# Patient Record
Sex: FEMALE | Race: WHITE | HISPANIC OR LATINO | Employment: OTHER | URBAN - METROPOLITAN AREA
[De-identification: names, ages, dates, MRNs, and addresses within clinical notes are randomized per-mention and may not be internally consistent; named-entity substitution may affect disease eponyms.]

---

## 2024-06-14 ENCOUNTER — PRE-ADMISSION TESTING (OUTPATIENT)
Dept: PREADMISSION TESTING | Facility: HOSPITAL | Age: 69
End: 2024-06-14
Payer: MEDICARE

## 2024-06-14 VITALS — BODY MASS INDEX: 26.06 KG/M2 | HEIGHT: 61 IN | WEIGHT: 138 LBS

## 2024-06-14 LAB
DEPRECATED RDW RBC AUTO: 45.1 FL (ref 37–54)
ERYTHROCYTE [DISTWIDTH] IN BLOOD BY AUTOMATED COUNT: 13.4 % (ref 12.3–15.4)
HCT VFR BLD AUTO: 45.1 % (ref 34–46.6)
HGB BLD-MCNC: 15 G/DL (ref 12–15.9)
MCH RBC QN AUTO: 30.2 PG (ref 26.6–33)
MCHC RBC AUTO-ENTMCNC: 33.3 G/DL (ref 31.5–35.7)
MCV RBC AUTO: 90.9 FL (ref 79–97)
PLATELET # BLD AUTO: 266 10*3/MM3 (ref 140–450)
PMV BLD AUTO: 10.9 FL (ref 6–12)
POTASSIUM SERPL-SCNC: 3.7 MMOL/L (ref 3.5–5.2)
RBC # BLD AUTO: 4.96 10*6/MM3 (ref 3.77–5.28)
WBC NRBC COR # BLD AUTO: 9.04 10*3/MM3 (ref 3.4–10.8)

## 2024-06-14 PROCEDURE — 93005 ELECTROCARDIOGRAM TRACING: CPT

## 2024-06-14 PROCEDURE — 85027 COMPLETE CBC AUTOMATED: CPT

## 2024-06-14 PROCEDURE — 36415 COLL VENOUS BLD VENIPUNCTURE: CPT

## 2024-06-14 PROCEDURE — 84132 ASSAY OF SERUM POTASSIUM: CPT

## 2024-06-14 RX ORDER — MIRABEGRON 50 MG/1
50 TABLET, EXTENDED RELEASE ORAL DAILY
COMMUNITY

## 2024-06-14 RX ORDER — ALPRAZOLAM 0.5 MG/1
TABLET ORAL
COMMUNITY
Start: 2024-06-06

## 2024-06-14 RX ORDER — VIBEGRON 75 MG/1
75 TABLET, FILM COATED ORAL DAILY
COMMUNITY

## 2024-06-14 RX ORDER — CEPHALEXIN 500 MG/1
CAPSULE ORAL
COMMUNITY
Start: 2024-05-29

## 2024-06-14 RX ORDER — GABAPENTIN 600 MG/1
600 TABLET ORAL 3 TIMES DAILY
COMMUNITY

## 2024-06-14 RX ORDER — ROPINIROLE 1 MG/1
1 TABLET, FILM COATED ORAL 3 TIMES DAILY
COMMUNITY

## 2024-06-14 RX ORDER — OMEPRAZOLE 20 MG/1
20 CAPSULE, DELAYED RELEASE ORAL DAILY
COMMUNITY

## 2024-06-14 RX ORDER — ALBUTEROL SULFATE 90 UG/1
AEROSOL, METERED RESPIRATORY (INHALATION)
COMMUNITY
Start: 2024-05-29

## 2024-06-14 RX ORDER — GLYBURIDE 5 MG/1
5 TABLET ORAL
COMMUNITY

## 2024-06-14 RX ORDER — PROMETHAZINE HYDROCHLORIDE 25 MG/1
25 TABLET ORAL EVERY 6 HOURS PRN
COMMUNITY

## 2024-06-14 RX ORDER — ERYTHROMYCIN 5 MG/G
OINTMENT OPHTHALMIC 4 TIMES DAILY
COMMUNITY

## 2024-06-14 RX ORDER — CLOTRIMAZOLE AND BETAMETHASONE DIPROPIONATE 10; .64 MG/G; MG/G
CREAM TOPICAL
COMMUNITY

## 2024-06-14 RX ORDER — BACLOFEN 10 MG/1
10 TABLET ORAL 3 TIMES DAILY
COMMUNITY

## 2024-06-14 RX ORDER — METHADONE HYDROCHLORIDE 10 MG/1
10 TABLET ORAL EVERY 8 HOURS PRN
COMMUNITY
End: 2024-06-21 | Stop reason: HOSPADM

## 2024-06-14 RX ORDER — AMLODIPINE BESYLATE 5 MG/1
5 TABLET ORAL DAILY
COMMUNITY

## 2024-06-14 RX ORDER — BUSPIRONE HYDROCHLORIDE 10 MG/1
3 TABLET ORAL 2 TIMES DAILY
COMMUNITY
Start: 2024-05-23

## 2024-06-14 RX ORDER — DIPHENOXYLATE HYDROCHLORIDE AND ATROPINE SULFATE 2.5; .025 MG/1; MG/1
2 TABLET ORAL 4 TIMES DAILY
COMMUNITY

## 2024-06-14 RX ORDER — OXYCODONE HCL 10 MG/1
10 TABLET, FILM COATED, EXTENDED RELEASE ORAL AS NEEDED
COMMUNITY
End: 2024-06-21 | Stop reason: HOSPADM

## 2024-06-14 RX ORDER — LOSARTAN POTASSIUM 100 MG/1
100 TABLET ORAL DAILY
COMMUNITY

## 2024-06-14 RX ORDER — ATENOLOL 25 MG/1
25 TABLET ORAL DAILY
COMMUNITY

## 2024-06-14 RX ORDER — ALLOPURINOL 100 MG/1
100 TABLET ORAL 2 TIMES DAILY
COMMUNITY

## 2024-06-14 NOTE — PAT
Pt >30 minutes late to PAT appointment. Abbreviated visit completed. Please review medical history and medication list in pre-op.     An arrival time for procedure was not provided during PAT visit. If patient had any questions or concerns about their arrival time, they were instructed to contact their surgeon/physician.  Additionally, if the patient referred to an arrival time that was acquired from their my chart account, patient was encouraged to verify that time with their surgeon/physician. Arrival times are NOT provided in Pre Admission Testing Department.    Patient to apply Chlorhexadine wipes  to surgical area (as instructed) the night before procedure and the AM of procedure. Wipes provided.    Per Anesthesia Request, patient instructed not to take their ACE/ARB medications on the AM of surgery.    Post-Surgery Information Instruction Sheet given to patient during Pre-Admission Testing Visit with verbal instructions to patient to return with PAT PASS on the day of surgery. Additionally, encouraged patient to review the information provided.    EKG from PAT today faxed to anesthesiology department for review and cardiac clearance. RN spoke with Dr. Lauren and reviewed pertinent medical history and EKG results.  Per Dr. Lauren, patient is cleared to proceed with procedure as planned without additional cardiac testing. Patient denies chest pain or increased shortness of breath.

## 2024-06-17 LAB
QT INTERVAL: 374 MS
QTC INTERVAL: 436 MS

## 2024-06-20 ENCOUNTER — ANESTHESIA EVENT (OUTPATIENT)
Dept: PERIOP | Facility: HOSPITAL | Age: 69
End: 2024-06-20
Payer: MEDICARE

## 2024-06-20 RX ORDER — FAMOTIDINE 10 MG/ML
20 INJECTION, SOLUTION INTRAVENOUS ONCE
Status: CANCELLED | OUTPATIENT
Start: 2024-06-20 | End: 2024-06-20

## 2024-06-21 ENCOUNTER — APPOINTMENT (OUTPATIENT)
Dept: GENERAL RADIOLOGY | Facility: HOSPITAL | Age: 69
End: 2024-06-21
Payer: MEDICARE

## 2024-06-21 ENCOUNTER — ANESTHESIA (OUTPATIENT)
Dept: PERIOP | Facility: HOSPITAL | Age: 69
End: 2024-06-21
Payer: MEDICARE

## 2024-06-21 ENCOUNTER — HOSPITAL ENCOUNTER (OUTPATIENT)
Facility: HOSPITAL | Age: 69
Setting detail: HOSPITAL OUTPATIENT SURGERY
Discharge: HOME | End: 2024-06-21
Attending: PAIN MEDICINE | Admitting: PAIN MEDICINE
Payer: MEDICARE

## 2024-06-21 VITALS
TEMPERATURE: 97.6 F | BODY MASS INDEX: 26.06 KG/M2 | OXYGEN SATURATION: 92 % | SYSTOLIC BLOOD PRESSURE: 113 MMHG | HEART RATE: 84 BPM | HEIGHT: 61 IN | RESPIRATION RATE: 12 BRPM | DIASTOLIC BLOOD PRESSURE: 60 MMHG | WEIGHT: 138 LBS

## 2024-06-21 LAB — GLUCOSE BLDC GLUCOMTR-MCNC: 92 MG/DL (ref 70–130)

## 2024-06-21 PROCEDURE — 25010000002 PROPOFOL 10 MG/ML EMULSION: Performed by: NURSE ANESTHETIST, CERTIFIED REGISTERED

## 2024-06-21 PROCEDURE — 25010000002 MIDAZOLAM PER 1 MG: Performed by: STUDENT IN AN ORGANIZED HEALTH CARE EDUCATION/TRAINING PROGRAM

## 2024-06-21 PROCEDURE — 25010000002 CEFAZOLIN PER 500 MG: Performed by: PAIN MEDICINE

## 2024-06-21 PROCEDURE — 25010000002 DEXAMETHASONE PER 1 MG: Performed by: NURSE ANESTHETIST, CERTIFIED REGISTERED

## 2024-06-21 PROCEDURE — 82948 REAGENT STRIP/BLOOD GLUCOSE: CPT

## 2024-06-21 PROCEDURE — 25810000003 SODIUM CHLORIDE PER 500 ML: Performed by: PAIN MEDICINE

## 2024-06-21 PROCEDURE — 25010000002 FENTANYL CITRATE (PF) 100 MCG/2ML SOLUTION: Performed by: NURSE ANESTHETIST, CERTIFIED REGISTERED

## 2024-06-21 PROCEDURE — 25010000002 LIDOCAINE 1 % SOLUTION: Performed by: PAIN MEDICINE

## 2024-06-21 PROCEDURE — C1755 CATHETER, INTRASPINAL: HCPCS | Performed by: PAIN MEDICINE

## 2024-06-21 PROCEDURE — 25010000002 SUGAMMADEX 200 MG/2ML SOLUTION

## 2024-06-21 PROCEDURE — C1772 INFUSION PUMP, PROGRAMMABLE: HCPCS | Performed by: PAIN MEDICINE

## 2024-06-21 PROCEDURE — 76000 FLUOROSCOPY <1 HR PHYS/QHP: CPT

## 2024-06-21 PROCEDURE — 25810000003 LACTATED RINGERS PER 1000 ML: Performed by: NURSE ANESTHETIST, CERTIFIED REGISTERED

## 2024-06-21 PROCEDURE — C1787 PATIENT PROGR, NEUROSTIM: HCPCS | Performed by: PAIN MEDICINE

## 2024-06-21 PROCEDURE — 25010000002 ONDANSETRON PER 1 MG

## 2024-06-21 DEVICE — CATH INTRATHCL ASCENDA SHRT 1PC16G114.3: Type: IMPLANTABLE DEVICE | Site: BACK | Status: FUNCTIONAL

## 2024-06-21 DEVICE — PUMP INFUS INTRATHCL SYNCHROMED3 PROG 20ML: Type: IMPLANTABLE DEVICE | Site: BACK | Status: FUNCTIONAL

## 2024-06-21 RX ORDER — DEXAMETHASONE SODIUM PHOSPHATE 4 MG/ML
INJECTION, SOLUTION INTRA-ARTICULAR; INTRALESIONAL; INTRAMUSCULAR; INTRAVENOUS; SOFT TISSUE AS NEEDED
Status: DISCONTINUED | OUTPATIENT
Start: 2024-06-21 | End: 2024-06-21 | Stop reason: SURG

## 2024-06-21 RX ORDER — MIDAZOLAM HYDROCHLORIDE 1 MG/ML
2 INJECTION INTRAMUSCULAR; INTRAVENOUS ONCE
Status: COMPLETED | OUTPATIENT
Start: 2024-06-21 | End: 2024-06-21

## 2024-06-21 RX ORDER — DROPERIDOL 2.5 MG/ML
0.62 INJECTION, SOLUTION INTRAMUSCULAR; INTRAVENOUS ONCE AS NEEDED
Status: DISCONTINUED | OUTPATIENT
Start: 2024-06-21 | End: 2024-06-21 | Stop reason: HOSPADM

## 2024-06-21 RX ORDER — SODIUM CHLORIDE 9 MG/ML
INJECTION, SOLUTION INTRAVENOUS AS NEEDED
Status: DISCONTINUED | OUTPATIENT
Start: 2024-06-21 | End: 2024-06-21 | Stop reason: HOSPADM

## 2024-06-21 RX ORDER — LIDOCAINE HYDROCHLORIDE 10 MG/ML
INJECTION, SOLUTION INFILTRATION; PERINEURAL AS NEEDED
Status: DISCONTINUED | OUTPATIENT
Start: 2024-06-21 | End: 2024-06-21 | Stop reason: HOSPADM

## 2024-06-21 RX ORDER — SODIUM CHLORIDE, SODIUM LACTATE, POTASSIUM CHLORIDE, CALCIUM CHLORIDE 600; 310; 30; 20 MG/100ML; MG/100ML; MG/100ML; MG/100ML
9 INJECTION, SOLUTION INTRAVENOUS CONTINUOUS
Status: DISCONTINUED | OUTPATIENT
Start: 2024-06-21 | End: 2024-06-21 | Stop reason: HOSPADM

## 2024-06-21 RX ORDER — ROCURONIUM BROMIDE 10 MG/ML
INJECTION, SOLUTION INTRAVENOUS AS NEEDED
Status: DISCONTINUED | OUTPATIENT
Start: 2024-06-21 | End: 2024-06-21 | Stop reason: SURG

## 2024-06-21 RX ORDER — SODIUM CHLORIDE 0.9 % (FLUSH) 0.9 %
10 SYRINGE (ML) INJECTION AS NEEDED
Status: DISCONTINUED | OUTPATIENT
Start: 2024-06-21 | End: 2024-06-21 | Stop reason: HOSPADM

## 2024-06-21 RX ORDER — MIDAZOLAM HYDROCHLORIDE 1 MG/ML
0.5 INJECTION INTRAMUSCULAR; INTRAVENOUS
Status: DISCONTINUED | OUTPATIENT
Start: 2024-06-21 | End: 2024-06-21 | Stop reason: HOSPADM

## 2024-06-21 RX ORDER — FENTANYL CITRATE 50 UG/ML
50 INJECTION, SOLUTION INTRAMUSCULAR; INTRAVENOUS
Status: DISCONTINUED | OUTPATIENT
Start: 2024-06-21 | End: 2024-06-21 | Stop reason: HOSPADM

## 2024-06-21 RX ORDER — PROPOFOL 10 MG/ML
VIAL (ML) INTRAVENOUS AS NEEDED
Status: DISCONTINUED | OUTPATIENT
Start: 2024-06-21 | End: 2024-06-21 | Stop reason: SURG

## 2024-06-21 RX ORDER — BUPIVACAINE HYDROCHLORIDE AND EPINEPHRINE 5; 5 MG/ML; UG/ML
INJECTION, SOLUTION EPIDURAL; INTRACAUDAL; PERINEURAL AS NEEDED
Status: DISCONTINUED | OUTPATIENT
Start: 2024-06-21 | End: 2024-06-21 | Stop reason: HOSPADM

## 2024-06-21 RX ORDER — LIDOCAINE HYDROCHLORIDE 10 MG/ML
0.5 INJECTION, SOLUTION EPIDURAL; INFILTRATION; INTRACAUDAL; PERINEURAL ONCE AS NEEDED
Status: DISCONTINUED | OUTPATIENT
Start: 2024-06-21 | End: 2024-06-21 | Stop reason: HOSPADM

## 2024-06-21 RX ORDER — SODIUM CHLORIDE 0.9 % (FLUSH) 0.9 %
10 SYRINGE (ML) INJECTION EVERY 12 HOURS SCHEDULED
Status: DISCONTINUED | OUTPATIENT
Start: 2024-06-21 | End: 2024-06-21 | Stop reason: HOSPADM

## 2024-06-21 RX ORDER — LIDOCAINE HYDROCHLORIDE 10 MG/ML
INJECTION, SOLUTION EPIDURAL; INFILTRATION; INTRACAUDAL; PERINEURAL AS NEEDED
Status: DISCONTINUED | OUTPATIENT
Start: 2024-06-21 | End: 2024-06-21 | Stop reason: SURG

## 2024-06-21 RX ORDER — SODIUM CHLORIDE, SODIUM LACTATE, POTASSIUM CHLORIDE, CALCIUM CHLORIDE 600; 310; 30; 20 MG/100ML; MG/100ML; MG/100ML; MG/100ML
INJECTION, SOLUTION INTRAVENOUS CONTINUOUS PRN
Status: DISCONTINUED | OUTPATIENT
Start: 2024-06-21 | End: 2024-06-21 | Stop reason: SURG

## 2024-06-21 RX ORDER — SODIUM CHLORIDE 9 MG/ML
40 INJECTION, SOLUTION INTRAVENOUS AS NEEDED
Status: DISCONTINUED | OUTPATIENT
Start: 2024-06-21 | End: 2024-06-21 | Stop reason: HOSPADM

## 2024-06-21 RX ORDER — ONDANSETRON 2 MG/ML
INJECTION INTRAMUSCULAR; INTRAVENOUS AS NEEDED
Status: DISCONTINUED | OUTPATIENT
Start: 2024-06-21 | End: 2024-06-21 | Stop reason: SURG

## 2024-06-21 RX ORDER — FAMOTIDINE 20 MG/1
20 TABLET, FILM COATED ORAL ONCE
Status: DISCONTINUED | OUTPATIENT
Start: 2024-06-21 | End: 2024-06-21 | Stop reason: HOSPADM

## 2024-06-21 RX ORDER — EPHEDRINE SULFATE 50 MG/ML
INJECTION INTRAVENOUS AS NEEDED
Status: DISCONTINUED | OUTPATIENT
Start: 2024-06-21 | End: 2024-06-21 | Stop reason: SURG

## 2024-06-21 RX ORDER — FENTANYL CITRATE 50 UG/ML
INJECTION, SOLUTION INTRAMUSCULAR; INTRAVENOUS AS NEEDED
Status: DISCONTINUED | OUTPATIENT
Start: 2024-06-21 | End: 2024-06-21 | Stop reason: SURG

## 2024-06-21 RX ADMIN — SODIUM CHLORIDE 2000 MG: 900 INJECTION INTRAVENOUS at 13:44

## 2024-06-21 RX ADMIN — SODIUM CHLORIDE, POTASSIUM CHLORIDE, SODIUM LACTATE AND CALCIUM CHLORIDE: 600; 310; 30; 20 INJECTION, SOLUTION INTRAVENOUS at 13:36

## 2024-06-21 RX ADMIN — PROPOFOL 20 MG: 10 INJECTION, EMULSION INTRAVENOUS at 14:55

## 2024-06-21 RX ADMIN — ONDANSETRON 4 MG: 2 INJECTION INTRAMUSCULAR; INTRAVENOUS at 14:51

## 2024-06-21 RX ADMIN — DEXAMETHASONE SODIUM PHOSPHATE 4 MG: 4 INJECTION INTRA-ARTICULAR; INTRALESIONAL; INTRAMUSCULAR; INTRAVENOUS; SOFT TISSUE at 13:40

## 2024-06-21 RX ADMIN — EPHEDRINE SULFATE 10 MG: 50 INJECTION INTRAVENOUS at 14:21

## 2024-06-21 RX ADMIN — FENTANYL CITRATE 100 MCG: 50 INJECTION, SOLUTION INTRAMUSCULAR; INTRAVENOUS at 13:36

## 2024-06-21 RX ADMIN — EPHEDRINE SULFATE 10 MG: 50 INJECTION INTRAVENOUS at 14:40

## 2024-06-21 RX ADMIN — SUGAMMADEX 200 MG: 100 INJECTION, SOLUTION INTRAVENOUS at 15:15

## 2024-06-21 RX ADMIN — PROPOFOL 20 MG: 10 INJECTION, EMULSION INTRAVENOUS at 14:59

## 2024-06-21 RX ADMIN — ROCURONIUM BROMIDE 30 MG: 10 INJECTION INTRAVENOUS at 13:36

## 2024-06-21 RX ADMIN — PROPOFOL 30 MG: 10 INJECTION, EMULSION INTRAVENOUS at 14:51

## 2024-06-21 RX ADMIN — EPHEDRINE SULFATE 10 MG: 50 INJECTION INTRAVENOUS at 13:59

## 2024-06-21 RX ADMIN — PROPOFOL 200 MG: 10 INJECTION, EMULSION INTRAVENOUS at 13:36

## 2024-06-21 RX ADMIN — LIDOCAINE HYDROCHLORIDE 50 MG: 10 INJECTION, SOLUTION EPIDURAL; INFILTRATION; INTRACAUDAL; PERINEURAL at 13:36

## 2024-06-21 RX ADMIN — SODIUM CHLORIDE, POTASSIUM CHLORIDE, SODIUM LACTATE AND CALCIUM CHLORIDE: 600; 310; 30; 20 INJECTION, SOLUTION INTRAVENOUS at 15:14

## 2024-06-21 RX ADMIN — ROCURONIUM BROMIDE 20 MG: 10 INJECTION INTRAVENOUS at 14:00

## 2024-06-21 RX ADMIN — MIDAZOLAM HYDROCHLORIDE 2 MG: 1 INJECTION, SOLUTION INTRAMUSCULAR; INTRAVENOUS at 12:39

## 2024-06-21 NOTE — ANESTHESIA PROCEDURE NOTES
Airway  Urgency: elective    Date/Time: 6/21/2024 1:38 PM  Airway not difficult    General Information and Staff    Patient location during procedure: OR  CRNA/CAA: Konrad Zimmeramn CRNA    Indications and Patient Condition  Indications for airway management: airway protection    Preoxygenated: yes  MILS not maintained throughout  Mask difficulty assessment: 1 - vent by mask    Final Airway Details  Final airway type: endotracheal airway      Successful airway: ETT  Cuffed: yes   Successful intubation technique: direct laryngoscopy  Endotracheal tube insertion site: oral  Blade: Sunitha  Blade size: 3  ETT size (mm): 7.5  Cormack-Lehane Classification: grade I - full view of glottis  Placement verified by: chest auscultation and capnometry   Measured from: lips  ETT/EBT  to lips (cm): 20  Number of attempts at approach: 1  Assessment: lips, teeth, and gum same as pre-op and atraumatic intubation    Additional Comments  Negative epigastric sounds, Breath sound equal bilaterally with symmetric chest rise and fall

## 2024-06-21 NOTE — INTERVAL H&P NOTE
Pre-Op H&P (See Recent Office Note Attached for Full H&P)    History and physical note from office reviewed and updated with the following, otherwise there are no changes in H&P:      Review of Systems:  General ROS:  no fever, chills, rashes.  No change since last office visit.  No recent sick exposure  Cardiovascular ROS: no chest pain or dyspnea on exertion  Respiratory ROS: no cough, shortness of breath, or wheezing      Meds:    No current facility-administered medications on file prior to encounter.     No current outpatient medications on file prior to encounter.       Vital Signs:  There were no vitals taken for this visit.    Physical Exam:    CV:  S1S2 regular rate and rhythm, no murmur               Resp:  Clear to auscultation; respirations regular, even and unlabored    Results Review:     Lab Results   Component Value Date    WBC 9.04 06/14/2024    HGB 15.0 06/14/2024    HCT 45.1 06/14/2024    MCV 90.9 06/14/2024     06/14/2024    K 3.7 06/14/2024    MG 1.9 02/26/2024   I reviewed the patient's new clinical results.    Cancer Staging (if applicable)  Cancer Patient: __ yes __no __unknown; If yes, clinical stage T:__ N:__M:__, stage group or __N/A    Assessment/Plan:  CHRONIC PAIN SYNDROME; PRIMARY OSTEOARTHRITIS; RHEUMATOID ARTHRITIS /   INTRATHECAL CATHETER AND PAIN PUMP IMPLANT       KATIE Monroy   6/21/2024   11:52 EDT

## 2024-06-21 NOTE — ANESTHESIA POSTPROCEDURE EVALUATION
Patient: Phoebe Call    Procedure Summary       Date: 06/21/24 Room / [facility]:  [facility]    Anesthesia Start: 1334 Anesthesia Stop: 1527    Procedure: INTRATHECAL CATHETER AND PAIN PUMP IMPLANT Diagnosis:     Surgeons: Cristi Egan MD Provider: Santhosh Freitas MD    Anesthesia Type: general ASA Status: 3            Anesthesia Type: general    Vitals  Vitals Value Taken Time   BP 95/49 06/21/24 1527   Temp 96.8 °F (36 °C) 06/21/24 1527   Pulse 77 06/21/24 1527   Resp 16 06/21/24 1527   SpO2 92 % 06/21/24 1527           Post Anesthesia Care and Evaluation    Patient location during evaluation: PACU  Patient participation: waiting for patient participation  Level of consciousness: responsive to verbal stimuli    Airway patency: patent  Anesthetic complications: No anesthetic complications  PONV Status: none  Cardiovascular status: blood pressure returned to baseline and acceptable  Respiratory status: nasal cannula, oral airway and spontaneous ventilation  Hydration status: acceptable  No anesthesia care post op

## 2024-06-21 NOTE — ANESTHESIA PREPROCEDURE EVALUATION
Anesthesia Evaluation     Patient summary reviewed and Nursing notes reviewed   no history of anesthetic complications:   NPO Solid Status: > 8 hours  NPO Liquid Status: > 8 hours           Airway   Mallampati: II  TM distance: >3 FB  Neck ROM: full  Dental    (+) edentulous    Pulmonary - normal exam    breath sounds clear to auscultation  Cardiovascular - normal exam    ECG reviewed  Patient on routine beta blocker and Beta blocker given within 24 hours of surgery  Rhythm: regular  Rate: normal    (+) hypertension    ROS comment: EKG 6/14/24  HR 82  Normal sinus rhythm  Left anterior fascicular block  Minimal voltage criteria for LVH, may be normal variant ( Montrell product )  Inferior infarct , age undetermined  Abnormal ECG  No previous ECGs available      Neuro/Psych  (+) psychiatric history Anxiety and Depression    ROS Comment: RLS  GI/Hepatic/Renal/Endo    (+) GERD, diabetes mellitus    Musculoskeletal     (+) back pain (s/p lumbar fusion), chronic pain (on methadone, gabapentin, baclofen)  Abdominal    Substance History      OB/GYN          Other   arthritis,                   Anesthesia Plan    ASA 3     general     intravenous induction     Anesthetic plan, risks, benefits, and alternatives have been provided, discussed and informed consent has been obtained with: patient.    Plan discussed with CRNA.    CODE STATUS:

## 2024-06-21 NOTE — OP NOTE
After obtaining informed consent the patient was taken to the operating room where general anesthesia was induced.  She was transferred to the operating room table in the right lateral decubitus position.  All pressure points were padded and distal pulses were intact.  The area of the left abdominal wall flank and thoracolumbar region was prepped with a DuraPrep solution and draped in sterile fashion.  An Ioban was placed over the expected surgical sites.  The ill 3 vertebral body was identified fluoroscopically.  A 3 cm incision was then made at the surface projection on the skin.  I infiltrated approximately 10 mL of 0.5% bupivacaine with epinephrine and 1% lidocaine along the incision line.  Tissues were then dissected down to the level of the lumbar fascia.  Hemostasis was obtained with Bovie cautery.  A pocket was created by blunt dissection.  A 14-gauge Touhy needle was then advanced with the aid of fluoroscopy into the intrathecal space.  There was return of clear CSF.  A Doostangtronic intrathecal catheter was then advanced through this needle to a depth of approximately 10 cm within the intrathecal space.  The distal tip of the catheter was noted to be at about the T8 vertebral body level.  The needle was then withdrawn taking care not to dislodge the catheter.  The stylette was removed from the catheter.  The catheter was then anchored to the lumbar fascia using a butterfly wing type anchor.  This was done with interrupted stitches and 2-0 silk.  Next attention was turned to the abdominal wall.  An additional 10 mL of local anesthetic solution were infiltrated along a 7 cm incision line.  Tissues were again dissected down to the fascial plane.  Hemostasis was obtained with Bovie cautery.  A pocket was then created by blunt dissection.  A tunneling trocar was then advanced from the abdominal pocket through the subcutaneous tissues exiting through the midline thoracolumbar incision.  The intrathecal catheter was  then passed through the tunneling trocar and exited through the abdominal site.  This was then attached by sutureless connector to the pain pump.  Free flow of CSF was noted before connecting the catheter.  The abdominal pocket was then irrigated with copious amounts of Irrisept.  The pump was placed in the pocket and closed first using a 2-0 Vicryl interrupted stitch followed by 3-0 Vicryl subcutaneous stitch.  Exofin was applied to the surgical wound along with Steri-Strips and sterile Tegaderm dressing.  The midline lumbar incision was then closed in similar fashion again irrigating with Irrisept prior to closure.  The 2-0 Vicryl interrupted stitch along with 3-0 subcuticular stitches were used as well.  Steri-Strip and Tegaderm dressings were applied.  Patient was then transferred to the recovery room where she was noted to be neurologically intact.  In the recovery room the pain pump was programmed to deliver 0.25 mg bolus of Dilaudid to be followed by a 0.5 mg/day dosing.  Estimated blood loss was 15 mL  No complications occurred during the case  Patient will be discharged to home.  She will follow-up in 10 days for removal of suture and possible pain pump reprogramming.

## 2025-02-21 ENCOUNTER — TRANSCRIBE ORDERS (OUTPATIENT)
Dept: LAB | Facility: HOSPITAL | Age: 70
End: 2025-02-21
Payer: MEDICARE

## 2025-02-21 ENCOUNTER — LAB (OUTPATIENT)
Dept: LAB | Facility: HOSPITAL | Age: 70
End: 2025-02-21
Payer: MEDICARE

## 2025-02-21 DIAGNOSIS — L03.114 CELLULITIS OF LEFT HAND EXCLUDING FINGERS AND THUMB: Primary | ICD-10-CM

## 2025-02-21 DIAGNOSIS — L03.115 CELLULITIS OF RIGHT FOOT: ICD-10-CM

## 2025-02-21 DIAGNOSIS — L03.116 CELLULITIS OF LEFT FOOT: ICD-10-CM

## 2025-02-21 PROCEDURE — 87070 CULTURE OTHR SPECIMN AEROBIC: CPT

## 2025-02-21 PROCEDURE — 87205 SMEAR GRAM STAIN: CPT

## 2025-02-24 LAB
BACTERIA SPEC AEROBE CULT: NORMAL
GRAM STN SPEC: NORMAL
GRAM STN SPEC: NORMAL

## 2025-04-09 ENCOUNTER — LAB (OUTPATIENT)
Dept: LAB | Facility: HOSPITAL | Age: 70
End: 2025-04-09
Payer: MEDICARE

## 2025-04-09 ENCOUNTER — TRANSCRIBE ORDERS (OUTPATIENT)
Dept: LAB | Facility: HOSPITAL | Age: 70
End: 2025-04-09
Payer: MEDICARE

## 2025-04-09 DIAGNOSIS — N10 ACUTE PYELONEPHRITIS WITHOUT LESION OF RENAL MEDULLARY NECROSIS: ICD-10-CM

## 2025-04-09 DIAGNOSIS — N13.6 ACUTE PYONEPHROSIS WITHOUT RENAL MEDULLARY NECROSIS: ICD-10-CM

## 2025-04-09 DIAGNOSIS — N39.0 URINARY TRACT INFECTION WITHOUT HEMATURIA, SITE UNSPECIFIED: ICD-10-CM

## 2025-04-09 DIAGNOSIS — N39.0 URINARY TRACT INFECTION WITHOUT HEMATURIA, SITE UNSPECIFIED: Primary | ICD-10-CM

## 2025-04-09 LAB
BACTERIA UR QL AUTO: NORMAL /HPF
BILIRUB UR QL STRIP: NEGATIVE
CLARITY UR: CLEAR
COLOR UR: YELLOW
GLUCOSE UR STRIP-MCNC: NEGATIVE MG/DL
HGB UR QL STRIP.AUTO: NEGATIVE
HYALINE CASTS UR QL AUTO: NORMAL /LPF
KETONES UR QL STRIP: NEGATIVE
LEUKOCYTE ESTERASE UR QL STRIP.AUTO: ABNORMAL
NITRITE UR QL STRIP: NEGATIVE
PH UR STRIP.AUTO: 6.5 [PH] (ref 5–8)
PROT UR QL STRIP: NEGATIVE
RBC # UR STRIP: NORMAL /HPF
REF LAB TEST METHOD: NORMAL
SP GR UR STRIP: 1.01 (ref 1–1.03)
SQUAMOUS #/AREA URNS HPF: NORMAL /HPF
UROBILINOGEN UR QL STRIP: ABNORMAL
WBC # UR STRIP: NORMAL /HPF

## 2025-04-09 PROCEDURE — 81001 URINALYSIS AUTO W/SCOPE: CPT

## 2025-04-09 PROCEDURE — 87086 URINE CULTURE/COLONY COUNT: CPT

## 2025-04-10 LAB — BACTERIA SPEC AEROBE CULT: NORMAL

## 2025-04-30 ENCOUNTER — APPOINTMENT (OUTPATIENT)
Dept: GENERAL RADIOLOGY | Facility: HOSPITAL | Age: 70
DRG: 065 | End: 2025-04-30
Payer: MEDICARE

## 2025-04-30 ENCOUNTER — APPOINTMENT (OUTPATIENT)
Dept: CT IMAGING | Facility: HOSPITAL | Age: 70
DRG: 065 | End: 2025-04-30
Payer: MEDICARE

## 2025-04-30 ENCOUNTER — HOSPITAL ENCOUNTER (INPATIENT)
Facility: HOSPITAL | Age: 70
LOS: 9 days | Discharge: NURSING, REHAB OR LONG-TERM CARE | DRG: 065 | End: 2025-05-09
Attending: EMERGENCY MEDICINE | Admitting: INTERNAL MEDICINE
Payer: MEDICARE

## 2025-04-30 DIAGNOSIS — R29.90 STROKE-LIKE SYMPTOMS: ICD-10-CM

## 2025-04-30 DIAGNOSIS — Z87.39 HISTORY OF RHEUMATOID ARTHRITIS: ICD-10-CM

## 2025-04-30 DIAGNOSIS — Z86.39 HISTORY OF DIABETES MELLITUS: ICD-10-CM

## 2025-04-30 DIAGNOSIS — I72.9 ANEURYSM: ICD-10-CM

## 2025-04-30 DIAGNOSIS — R41.841 COGNITIVE COMMUNICATION DEFICIT: ICD-10-CM

## 2025-04-30 DIAGNOSIS — Z86.79 HISTORY OF HYPERTENSION: ICD-10-CM

## 2025-04-30 DIAGNOSIS — R20.0 RIGHT SIDED NUMBNESS: ICD-10-CM

## 2025-04-30 DIAGNOSIS — R53.1 ACUTE RIGHT-SIDED WEAKNESS: Primary | ICD-10-CM

## 2025-04-30 DIAGNOSIS — R47.1 DYSARTHRIA: ICD-10-CM

## 2025-04-30 DIAGNOSIS — Z86.73 HISTORY OF CVA (CEREBROVASCULAR ACCIDENT): ICD-10-CM

## 2025-04-30 DIAGNOSIS — G89.4 CHRONIC PAIN DISORDER: ICD-10-CM

## 2025-04-30 PROBLEM — K21.9 GASTROESOPHAGEAL REFLUX DISEASE WITHOUT ESOPHAGITIS: Status: ACTIVE | Noted: 2023-04-12

## 2025-04-30 PROBLEM — E78.5 HYPERLIPIDEMIA: Status: ACTIVE | Noted: 2023-04-12

## 2025-04-30 PROBLEM — E11.42 DIABETIC PERIPHERAL NEUROPATHY ASSOCIATED WITH TYPE 2 DIABETES MELLITUS: Status: ACTIVE | Noted: 2023-05-17

## 2025-04-30 PROBLEM — I10 BENIGN ESSENTIAL HYPERTENSION: Status: ACTIVE | Noted: 2019-04-10

## 2025-04-30 PROBLEM — I63.9 STROKE: Status: ACTIVE | Noted: 2025-04-30

## 2025-04-30 PROBLEM — I63.9 CVA (CEREBRAL VASCULAR ACCIDENT): Status: ACTIVE | Noted: 2025-04-30

## 2025-04-30 PROBLEM — E03.9 HYPOTHYROIDISM: Status: ACTIVE | Noted: 2021-07-20

## 2025-04-30 LAB
ALBUMIN SERPL-MCNC: 4.1 G/DL (ref 3.5–5.2)
ALBUMIN/GLOB SERPL: 1.3 G/DL
ALP SERPL-CCNC: 167 U/L (ref 39–117)
ALT SERPL W P-5'-P-CCNC: 8 U/L (ref 1–33)
ANION GAP SERPL CALCULATED.3IONS-SCNC: 11 MMOL/L (ref 5–15)
APTT PPP: 37.9 SECONDS (ref 22–39)
AST SERPL-CCNC: 28 U/L (ref 1–32)
BASOPHILS # BLD AUTO: 0.03 10*3/MM3 (ref 0–0.2)
BASOPHILS NFR BLD AUTO: 0.4 % (ref 0–1.5)
BILIRUB SERPL-MCNC: 0.4 MG/DL (ref 0–1.2)
BUN BLDA-MCNC: 12 MG/DL (ref 8–26)
BUN SERPL-MCNC: 9 MG/DL (ref 8–23)
BUN/CREAT SERPL: 25.7 (ref 7–25)
CA-I BLDA-SCNC: 1.13 MMOL/L (ref 1.2–1.32)
CALCIUM SPEC-SCNC: 9.9 MG/DL (ref 8.6–10.5)
CHLORIDE BLDA-SCNC: 99 MMOL/L (ref 98–109)
CHLORIDE SERPL-SCNC: 99 MMOL/L (ref 98–107)
CO2 BLDA-SCNC: 30 MMOL/L (ref 24–29)
CO2 SERPL-SCNC: 28 MMOL/L (ref 22–29)
CREAT BLDA-MCNC: 0.4 MG/DL (ref 0.6–1.3)
CREAT SERPL-MCNC: 0.35 MG/DL (ref 0.57–1)
DEPRECATED RDW RBC AUTO: 48.7 FL (ref 37–54)
EGFRCR SERPLBLD CKD-EPI 2021: 108 ML/MIN/1.73
EGFRCR SERPLBLD CKD-EPI 2021: 111.5 ML/MIN/1.73
EOSINOPHIL # BLD AUTO: 0.3 10*3/MM3 (ref 0–0.4)
EOSINOPHIL NFR BLD AUTO: 3.8 % (ref 0.3–6.2)
ERYTHROCYTE [DISTWIDTH] IN BLOOD BY AUTOMATED COUNT: 15.7 % (ref 12.3–15.4)
GLOBULIN UR ELPH-MCNC: 3.1 GM/DL
GLUCOSE BLDC GLUCOMTR-MCNC: 118 MG/DL (ref 70–130)
GLUCOSE BLDC GLUCOMTR-MCNC: 151 MG/DL (ref 70–130)
GLUCOSE SERPL-MCNC: 127 MG/DL (ref 65–99)
HCT VFR BLD AUTO: 37.7 % (ref 34–46.6)
HCT VFR BLDA CALC: 38 % (ref 38–51)
HGB BLD-MCNC: 12.3 G/DL (ref 12–15.9)
HGB BLDA-MCNC: 12.9 G/DL (ref 12–17)
HOLD SPECIMEN: NORMAL
IMM GRANULOCYTES # BLD AUTO: 0.03 10*3/MM3 (ref 0–0.05)
IMM GRANULOCYTES NFR BLD AUTO: 0.4 % (ref 0–0.5)
INR PPP: 1.05 (ref 0.89–1.12)
LYMPHOCYTES # BLD AUTO: 2.61 10*3/MM3 (ref 0.7–3.1)
LYMPHOCYTES NFR BLD AUTO: 32.8 % (ref 19.6–45.3)
MCH RBC QN AUTO: 27.5 PG (ref 26.6–33)
MCHC RBC AUTO-ENTMCNC: 32.6 G/DL (ref 31.5–35.7)
MCV RBC AUTO: 84.2 FL (ref 79–97)
MONOCYTES # BLD AUTO: 0.58 10*3/MM3 (ref 0.1–0.9)
MONOCYTES NFR BLD AUTO: 7.3 % (ref 5–12)
NEUTROPHILS NFR BLD AUTO: 4.4 10*3/MM3 (ref 1.7–7)
NEUTROPHILS NFR BLD AUTO: 55.3 % (ref 42.7–76)
NRBC BLD AUTO-RTO: 0 /100 WBC (ref 0–0.2)
PLATELET # BLD AUTO: 257 10*3/MM3 (ref 140–450)
PMV BLD AUTO: 10.9 FL (ref 6–12)
POTASSIUM BLDA-SCNC: 5.8 MMOL/L (ref 3.5–4.9)
POTASSIUM SERPL-SCNC: 4 MMOL/L (ref 3.5–5.2)
PROT SERPL-MCNC: 7.2 G/DL (ref 6–8.5)
PROTHROMBIN TIME: 14.3 SECONDS (ref 12.2–15.3)
RBC # BLD AUTO: 4.48 10*6/MM3 (ref 3.77–5.28)
SODIUM BLD-SCNC: 134 MMOL/L (ref 138–146)
SODIUM SERPL-SCNC: 138 MMOL/L (ref 136–145)
WBC NRBC COR # BLD AUTO: 7.95 10*3/MM3 (ref 3.4–10.8)
WHOLE BLOOD HOLD COAG: NORMAL
WHOLE BLOOD HOLD SPECIMEN: NORMAL

## 2025-04-30 PROCEDURE — 70496 CT ANGIOGRAPHY HEAD: CPT

## 2025-04-30 PROCEDURE — 85025 COMPLETE CBC W/AUTO DIFF WBC: CPT | Performed by: EMERGENCY MEDICINE

## 2025-04-30 PROCEDURE — 99223 1ST HOSP IP/OBS HIGH 75: CPT | Performed by: STUDENT IN AN ORGANIZED HEALTH CARE EDUCATION/TRAINING PROGRAM

## 2025-04-30 PROCEDURE — 99223 1ST HOSP IP/OBS HIGH 75: CPT

## 2025-04-30 PROCEDURE — 63710000001 INSULIN LISPRO (HUMAN) PER 5 UNITS: Performed by: STUDENT IN AN ORGANIZED HEALTH CARE EDUCATION/TRAINING PROGRAM

## 2025-04-30 PROCEDURE — 25010000002 HYDROMORPHONE 1 MG/ML SOLUTION: Performed by: EMERGENCY MEDICINE

## 2025-04-30 PROCEDURE — 36415 COLL VENOUS BLD VENIPUNCTURE: CPT

## 2025-04-30 PROCEDURE — 71045 X-RAY EXAM CHEST 1 VIEW: CPT

## 2025-04-30 PROCEDURE — 93005 ELECTROCARDIOGRAM TRACING: CPT | Performed by: EMERGENCY MEDICINE

## 2025-04-30 PROCEDURE — 25010000002 MIDAZOLAM PER 1 MG: Performed by: EMERGENCY MEDICINE

## 2025-04-30 PROCEDURE — 25010000002 HYDROMORPHONE 1 MG/ML SOLUTION: Performed by: STUDENT IN AN ORGANIZED HEALTH CARE EDUCATION/TRAINING PROGRAM

## 2025-04-30 PROCEDURE — 70450 CT HEAD/BRAIN W/O DYE: CPT

## 2025-04-30 PROCEDURE — 85014 HEMATOCRIT: CPT | Performed by: EMERGENCY MEDICINE

## 2025-04-30 PROCEDURE — 85730 THROMBOPLASTIN TIME PARTIAL: CPT | Performed by: EMERGENCY MEDICINE

## 2025-04-30 PROCEDURE — 99285 EMERGENCY DEPT VISIT HI MDM: CPT

## 2025-04-30 PROCEDURE — 82948 REAGENT STRIP/BLOOD GLUCOSE: CPT

## 2025-04-30 PROCEDURE — 80047 BASIC METABLC PNL IONIZED CA: CPT | Performed by: EMERGENCY MEDICINE

## 2025-04-30 PROCEDURE — 80053 COMPREHEN METABOLIC PANEL: CPT

## 2025-04-30 PROCEDURE — 25510000001 IOPAMIDOL PER 1 ML: Performed by: EMERGENCY MEDICINE

## 2025-04-30 PROCEDURE — 85610 PROTHROMBIN TIME: CPT | Performed by: EMERGENCY MEDICINE

## 2025-04-30 PROCEDURE — 70498 CT ANGIOGRAPHY NECK: CPT

## 2025-04-30 RX ORDER — NITROGLYCERIN 0.4 MG/1
0.4 TABLET SUBLINGUAL
Status: DISCONTINUED | OUTPATIENT
Start: 2025-04-30 | End: 2025-05-09 | Stop reason: HOSPADM

## 2025-04-30 RX ORDER — SODIUM CHLORIDE 9 MG/ML
40 INJECTION, SOLUTION INTRAVENOUS AS NEEDED
Status: DISCONTINUED | OUTPATIENT
Start: 2025-04-30 | End: 2025-05-09 | Stop reason: HOSPADM

## 2025-04-30 RX ORDER — HYDROMORPHONE HYDROCHLORIDE 1 MG/ML
0.5 INJECTION, SOLUTION INTRAMUSCULAR; INTRAVENOUS; SUBCUTANEOUS
Status: DISCONTINUED | OUTPATIENT
Start: 2025-04-30 | End: 2025-05-01

## 2025-04-30 RX ORDER — ALPRAZOLAM 0.5 MG
0.5 TABLET ORAL 3 TIMES DAILY
Status: DISCONTINUED | OUTPATIENT
Start: 2025-05-01 | End: 2025-04-30

## 2025-04-30 RX ORDER — ONDANSETRON 2 MG/ML
4 INJECTION INTRAMUSCULAR; INTRAVENOUS EVERY 6 HOURS PRN
Status: DISCONTINUED | OUTPATIENT
Start: 2025-04-30 | End: 2025-05-09 | Stop reason: HOSPADM

## 2025-04-30 RX ORDER — BACLOFEN 10 MG/1
10 TABLET ORAL 3 TIMES DAILY
Status: DISCONTINUED | OUTPATIENT
Start: 2025-05-01 | End: 2025-05-09 | Stop reason: HOSPADM

## 2025-04-30 RX ORDER — SODIUM CHLORIDE 0.9 % (FLUSH) 0.9 %
10 SYRINGE (ML) INJECTION EVERY 12 HOURS SCHEDULED
Status: DISCONTINUED | OUTPATIENT
Start: 2025-04-30 | End: 2025-05-09 | Stop reason: HOSPADM

## 2025-04-30 RX ORDER — BISACODYL 5 MG/1
5 TABLET, DELAYED RELEASE ORAL DAILY PRN
Status: DISCONTINUED | OUTPATIENT
Start: 2025-04-30 | End: 2025-05-09 | Stop reason: HOSPADM

## 2025-04-30 RX ORDER — AMOXICILLIN 250 MG
2 CAPSULE ORAL 2 TIMES DAILY PRN
Status: DISCONTINUED | OUTPATIENT
Start: 2025-04-30 | End: 2025-05-09 | Stop reason: HOSPADM

## 2025-04-30 RX ORDER — CLOTRIMAZOLE AND BETAMETHASONE DIPROPIONATE 10; .64 MG/G; MG/G
CREAM TOPICAL EVERY 12 HOURS SCHEDULED
Status: DISCONTINUED | OUTPATIENT
Start: 2025-05-01 | End: 2025-05-09 | Stop reason: HOSPADM

## 2025-04-30 RX ORDER — KETAMINE HCL IN NACL, ISO-OSM 100MG/10ML
SYRINGE (ML) INJECTION
Status: DISCONTINUED
Start: 2025-04-30 | End: 2025-04-30 | Stop reason: WASHOUT

## 2025-04-30 RX ORDER — SODIUM CHLORIDE 0.9 % (FLUSH) 0.9 %
10 SYRINGE (ML) INJECTION AS NEEDED
Status: DISCONTINUED | OUTPATIENT
Start: 2025-04-30 | End: 2025-05-09 | Stop reason: HOSPADM

## 2025-04-30 RX ORDER — ALLOPURINOL 100 MG/1
100 TABLET ORAL 2 TIMES DAILY
Status: DISCONTINUED | OUTPATIENT
Start: 2025-05-01 | End: 2025-05-09 | Stop reason: HOSPADM

## 2025-04-30 RX ORDER — MIDAZOLAM HYDROCHLORIDE 1 MG/ML
1 INJECTION, SOLUTION INTRAMUSCULAR; INTRAVENOUS ONCE
Status: COMPLETED | OUTPATIENT
Start: 2025-04-30 | End: 2025-04-30

## 2025-04-30 RX ORDER — ALUMINA, MAGNESIA, AND SIMETHICONE 2400; 2400; 240 MG/30ML; MG/30ML; MG/30ML
15 SUSPENSION ORAL EVERY 6 HOURS PRN
Status: DISCONTINUED | OUTPATIENT
Start: 2025-04-30 | End: 2025-05-09 | Stop reason: HOSPADM

## 2025-04-30 RX ORDER — GABAPENTIN 300 MG/1
600 CAPSULE ORAL EVERY 8 HOURS SCHEDULED
Status: DISCONTINUED | OUTPATIENT
Start: 2025-05-01 | End: 2025-05-09 | Stop reason: HOSPADM

## 2025-04-30 RX ORDER — ALPRAZOLAM 0.5 MG
0.5 TABLET ORAL 3 TIMES DAILY
Status: DISCONTINUED | OUTPATIENT
Start: 2025-05-01 | End: 2025-05-01

## 2025-04-30 RX ORDER — PANTOPRAZOLE SODIUM 40 MG/1
40 TABLET, DELAYED RELEASE ORAL
Status: DISCONTINUED | OUTPATIENT
Start: 2025-05-01 | End: 2025-05-02

## 2025-04-30 RX ORDER — ACETAMINOPHEN 325 MG/1
650 TABLET ORAL EVERY 4 HOURS PRN
Status: DISCONTINUED | OUTPATIENT
Start: 2025-04-30 | End: 2025-05-09 | Stop reason: HOSPADM

## 2025-04-30 RX ORDER — KETAMINE HCL IN NACL, ISO-OSM 100MG/10ML
SYRINGE (ML) INJECTION
Status: COMPLETED
Start: 2025-04-30 | End: 2025-04-30

## 2025-04-30 RX ORDER — NICOTINE POLACRILEX 4 MG
15 LOZENGE BUCCAL
Status: DISCONTINUED | OUTPATIENT
Start: 2025-04-30 | End: 2025-05-09 | Stop reason: HOSPADM

## 2025-04-30 RX ORDER — INSULIN LISPRO 100 [IU]/ML
2-9 INJECTION, SOLUTION INTRAVENOUS; SUBCUTANEOUS
Status: DISCONTINUED | OUTPATIENT
Start: 2025-04-30 | End: 2025-05-09 | Stop reason: HOSPADM

## 2025-04-30 RX ORDER — BISACODYL 10 MG
10 SUPPOSITORY, RECTAL RECTAL DAILY PRN
Status: DISCONTINUED | OUTPATIENT
Start: 2025-04-30 | End: 2025-05-09 | Stop reason: HOSPADM

## 2025-04-30 RX ORDER — POLYETHYLENE GLYCOL 3350 17 G/17G
17 POWDER, FOR SOLUTION ORAL DAILY PRN
Status: DISCONTINUED | OUTPATIENT
Start: 2025-04-30 | End: 2025-05-09 | Stop reason: HOSPADM

## 2025-04-30 RX ORDER — IOPAMIDOL 755 MG/ML
75 INJECTION, SOLUTION INTRAVASCULAR
Status: COMPLETED | OUTPATIENT
Start: 2025-04-30 | End: 2025-04-30

## 2025-04-30 RX ORDER — ROPINIROLE 1 MG/1
1 TABLET, FILM COATED ORAL 3 TIMES DAILY
Status: DISCONTINUED | OUTPATIENT
Start: 2025-05-01 | End: 2025-05-09 | Stop reason: HOSPADM

## 2025-04-30 RX ORDER — IBUPROFEN 600 MG/1
1 TABLET ORAL
Status: DISCONTINUED | OUTPATIENT
Start: 2025-04-30 | End: 2025-05-09 | Stop reason: HOSPADM

## 2025-04-30 RX ORDER — CLOPIDOGREL BISULFATE 75 MG/1
75 TABLET ORAL DAILY
Status: DISCONTINUED | OUTPATIENT
Start: 2025-04-30 | End: 2025-05-09 | Stop reason: HOSPADM

## 2025-04-30 RX ORDER — BUSPIRONE HYDROCHLORIDE 10 MG/1
30 TABLET ORAL 2 TIMES DAILY
Status: DISCONTINUED | OUTPATIENT
Start: 2025-05-01 | End: 2025-05-09 | Stop reason: HOSPADM

## 2025-04-30 RX ORDER — ATORVASTATIN CALCIUM 40 MG/1
80 TABLET, FILM COATED ORAL NIGHTLY
Status: DISCONTINUED | OUTPATIENT
Start: 2025-04-30 | End: 2025-05-02

## 2025-04-30 RX ORDER — DEXTROSE MONOHYDRATE 25 G/50ML
25 INJECTION, SOLUTION INTRAVENOUS
Status: DISCONTINUED | OUTPATIENT
Start: 2025-04-30 | End: 2025-05-09 | Stop reason: HOSPADM

## 2025-04-30 RX ADMIN — IOPAMIDOL 75 ML: 755 INJECTION, SOLUTION INTRAVENOUS at 16:39

## 2025-04-30 RX ADMIN — MIDAZOLAM HYDROCHLORIDE 1 MG: 1 INJECTION, SOLUTION INTRAMUSCULAR; INTRAVENOUS at 16:13

## 2025-04-30 RX ADMIN — HYDROMORPHONE HYDROCHLORIDE 1 MG: 1 INJECTION, SOLUTION INTRAMUSCULAR; INTRAVENOUS; SUBCUTANEOUS at 16:11

## 2025-04-30 RX ADMIN — BUSPIRONE HYDROCHLORIDE 30 MG: 10 TABLET ORAL at 23:54

## 2025-04-30 RX ADMIN — HYDROMORPHONE HYDROCHLORIDE 1 MG: 1 INJECTION, SOLUTION INTRAMUSCULAR; INTRAVENOUS; SUBCUTANEOUS at 21:40

## 2025-04-30 RX ADMIN — GABAPENTIN 600 MG: 300 CAPSULE ORAL at 23:54

## 2025-04-30 RX ADMIN — Medication 50 MG: at 16:16

## 2025-04-30 RX ADMIN — ALPRAZOLAM 0.5 MG: 0.5 TABLET ORAL at 23:54

## 2025-04-30 RX ADMIN — INSULIN LISPRO 2 UNITS: 100 INJECTION, SOLUTION INTRAVENOUS; SUBCUTANEOUS at 21:49

## 2025-04-30 RX ADMIN — ALLOPURINOL 100 MG: 100 TABLET ORAL at 23:54

## 2025-04-30 RX ADMIN — CLOPIDOGREL BISULFATE 75 MG: 75 TABLET, FILM COATED ORAL at 17:44

## 2025-04-30 RX ADMIN — Medication 10 ML: at 21:44

## 2025-04-30 RX ADMIN — Medication 10 ML: at 23:54

## 2025-04-30 RX ADMIN — ATORVASTATIN CALCIUM 80 MG: 40 TABLET, FILM COATED ORAL at 23:54

## 2025-04-30 NOTE — CONSULTS
Stroke Consult Note    Patient Name: Phoebe Call   MRN: [id number]  Age: 70 y.o.  Sex: female  : 1955    Primary Care Physician: Provider, No Known  Referring Physician:  Dr. Gray Hedrick    TIME STROKE TEAM CALLED: 1528 EST     TIME PATIENT SEEN: 1543 EST    Handedness: Right  Race:      Chief Complaint/Reason for Consultation: Right sided weakness, numbness, and dysarthria    HPI: Ms. Call is a 70-year-old female with known medical diagnoses of essential hypertension, diabetes mellitus type 2, rheumatoid arthritis, history of pancreatic cancer (), COPD, chronic dysphagia, remote stroke (, residual left-sided weakness), and chronic pain 2/2 injury with broken back s/p reconstruction and insertion of pain pump presents to the emergency department via EMS for further evaluation of right-sided weakness, numbness, and dysarthria.  Patient states she was in her normal state of health last evening when she went to bed and remembers being able to move her right side normally at 0300.  She states that her caregiver woke her up at approximately [id number] this morning and asked her if she wanted to get up.  At that time she tried to take a drink of water and noticed that she was unable to hold the glass with her right hand and was having difficulty with coordination.  EMS reports that on their arrival that there was no one else at the house.  They report that she is normally able to ambulate independently however is unable to stand up due to her chronic back problems.    She tells me that she does not take any antiplatelet or anticoagulation medications.    Last Known Normal Date/Time: 2025 0300 EST     Review of Systems   Constitutional:  Positive for activity change. Negative for chills, fatigue and fever.   HENT:  Positive for trouble swallowing (Chronic).    Eyes:  Negative for photophobia and visual disturbance.   Respiratory: Negative.     Cardiovascular: Negative.     Gastrointestinal: Negative.    Genitourinary: Negative.    Musculoskeletal:  Positive for back pain and gait problem.   Skin: Negative.    Neurological:  Positive for speech difficulty, weakness, numbness and headaches. Negative for dizziness and facial asymmetry.   Psychiatric/Behavioral: Negative.        Past Medical History:   Diagnosis Date    Diabetes     History of transfusion     [place], no reaction    Hypertension     RA (rheumatoid arthritis)      Past Surgical History:   Procedure Laterality Date    BLADDER SUSPENSION      HERNIA REPAIR      HYSTERECTOMY      MASTOID SURGERY      PAIN PUMP INSERTION/REVISION N/A 6/21/2024    Procedure: INTRATHECAL CATHETER AND PAIN PUMP IMPLANT;  Surgeon: Cristi Egan MD;  Location: [facility];  Service: Pain Management;  Laterality: N/A;     No family history on file.  Social History     Socioeconomic History    Marital status: Single     Allergies   Allergen Reactions    Cymbalta [Duloxetine Hcl] Swelling     Blisters    Norco [Hydrocodone-Acetaminophen] Anaphylaxis    Amoxicillin-Pot Clavulanate Diarrhea     Pt gets a yeast infection.    Aspirin Hives    Ibuprofen Rash     Prior to Admission medications    Medication Sig Start Date End Date Taking? Authorizing Provider   albuterol sulfate  (90 Base) MCG/ACT inhaler INHALE 2 PUFFS BY MOUTH EVERY 4 TO 6 HOURS AS NEEDED 5/29/24   Ariel Cuenca MD   allopurinol (ZYLOPRIM) 100 MG tablet Take 1 tablet by mouth 2 (Two) Times a Day.    Ariel Cuenca MD   ALPRAZolam (XANAX) 0.5 MG tablet TAKE 1 TABLET BY MOUTH 3 TIMES A DAY 6/6/24   Ariel Cuenca MD   amLODIPine (NORVASC) 5 MG tablet Take 1 tablet by mouth Daily.    Ariel Cuenca MD   atenolol (TENORMIN) 25 MG tablet Take 1 tablet by mouth Daily.    Ariel Cuenca MD   baclofen (LIORESAL) 10 MG tablet Take 1 tablet by mouth 3 (Three) Times a Day.    Ariel Cuenca MD   busPIRone (BUSPAR) 10 MG tablet Take 3  tablets by mouth 2 (Two) Times a Day. 5/23/24   Ariel Cuenca MD   cephalexin (KEFLEX) 500 MG capsule Take 1 capsule 3 times a day by oral route for 10 days, for sinus infection. 5/29/24   Ariel Cuenca MD   clotrimazole-betamethasone (LOTRISONE) 1-0.05 % cream APPLY TO THE AFFECTED AND SURROUNDING AREAS OF SKIN BY TOPICAL ROUTE 2 TIMES PER DAY IN THE MORNING AND EVENING FOR 2 WEEKS    Ariel Cuenca MD   diphenoxylate-atropine (LOMOTIL) 2.5-0.025 MG per tablet Take 2 tablets by mouth 4 (Four) Times a Day.    Ariel Cuenca MD   erythromycin (ROMYCIN) 5 MG/GM ophthalmic ointment Apply  to eye(s) as directed by provider 4 (Four) Times a Day.    Ariel Cuenca MD   gabapentin (NEURONTIN) 600 MG tablet Take 1 tablet by mouth 3 (Three) Times a Day.    Ariel Cuenca MD   glyburide (DIAbeta) 5 MG tablet Take 1 tablet by mouth Daily With Breakfast.    Ariel Cuenca MD   losartan (COZAAR) 100 MG tablet Take 1 tablet by mouth Daily.    Ariel Cuenca MD   metFORMIN (GLUCOPHAGE) 500 MG tablet Take 1 tablet by mouth 2 (Two) Times a Day With Meals.    Ariel Cuenca MD   Mirabegron ER (Myrbetriq) 50 MG tablet sustained-release 24 hour 24 hr tablet Take 50 mg by mouth Daily.    Ariel Cuenca MD   omeprazole (priLOSEC) 20 MG capsule Take 1 capsule by mouth Daily.    Ariel Cuenca MD   promethazine (PHENERGAN) 25 MG tablet Take 1 tablet by mouth Every 6 (Six) Hours As Needed for Nausea or Vomiting.    Ariel Cuenca MD   rOPINIRole (REQUIP) 1 MG tablet Take 1 tablet by mouth 3 (Three) Times a Day.    Ariel Cuenca MD   Vibegron (Gemtesa) 75 MG tablet Take 1 tablet by mouth Daily.    Ariel Cuenca MD         Heart Rate:  [108] 108  Resp:  [18] 18  BP: (142)/(88) 142/88  Neurological Exam  Mental Status  Alert. Oriented to person, place, time and situation. Oriented to person, place, and time. Mild dysarthria present. Language  is fluent with no aphasia. Attention and concentration are normal.    Cranial Nerves  CN II: Visual fields full to confrontation.  CN III, IV, VI: Extraocular movements intact bilaterally. Pupils equal round and reactive to light bilaterally.  CN V: Facial sensation is normal.  CN VII:  Right: There is central facial weakness.  CN VIII: Hearing is intact bilaterally.  CN XII: Tongue midline without atrophy or fasciculations.    Motor  Decreased muscle bulk throughout. Decreased muscle tone.  Bilateral lower extremities with 2/5 strength  Right upper extremity with drift, 4-/5 strength  Left upper extremity with no drift, 4+/5 strength.    Sensory  Light touch abnormality:     Coordination    No obvious dysmetria to proportion weakness noted.    Gait    Not observed.      Physical Exam  Vitals and nursing note reviewed.   Constitutional:       General: She is not in acute distress.     Appearance: She is ill-appearing.      Comments: Cachectic   HENT:      Head: Normocephalic.      Mouth/Throat:      Mouth: Mucous membranes are dry.   Eyes:      Extraocular Movements: Extraocular movements intact.      Pupils: Pupils are equal, round, and reactive to light.   Cardiovascular:      Rate and Rhythm: Normal rate.   Pulmonary:      Effort: Pulmonary effort is normal. No respiratory distress.      Comments: On 3 L nasal cannula  Musculoskeletal:         General: Swelling and tenderness present.      Right lower leg: Edema present.      Left lower leg: Edema present.   Skin:     General: Skin is warm and dry.      Findings: Erythema present.   Neurological:      Mental Status: She is alert and oriented to person, place, and time.      Cranial Nerves: Cranial nerve deficit and dysarthria present.      Sensory: Sensory deficit present.      Motor: Weakness present.   Psychiatric:         Mood and Affect: Mood normal.         Behavior: Behavior normal.         Acute Stroke Data    Thrombolytic Inclusion / Exclusion  Criteria    Time: 15:54 EDT  Person Administering Scale: ViktoriaSANDIP Swan      YES NO INCLUSION CRITERIA CLASS I   [] [x]   Suspected diagnosis of acute ischemic stroke with measureable neurological deficit.  Low NIHSS with disabling stroke symptoms.   [] [x]   Onset of stroke symptoms < 3 hours before beginning treatment >/ 18 years old  Stroke symptom onset = time patient was last seen well or without symptoms (LKW)   [] [x]   Onset of symptoms between 3-4.5 hours: >/= 80 years old (safe Class IIa) with history of   both diabetes and prior CVA  (reasonable Class IIb) AND NIHSS </= 25  *If not eligible for IV Thrombolytic consider neuro intervention for LKW within 24 hours     YES NO EXCLUSION CRITERIA (CONTRAINDICATIONS) CLASS III EVIDENCE HARM   [] []   Blood pressure >185/110 medically refractory to IV medications   [] []   Active bleeding at a non-compressible site   [] []   Active intracranial hemorrhage (ICH)   [] []   Symptoms suggestive of subarachnoid hemorrhage (SAH)   [] []   GI bleed within 21 days   [] []   Ischemic stroke within 3 months   [] []   Severe head trauma within 3 months    [] []   Intracranial or intraspinal surgery within 3 months   [] []   Current GI malignancy   [] []   Intracranial neoplasm   [] []   Infective endocarditis   [] []   Aortic arch dissection   [] []   Active coagulopathy with  INR >1.7, platelets <100,000, PTT > 40 sec, PT > 15 sec   *For warfarin, administration can begin before blood tests resulted. Discontinue for above values.    [] []   Treatment dose* of LMWH (Lovenox) in last 24 hours  *prophylactic dosages are not a contraindication   [] []   Concurrent use of antiplatelet agents' glycoprotein inhibitors IIb/IIIa (Integrilin, etc.)   [] []   Thrombin or factor Xa inhibitors (Eliquis, Xarelto, Arixtra) taken in last 48 hours     YES NO CLASS II: AIS WITH THE FOLLOWING CONDITIONS -   TREATMENT RISKS SHOULD BE WEIGHED AGAINST POSSIBLE BENEFITS.    [] []    Major trauma in last 14 days, recent major surgery in last 14 days, intracranial arterial dissection, giant unruptured and unsecured intracranial aneurysm, pericarditis     [] []   The risks and benefits have been discussed with the patient or family related to the administration of IV thrombolytic therapy for stroke symptoms.   [] []   I have discussed and reviewed the patient's case and imaging with the attending prior to IV thrombolytic therapy.   TIME N/A Time IV thrombolytic administered       Hospital Meds:  Scheduled- HYDROmorphone, 1 mg, Intravenous, Once  midazolam, 1 mg, Intravenous, Once      Infusions-     PRNs-   sodium chloride    Functional Status Prior to Current Stroke/Keely Score: 3    NIH Stroke Scale  Time: 15:54 EDT  Person Administering Scale: SANDIP Young    Interval: baseline  1a. Level of Consciousness: 0-->Alert, keenly responsive  1b. LOC Questions: 0-->Answers both questions correctly  1c. LOC Commands: 0-->Performs both tasks correctly  2. Best Gaze: 0-->Normal  3. Visual: 0-->No visual loss  4. Facial Palsy: 1-->Minor paralysis (flattened nasolabial fold, asymmetry on smiling)  5a. Motor Arm, Left: 0-->No drift, limb holds 90 (or 45) degrees for full 10 secs  5b. Motor Arm, Right: 1-->Drift, limb holds 90 (or 45) degrees, but drifts down before full 10 secs, does not hit bed or other support  6a. Motor Leg, Left: 3-->No effort against gravity, leg falls to bed immediately  6b. Motor Leg, Right: 3-->No effort against gravity, leg falls to bed immediately  7. Limb Ataxia: 0-->Absent  8. Sensory: 1-->Mild-to-moderate sensory loss, patient feels pinprick is less sharp or is dull on the affected side, or there is a loss of superficial pain with pinprick, but patient is aware of being touched  9. Best Language: 0-->No aphasia, normal  10. Dysarthria: 1-->Mild-to-moderate dysarthria, patient slurs at least some words and, at worst, can be understood with some difficulty  11.  Extinction and Inattention (formerly Neglect): 0-->No abnormality    Total (NIH Stroke Scale): 10      Results Reviewed:  I have personally reviewed current lab, radiology, and data and agree with results.    CT Angiogram Head w AI Analysis of LVO  Result Date: 4/30/2025  Impression: Mild cervical and intracranial atherosclerotic changes are present as above, without evidence of associated high-grade stenosis or large vessel occlusion. 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 Electronically Signed: Barney Valle MD  4/30/2025 4:51 PM EDT  Workstation ID: [id number]    CT Angiogram Neck  Result Date: 4/30/2025  Impression: Mild cervical and intracranial atherosclerotic changes are present as above, without evidence of associated high-grade stenosis or large vessel occlusion. 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 Electronically Signed: Barney Valle MD  4/30/2025 4:51 PM EDT  Workstation ID: [id number]    CT Head Without Contrast Stroke Protocol  Result Date: 4/30/2025  Impression: CT scan of the head without IV contrast demonstrating mild prominence of the ventricles which may be due to atrophy or mild ventricular hypertension. Mild white matter changes consistent with gliosis and/or scattered old lacunar infarcts. No acute intracranial abnormality is seen. Electronically Signed: Bj Champagne MD  4/30/2025 4:48 PM EDT  Workstation ID: [id number]    WBC   Date Value Ref Range Status   04/30/2025 7.95 3.40 - 10.80 10*3/mm3 Final     Hemoglobin   Date Value Ref Range Status   04/30/2025 12.3 12.0 - 15.9 g/dL Final   04/30/2025 12.9 12.0 - 17.0 g/dL Final     Comment:     Serial Number: [id number]:  [id number]     Hematocrit   Date Value Ref Range Status    04/30/2025 37.7 34.0 - 46.6 % Final   04/30/2025 38 38 - 51 % Final     Platelets   Date Value Ref Range Status   04/30/2025 257 140 - 450 10*3/mm3 Final     Assessment/Plan:    This is a 70-year-old female with known medical diagnoses of essential hypertension, diabetes mellitus type 2, rheumatoid arthritis, history of pancreatic cancer (2011), COPD, chronic dysphagia, remote stroke (1990s, residual left-sided weakness), and chronic pain 2/2 injury with broken back s/p reconstruction and chronic kyphosis with insertion of pain pump presents to the emergency department via EMS for further evaluation of right-sided weakness, numbness, and dysarthria.  Patient's LKW was 0300 this morning therefore she is not a candidate for IV thrombolytic therapy.  There was a delay in obtaining CT imaging secondary to patient's pain and chronic kyphosis she required sedation to assist with lying flat on the scanner.  CTA head/neck is negative for flow-limiting stenosis or LVO; incidental LMCA aneurysm noted.  Patient will require admission to the hospitalist service for further evaluation.    Antiplatelet PTA: None  Anticoagulant PTA: None        Right sided weakness/numbness and dysarthria, etiology suspected left hemispheric stroke        History of right hemispheric stroke with residual left sided weakness         Left MCA aneurysm 3mm x 5mm x 7mm; incidental  -TIA/CVA order set without thrombolytic therapy has been initiated  -NPO until bedside nursing dysphagia screen completed  -MRI brain cannot be obtained 2/2 kyposis and pain pump, consider repeat CT head in AM  -TTE in AM  -A1c and lipid panel in AM  -Meds: Plavix 75mg for now, patient cannot have ASA 2/2 hives  -Activity as tolerated, fall risk precautions  -PT/OT/SLP evaluation  -Will need to follow-up with neurosurgery outpatient for incidental aneurysm     2.  Essential hypertension  -Allow autoregulation of blood pressure for adequate cerebral blood  flow  -Nicardipine as needed for SBP >220    3.  Hyperlipidemia  -Lipid panel in AM  -Atorvastatin 80mg nightly    4.  Diabetes Mellitus type 2  -A1c in AM  -Maintain euglycemia  -Management per primary team    Plan of care was discussed with patient, Dr. Hedrick (ED physician), and Dr. Angel (vascular neurology).  Stroke neurology will continue to follow.  Please call with any questions or concerns.  Thank you for this consult.     Viktoria Kessler, SANDIP  April 30, 2025  15:54 EDT

## 2025-04-30 NOTE — ED PROVIDER NOTES
[place]    EMERGENCY DEPARTMENT ENCOUNTER      Pt Name: Phoebe Call  MRN: [id number]  YOB: 1955  Date of evaluation: 4/30/2025  Provider: Gray Hedrick MD    CHIEF COMPLAINT       Chief Complaint   Patient presents with    Extremity Weakness         HISTORY OF PRESENT ILLNESS   Phoebe Call is a 70 y.o. female who presents to the emergency department with R sided weakness, present on awakening, currently slightly improved. Patient has hx of L sided weakness due to previous stroke. Pt c/o no HA, neck or chest pain. No visual changes.       Nursing notes were reviewed.    REVIEW OF SYSTEMS     ROS:  A chief complaint appropriate review of systems was completed and is negative except as noted in the HPI.      PAST MEDICAL HISTORY     Past Medical History:   Diagnosis Date    Diabetes     History of transfusion     [facility], no reaction    Hypertension     RA (rheumatoid arthritis)          SURGICAL HISTORY       Past Surgical History:   Procedure Laterality Date    BLADDER SUSPENSION      HERNIA REPAIR      HYSTERECTOMY      MASTOID SURGERY      PAIN PUMP INSERTION/REVISION N/A 6/21/2024    Procedure: INTRATHECAL CATHETER AND PAIN PUMP IMPLANT;  Surgeon: Cristi Egan MD;  Location: [facility];  Service: Pain Management;  Laterality: N/A;         CURRENT MEDICATIONS       Current Facility-Administered Medications:     acetaminophen (TYLENOL) tablet 650 mg, 650 mg, Oral, Q4H PRN, James Hatch DO, 650 mg at 05/03/25 0538    allopurinol (ZYLOPRIM) tablet 100 mg, 100 mg, Oral, BID, James Hatch DO, 100 mg at 05/03/25 0920    ALPRAZolam (XANAX) tablet 0.5 mg, 0.5 mg, Oral, TID PRN, Maricel Posadas DO    aluminum-magnesium hydroxide-simethicone (MAALOX MAX) 400-400-40 MG/5ML suspension 15 mL, 15 mL, Oral, Q6H PRN, James Hatch DO    atorvastatin (LIPITOR) tablet 40 mg, 40 mg, Oral, Nightly, Maricel Posadas DO, 40 mg at 05/02/25 2053    baclofen (LIORESAL)  tablet 10 mg, 10 mg, Oral, TID, James Hatch DO, 10 mg at 05/03/25 0928    sennosides-docusate (PERICOLACE) 8.6-50 MG per tablet 2 tablet, 2 tablet, Oral, BID PRN **AND** polyethylene glycol (MIRALAX) packet 17 g, 17 g, Oral, Daily PRN **AND** bisacodyl (DULCOLAX) EC tablet 5 mg, 5 mg, Oral, Daily PRN **AND** bisacodyl (DULCOLAX) suppository 10 mg, 10 mg, Rectal, Daily PRN, James Hatch DO    busPIRone (BUSPAR) tablet 30 mg, 30 mg, Oral, BID, James Hatch DO, 30 mg at 05/03/25 0920    Calcium Replacement - Follow Nurse / BPA Driven Protocol, , Not Applicable, PRN, James Hatch,     clopidogrel (PLAVIX) tablet 75 mg, 75 mg, Oral, Daily, Viktoria Kessler, APRN, 75 mg at 05/03/25 0920    clotrimazole-betamethasone (LOTRISONE) 1-0.05 % cream, , Topical, Q12H, James Hatch DO, Given at 05/03/25 0930    dextrose (D50W) (25 g/50 mL) IV injection 25 g, 25 g, Intravenous, Q15 Min PRN, James Hatch DO    dextrose (GLUTOSE) oral gel 15 g, 15 g, Oral, Q15 Min PRN, James Hatch DO    diphenhydrAMINE (BENADRYL) capsule 25 mg, 25 mg, Oral, Q6H PRN, Maricel Posadas DO, 25 mg at 05/02/25 1015    fluconazole (DIFLUCAN) tablet 100 mg, 100 mg, Oral, Q24H, Maricel Posadas DO, 100 mg at 05/03/25 0920    gabapentin (NEURONTIN) capsule 600 mg, 600 mg, Oral, Q8H, James Hatch DO, 600 mg at 05/03/25 0535    glucagon (GLUCAGEN) injection 1 mg, 1 mg, Intramuscular, Q15 Min PRN, James Hatch DO    Insulin Lispro (humaLOG) injection 2-9 Units, 2-9 Units, Subcutaneous, 4x Daily AC & at Bedtime, James Hatch, DO, 2 Units at 05/03/25 0919    Magnesium Standard Dose Replacement - Follow Nurse / BPA Driven Protocol, , Not Applicable, PRN, James Hatch, DO    melatonin tablet 5 mg, 5 mg, Oral, Nightly PRN, James Hatch,     nitroglycerin (NITROSTAT) SL tablet 0.4 mg, 0.4 mg, Sublingual, Q5 Min PRN, James Hatch,     ondansetron (ZOFRAN) injection 4  mg, 4 mg, Intravenous, Q6H PRNMamie Richard M, DO    Phosphorus Replacement - Follow Nurse / BPA Driven Protocol, , Not Applicable, Mamie CHINO Richard M, DO    Potassium Replacement - Follow Nurse / BPA Driven Protocol, , Not Applicable, Mamie CHINO Richard M, DO    rOPINIRole (REQUIP) tablet 1 mg, 1 mg, Oral, TID, James Hatch DO, 1 mg at 05/03/25 0920    sodium chloride 0.9 % flush 10 mL, 10 mL, Intravenous, PRN, Preeti Tinoco MD    sodium chloride 0.9 % flush 10 mL, 10 mL, Intravenous, Q12H, Viktoria Kessler APRN, 10 mL at 05/01/25 0943    sodium chloride 0.9 % flush 10 mL, 10 mL, Intravenous, PRN, Viktoria Kessler APRN    sodium chloride 0.9 % flush 10 mL, 10 mL, Intravenous, Q12H, James Hatch DO, 10 mL at 05/03/25 0924    sodium chloride 0.9 % flush 10 mL, 10 mL, Intravenous, PRN, James Hatch,     sodium chloride 0.9 % infusion 40 mL, 40 mL, Intravenous, PRN, Viktoria Kessler APRN    sodium chloride 0.9 % infusion 40 mL, 40 mL, Intravenous, PRN, James Hatch,     ALLERGIES     Cymbalta [duloxetine hcl], Norco [hydrocodone-acetaminophen], Amoxicillin-pot clavulanate, Aspirin, and Ibuprofen    FAMILY HISTORY     History reviewed. No pertinent family history.       SOCIAL HISTORY       Social History     Socioeconomic History    Marital status: Single   Tobacco Use    Smoking status: Some Days     Current packs/day: 0.25     Types: Cigarettes    Smokeless tobacco: Never   Vaping Use    Vaping status: Never Used   Substance and Sexual Activity    Alcohol use: Never    Drug use: Never    Sexual activity: Defer         PHYSICAL EXAM    (up to 7 for level 4, 8 or more for level 5)     Vitals:    05/02/25 1903 05/03/25 0024 05/03/25 0524 05/03/25 0705   BP: 133/67 129/57 174/74 149/71   BP Location: Left arm Right arm Right arm Left arm   Patient Position: Lying Lying Lying Lying   Pulse: 71 69 81 88   Resp: 16 16 16 18   Temp: 98.4 °F (36.9 °C) 97.6  °F (36.4 °C) 98.1 °F (36.7 °C) 98.4 °F (36.9 °C)   TempSrc: Oral Axillary Axillary Oral   SpO2:  99% 93%    Weight:       Height:           General: Awake, alert, no acute distress.  HEENT: Conjunctivae normal.  Neck: Trachea midline.  Cardiac: Heart regular rate, rhythm, no murmurs, rubs, or gallops  Lungs: Lungs are clear to auscultation, there is no wheezing, rhonchi, or rales. There is no use of accessory muscles.  Chest wall: There is no tenderness to palpation over the chest wall or over ribs  Abdomen: Abdomen is soft, nontender, nondistended. There are no firm or pulsatile masses, no rebound rigidity or guarding.   Musculoskeletal: No deformity.  Neuro: Alert and oriented x 4. Mild R sided weakness and sensory deficit.  Dermatology: Skin is warm and dry  Psych: Mentation is grossly normal, cognition is grossly normal. Affect is appropriate.        DIAGNOSTIC RESULTS     EKG: All EKGs are interpreted by the Emergency Department Physician who either signs or Co-signs this chart in the absence of a cardiologist.    ECG 12 Lead ED Triage Standing Order; Acute Stroke (Onset <24 hrs)   Preliminary Result   Test Reason : ED Triage Standing Order~   Blood Pressure :   */*   mmHG   Vent. Rate : 100 BPM     Atrial Rate : 100 BPM      P-R Int : 174 ms          QRS Dur :  88 ms       QT Int : 342 ms       P-R-T Axes :  69 -48  45 degrees     QTcB Int : 441 ms      Normal sinus rhythm   Left axis deviation   Inferior infarct (cited on or before 14-Jun-2024)   Anteroseptal infarct (cited on or before 14-Jun-2024)   Abnormal ECG   When compared with ECG of 14-Jun-2024 15:31,   Questionable change in initial forces of Septal leads      Referred By: EDMD           Confirmed By:             RADIOLOGY:   [x] Radiologist's Report Reviewed:  CT Head Without Contrast   Final Result   Impression:   1.No acute intracranial process identified.   2.Mild paranasal sinus mucosal disease.            Electronically Signed: Cristi  MD Haile     5/2/2025 12:16 PM EDT     Workstation ID: [id number]      XR Chest 1 View   Final Result   Impression:   Subsegmental atelectasis and/are linear fibrosis is seen at the right lung base.         Electronically Signed: Bj Champagne MD     4/30/2025 5:21 PM EDT     Workstation ID: [id number]      CT Angiogram Head w AI Analysis of LVO   Final Result   Impression:   Mild cervical and intracranial atherosclerotic changes are present as above, without evidence of associated high-grade stenosis or large vessel occlusion.      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            Electronically Signed: Barney Valle MD     4/30/2025 4:51 PM EDT     Workstation ID: [id number]      CT Angiogram Neck   Final Result   Impression:   Mild cervical and intracranial atherosclerotic changes are present as above, without evidence of associated high-grade stenosis or large vessel occlusion.      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            Electronically Signed: Barney Valle MD     4/30/2025 4:51 PM EDT     Workstation ID: [id number]      CT Head Without Contrast Stroke Protocol   Final Result   Impression:   CT scan of the head without IV contrast demonstrating mild prominence of the ventricles which may be due to atrophy or mild ventricular hypertension.      Mild white matter changes consistent with gliosis and/or scattered old lacunar infarcts.      No acute intracranial abnormality is seen.            Electronically Signed: Bj Champagne MD     4/30/2025 4:48 PM EDT     Workstation ID: [id number]          I ordered and independently reviewed the above noted radiographic studies.        LABS:    I have reviewed and interpreted all of the currently  available lab results from this visit (if applicable):  Results for orders placed or performed during the hospital encounter of 04/30/25   POC CHEM 8    Collection Time: 04/30/25  3:55 PM    Specimen: Blood   Result Value Ref Range    Glucose 118 70 - 130 mg/dL    BUN 12 8 - 26 mg/dL    Creatinine 0.40 (L) 0.60 - 1.30 mg/dL    Sodium 134 (L) 138 - 146 mmol/L    POC Potassium 5.8 (H) 3.5 - 4.9 mmol/L    Chloride 99 98 - 109 mmol/L    Total CO2 30 (H) 24 - 29 mmol/L    Hemoglobin 12.9 12.0 - 17.0 g/dL    Hematocrit 38 38 - 51 %    Ionized Calcium 1.13 (L) 1.20 - 1.32 mmol/L    eGFR 108.0 >60.0 mL/min/1.73   Protime-INR    Collection Time: 04/30/25  3:58 PM    Specimen: Blood   Result Value Ref Range    Protime 14.3 12.2 - 15.3 Seconds    INR 1.05 0.89 - 1.12   aPTT    Collection Time: 04/30/25  3:58 PM    Specimen: Blood   Result Value Ref Range    PTT 37.9 22.0 - 39.0 seconds   CBC Auto Differential    Collection Time: 04/30/25  3:58 PM    Specimen: Blood   Result Value Ref Range    WBC 7.95 3.40 - 10.80 10*3/mm3    RBC 4.48 3.77 - 5.28 10*6/mm3    Hemoglobin 12.3 12.0 - 15.9 g/dL    Hematocrit 37.7 34.0 - 46.6 %    MCV 84.2 79.0 - 97.0 fL    MCH 27.5 26.6 - 33.0 pg    MCHC 32.6 31.5 - 35.7 g/dL    RDW 15.7 (H) 12.3 - 15.4 %    RDW-SD 48.7 37.0 - 54.0 fl    MPV 10.9 6.0 - 12.0 fL    Platelets 257 140 - 450 10*3/mm3    Neutrophil % 55.3 42.7 - 76.0 %    Lymphocyte % 32.8 19.6 - 45.3 %    Monocyte % 7.3 5.0 - 12.0 %    Eosinophil % 3.8 0.3 - 6.2 %    Basophil % 0.4 0.0 - 1.5 %    Immature Grans % 0.4 0.0 - 0.5 %    Neutrophils, Absolute 4.40 1.70 - 7.00 10*3/mm3    Lymphocytes, Absolute 2.61 0.70 - 3.10 10*3/mm3    Monocytes, Absolute 0.58 0.10 - 0.90 10*3/mm3    Eosinophils, Absolute 0.30 0.00 - 0.40 10*3/mm3    Basophils, Absolute 0.03 0.00 - 0.20 10*3/mm3    Immature Grans, Absolute 0.03 0.00 - 0.05 10*3/mm3    nRBC 0.0 0.0 - 0.2 /100 WBC   Green Top (Gel)    Collection Time: 04/30/25  3:58 PM   Result Value Ref  Range    Extra Tube Hold for add-ons.    Lavender Top    Collection Time: 04/30/25  3:58 PM   Result Value Ref Range    Extra Tube hold for add-on    Gold Top - SST    Collection Time: 04/30/25  3:58 PM   Result Value Ref Range    Extra Tube Hold for add-ons.    Gray Top    Collection Time: 04/30/25  3:58 PM   Result Value Ref Range    Extra Tube Hold for add-ons.    Light Blue Top    Collection Time: 04/30/25  3:58 PM   Result Value Ref Range    Extra Tube Hold for add-ons.    ECG 12 Lead ED Triage Standing Order; Acute Stroke (Onset <24 hrs)    Collection Time: 04/30/25  4:38 PM   Result Value Ref Range    QT Interval 342 ms    QTC Interval 441 ms   Comprehensive Metabolic Panel    Collection Time: 04/30/25  4:53 PM    Specimen: Blood   Result Value Ref Range    Glucose 127 (H) 65 - 99 mg/dL    BUN 9 8 - 23 mg/dL    Creatinine 0.35 (L) 0.57 - 1.00 mg/dL    Sodium 138 136 - 145 mmol/L    Potassium 4.0 3.5 - 5.2 mmol/L    Chloride 99 98 - 107 mmol/L    CO2 28.0 22.0 - 29.0 mmol/L    Calcium 9.9 8.6 - 10.5 mg/dL    Total Protein 7.2 6.0 - 8.5 g/dL    Albumin 4.1 3.5 - 5.2 g/dL    ALT (SGPT) 8 1 - 33 U/L    AST (SGOT) 28 1 - 32 U/L    Alkaline Phosphatase 167 (H) 39 - 117 U/L    Total Bilirubin 0.4 0.0 - 1.2 mg/dL    Globulin 3.1 gm/dL    A/G Ratio 1.3 g/dL    BUN/Creatinine Ratio 25.7 (H) 7.0 - 25.0    Anion Gap 11.0 5.0 - 15.0 mmol/L    eGFR 111.5 >60.0 mL/min/1.73   POC Glucose Once    Collection Time: 04/30/25  9:04 PM    Specimen: Blood   Result Value Ref Range    Glucose 151 (H) 70 - 130 mg/dL   POC Glucose Once    Collection Time: 05/01/25  3:29 AM    Specimen: Blood   Result Value Ref Range    Glucose 129 70 - 130 mg/dL   CBC Auto Differential    Collection Time: 05/01/25  7:35 AM    Specimen: Blood   Result Value Ref Range    WBC 7.49 3.40 - 10.80 10*3/mm3    RBC 4.27 3.77 - 5.28 10*6/mm3    Hemoglobin 11.6 (L) 12.0 - 15.9 g/dL    Hematocrit 38.3 34.0 - 46.6 %    MCV 89.7 79.0 - 97.0 fL    MCH 27.2 26.6 -  33.0 pg    MCHC 30.3 (L) 31.5 - 35.7 g/dL    RDW 16.1 (H) 12.3 - 15.4 %    RDW-SD 53.1 37.0 - 54.0 fl    MPV 10.5 6.0 - 12.0 fL    Platelets 222 140 - 450 10*3/mm3    Neutrophil % 50.3 42.7 - 76.0 %    Lymphocyte % 35.6 19.6 - 45.3 %    Monocyte % 9.2 5.0 - 12.0 %    Eosinophil % 4.1 0.3 - 6.2 %    Basophil % 0.5 0.0 - 1.5 %    Immature Grans % 0.3 0.0 - 0.5 %    Neutrophils, Absolute 3.76 1.70 - 7.00 10*3/mm3    Lymphocytes, Absolute 2.67 0.70 - 3.10 10*3/mm3    Monocytes, Absolute 0.69 0.10 - 0.90 10*3/mm3    Eosinophils, Absolute 0.31 0.00 - 0.40 10*3/mm3    Basophils, Absolute 0.04 0.00 - 0.20 10*3/mm3    Immature Grans, Absolute 0.02 0.00 - 0.05 10*3/mm3    nRBC 0.0 0.0 - 0.2 /100 WBC   Hemoglobin A1c    Collection Time: 05/01/25  7:35 AM    Specimen: Blood   Result Value Ref Range    Hemoglobin A1C 7.20 (H) 4.80 - 5.60 %   POC Glucose Once    Collection Time: 05/01/25  7:49 AM    Specimen: Blood   Result Value Ref Range    Glucose 104 70 - 130 mg/dL   Comprehensive Metabolic Panel    Collection Time: 05/01/25  9:32 AM    Specimen: Blood   Result Value Ref Range    Glucose 113 (H) 65 - 99 mg/dL    BUN 11 8 - 23 mg/dL    Creatinine 0.37 (L) 0.57 - 1.00 mg/dL    Sodium 138 136 - 145 mmol/L    Potassium 4.5 3.5 - 5.2 mmol/L    Chloride 100 98 - 107 mmol/L    CO2 27.0 22.0 - 29.0 mmol/L    Calcium 9.7 8.6 - 10.5 mg/dL    Total Protein 6.6 6.0 - 8.5 g/dL    Albumin 4.0 3.5 - 5.2 g/dL    ALT (SGPT) 14 1 - 33 U/L    AST (SGOT) 22 1 - 32 U/L    Alkaline Phosphatase 160 (H) 39 - 117 U/L    Total Bilirubin 0.5 0.0 - 1.2 mg/dL    Globulin 2.6 gm/dL    A/G Ratio 1.5 g/dL    BUN/Creatinine Ratio 29.7 (H) 7.0 - 25.0    Anion Gap 11.0 5.0 - 15.0 mmol/L    eGFR 110.0 >60.0 mL/min/1.73   Magnesium    Collection Time: 05/01/25  9:32 AM    Specimen: Blood   Result Value Ref Range    Magnesium 1.8 1.6 - 2.4 mg/dL   Phosphorus    Collection Time: 05/01/25  9:32 AM    Specimen: Blood   Result Value Ref Range    Phosphorus 3.6 2.5 -  4.5 mg/dL   Lipid Panel    Collection Time: 05/01/25  9:32 AM    Specimen: Blood   Result Value Ref Range    Total Cholesterol 96 0 - 200 mg/dL    Triglycerides 89 0 - 150 mg/dL    HDL Cholesterol 32 (L) 40 - 60 mg/dL    LDL Cholesterol  46 0 - 100 mg/dL    VLDL Cholesterol 18 5 - 40 mg/dL    LDL/HDL Ratio 1.44    POC Glucose Once    Collection Time: 05/01/25 12:17 PM    Specimen: Blood   Result Value Ref Range    Glucose 105 70 - 130 mg/dL   POC Glucose Once    Collection Time: 05/01/25  5:03 PM    Specimen: Blood   Result Value Ref Range    Glucose 140 (H) 70 - 130 mg/dL   POC Glucose Once    Collection Time: 05/01/25  7:38 PM    Specimen: Blood   Result Value Ref Range    Glucose 138 (H) 70 - 130 mg/dL   Urinalysis With Culture If Indicated - Urine, Clean Catch    Collection Time: 05/01/25  9:14 PM    Specimen: Urine, Clean Catch   Result Value Ref Range    Color, UA Yellow Yellow, Straw    Appearance, UA Clear Clear    pH, UA 5.5 5.0 - 8.0    Specific Gravity, UA 1.028 1.001 - 1.030    Glucose, UA Negative Negative    Ketones, UA Trace (A) Negative    Bilirubin, UA Negative Negative    Blood, UA Negative Negative    Protein, UA Trace (A) Negative    Leuk Esterase, UA Moderate (2+) (A) Negative    Nitrite, UA Negative Negative    Urobilinogen, UA 1.0 E.U./dL 0.2 - 1.0 E.U./dL   Urinalysis, Microscopic Only - Urine, Clean Catch    Collection Time: 05/01/25  9:14 PM    Specimen: Urine, Clean Catch   Result Value Ref Range    RBC, UA 0-2 None Seen, 0-2 /HPF    WBC, UA 21-50 (A) None Seen, 0-2 /HPF    Bacteria, UA None Seen None Seen, Trace /HPF    Squamous Epithelial Cells, UA 0-2 None Seen, 0-2 /HPF    Yeast, UA Moderate/2+ Budding Yeast (A) None Seen /HPF    Hyaline Casts, UA None Seen 0 - 6 /LPF    Calcium Oxalate Crystals, UA Small/1+ None Seen /HPF    Methodology Manual Light Microscopy    Renal Function Panel    Collection Time: 05/02/25  6:48 AM    Specimen: Blood   Result Value Ref Range    Glucose 112 (H)  65 - 99 mg/dL    BUN 11 8 - 23 mg/dL    Creatinine 0.33 (L) 0.57 - 1.00 mg/dL    Sodium 135 (L) 136 - 145 mmol/L    Potassium 4.4 3.5 - 5.2 mmol/L    Chloride 97 (L) 98 - 107 mmol/L    CO2 26.0 22.0 - 29.0 mmol/L    Calcium 9.5 8.6 - 10.5 mg/dL    Albumin 3.7 3.5 - 5.2 g/dL    Phosphorus 2.8 2.5 - 4.5 mg/dL    Anion Gap 12.0 5.0 - 15.0 mmol/L    BUN/Creatinine Ratio 33.3 (H) 7.0 - 25.0    eGFR 113.1 >60.0 mL/min/1.73   CBC Auto Differential    Collection Time: 05/02/25  6:48 AM    Specimen: Blood   Result Value Ref Range    WBC 7.65 3.40 - 10.80 10*3/mm3    RBC 4.46 3.77 - 5.28 10*6/mm3    Hemoglobin 12.2 12.0 - 15.9 g/dL    Hematocrit 39.6 34.0 - 46.6 %    MCV 88.8 79.0 - 97.0 fL    MCH 27.4 26.6 - 33.0 pg    MCHC 30.8 (L) 31.5 - 35.7 g/dL    RDW 15.7 (H) 12.3 - 15.4 %    RDW-SD 51.2 37.0 - 54.0 fl    MPV 10.7 6.0 - 12.0 fL    Platelets 176 140 - 450 10*3/mm3    Neutrophil % 53.6 42.7 - 76.0 %    Lymphocyte % 31.6 19.6 - 45.3 %    Monocyte % 11.1 5.0 - 12.0 %    Eosinophil % 3.0 0.3 - 6.2 %    Basophil % 0.4 0.0 - 1.5 %    Immature Grans % 0.3 0.0 - 0.5 %    Neutrophils, Absolute 4.10 1.70 - 7.00 10*3/mm3    Lymphocytes, Absolute 2.42 0.70 - 3.10 10*3/mm3    Monocytes, Absolute 0.85 0.10 - 0.90 10*3/mm3    Eosinophils, Absolute 0.23 0.00 - 0.40 10*3/mm3    Basophils, Absolute 0.03 0.00 - 0.20 10*3/mm3    Immature Grans, Absolute 0.02 0.00 - 0.05 10*3/mm3    nRBC 0.0 0.0 - 0.2 /100 WBC   POC Glucose Once    Collection Time: 05/02/25  7:22 AM    Specimen: Blood   Result Value Ref Range    Glucose 111 70 - 130 mg/dL   POC Glucose Once    Collection Time: 05/02/25 11:12 AM    Specimen: Blood   Result Value Ref Range    Glucose 247 (H) 70 - 130 mg/dL   POC Glucose Once    Collection Time: 05/02/25  4:20 PM    Specimen: Blood   Result Value Ref Range    Glucose 169 (H) 70 - 130 mg/dL   POC Glucose Once    Collection Time: 05/02/25  7:45 PM    Specimen: Blood   Result Value Ref Range    Glucose 159 (H) 70 - 130 mg/dL    POC Glucose Once    Collection Time: 05/03/25  7:39 AM    Specimen: Blood   Result Value Ref Range    Glucose 165 (H) 70 - 130 mg/dL        If labs were ordered, I independently reviewed the results and considered them in treating the patient.      EMERGENCY DEPARTMENT COURSE and DIFFERENTIAL DIAGNOSIS/MDM:   Vitals:  AS OF 10:18 EDT    BP - 149/71  HR - 88  TEMP - 98.4 °F (36.9 °C) (Oral)  O2 SATS - 93%        Discussion below represents my analysis of pertinent findings related to patient's condition, differential diagnosis, treatment plan and final disposition.      Differential diagnosis:  The differential diagnosis associated with the patient's presentation includes: CVA, ICH, intracranial mass, seizure, migraine, electrolyte derangement      Independent interpretations (ECG/rhythm strip/X-ray/US/CT scan): I independently interpreted the pt head CT and cardiac monitor - there is no ICH and the patient is in NSR      Additional sources:  Discussed/obtained information from independent historians:   [] Spouse:   [] Parent:   [] Friend:   [x] EMS: Report was taken from EMS - [facility] during transport   [] Other:  External (non-ED) record review:   [] Inpatient record:   [] Office record:   [] Outpatient record:   [] Prior Outpatient labs:   [] Prior Outpatient radiology:   [] Primary Care record:   [] Outside ED record:   [] Other:       Patient's care impacted by:   [] Diabetes   [x] Hypertension   [] Coronary Artery Disease   [] Cancer   [x] Other: RA    Care significantly affected by Social Determinants of Health (housing and economic circumstances, unemployment)    [] Yes     [x] No   If yes, Patient's care significantly limited by  Social Determinants of Health including:    [] Inadequate housing    [] Low income    [] Alcoholism and drug addiction in family    [] Problems related to primary support group    [] Unemployment    [] Problems related to employment    [] Other Social Determinants of Health:        Consideration of admission/observation vs discharge: Pt w findings concerning for CVA, warrants admission for further workup      I considered prescription management with:    [] Pain medication:   [] Antiviral:   [] Antibiotic:   [x] Other: Considered TNK however patient is outside of the time window    ED Course:    ED Course as of 05/03/25 1018   Wed Apr 30, 2025 1804 Patient presents with right-sided weakness upon awakening this morning. No symptoms when she went to bed around 3 AM this morning. She has a history of stroke with previous left-sided deficits. Right-sided deficits currently improving. Reportedly from EMS, had some dysarthria on their arrival that has now resolved. She has severe chronic back issues and pain and required ketamine to be able to obtain stroke imaging which did not reveal any evidence of LVO. Stroke team is following. [NS]   1804 I discussed case with hospitalist Dr. Schneider.  Discussed history, presentation, workup.  Accepts patient for admission. [NS]      ED Course User Index  [NS] Gray Hedrick MD           PROCEDURES:  Procedures    CRITICAL CARE TIME        FINAL IMPRESSION      1. Acute right-sided weakness    2. Right sided numbness    3. Dysarthria    4. History of CVA (cerebrovascular accident)    5. History of rheumatoid arthritis    6. History of diabetes mellitus    7. History of hypertension    8. Cognitive communication deficit          DISPOSITION/PLAN     ED Disposition       ED Disposition   Decision to Admit    Condition   --    Comment   Level of Care: Telemetry [5]   Diagnosis: CVA (cerebral vascular accident) [368879]   Admitting Physician: HEATHER PEÑALOZA [[id number]]   Attending Physician: HEATHER PEÑALOZA [[id number]]   Certification: I Certify That Inpatient Hospital Services Are Medically Necessary For Greater Than 2 Midnights                   Comment: Please note this report has been produced using speech recognition software.      Gray Hedrick  MD  Attending Emergency Physician             Gray Hedrick MD  05/03/25 6125

## 2025-04-30 NOTE — Clinical Note
Level of Care: Telemetry [5]   Diagnosis: Stroke-like symptoms [274110]   Admitting Physician: HEATHER PEÑALOZA [[id number]]   Attending Physician: HEATHER PEÑALOZA [[id number]]   Is patient appropriate for Inpatient Observation Unit?: Yes [1]

## 2025-05-01 LAB
ALBUMIN SERPL-MCNC: 4 G/DL (ref 3.5–5.2)
ALBUMIN/GLOB SERPL: 1.5 G/DL
ALP SERPL-CCNC: 160 U/L (ref 39–117)
ALT SERPL W P-5'-P-CCNC: 14 U/L (ref 1–33)
ANION GAP SERPL CALCULATED.3IONS-SCNC: 11 MMOL/L (ref 5–15)
AST SERPL-CCNC: 22 U/L (ref 1–32)
BACTERIA UR QL AUTO: ABNORMAL /HPF
BASOPHILS # BLD AUTO: 0.04 10*3/MM3 (ref 0–0.2)
BASOPHILS NFR BLD AUTO: 0.5 % (ref 0–1.5)
BILIRUB SERPL-MCNC: 0.5 MG/DL (ref 0–1.2)
BILIRUB UR QL STRIP: NEGATIVE
BUN SERPL-MCNC: 11 MG/DL (ref 8–23)
BUN/CREAT SERPL: 29.7 (ref 7–25)
CALCIUM SPEC-SCNC: 9.7 MG/DL (ref 8.6–10.5)
CHLORIDE SERPL-SCNC: 100 MMOL/L (ref 98–107)
CHOLEST SERPL-MCNC: 96 MG/DL (ref 0–200)
CLARITY UR: CLEAR
CO2 SERPL-SCNC: 27 MMOL/L (ref 22–29)
COD CRY URNS QL: ABNORMAL /HPF
COLOR UR: YELLOW
CREAT SERPL-MCNC: 0.37 MG/DL (ref 0.57–1)
DEPRECATED RDW RBC AUTO: 53.1 FL (ref 37–54)
EGFRCR SERPLBLD CKD-EPI 2021: 110 ML/MIN/1.73
EOSINOPHIL # BLD AUTO: 0.31 10*3/MM3 (ref 0–0.4)
EOSINOPHIL NFR BLD AUTO: 4.1 % (ref 0.3–6.2)
ERYTHROCYTE [DISTWIDTH] IN BLOOD BY AUTOMATED COUNT: 16.1 % (ref 12.3–15.4)
GLOBULIN UR ELPH-MCNC: 2.6 GM/DL
GLUCOSE BLDC GLUCOMTR-MCNC: 104 MG/DL (ref 70–130)
GLUCOSE BLDC GLUCOMTR-MCNC: 105 MG/DL (ref 70–130)
GLUCOSE BLDC GLUCOMTR-MCNC: 129 MG/DL (ref 70–130)
GLUCOSE BLDC GLUCOMTR-MCNC: 138 MG/DL (ref 70–130)
GLUCOSE BLDC GLUCOMTR-MCNC: 140 MG/DL (ref 70–130)
GLUCOSE SERPL-MCNC: 113 MG/DL (ref 65–99)
GLUCOSE UR STRIP-MCNC: NEGATIVE MG/DL
HBA1C MFR BLD: 7.2 % (ref 4.8–5.6)
HCT VFR BLD AUTO: 38.3 % (ref 34–46.6)
HDLC SERPL-MCNC: 32 MG/DL (ref 40–60)
HGB BLD-MCNC: 11.6 G/DL (ref 12–15.9)
HGB UR QL STRIP.AUTO: NEGATIVE
HYALINE CASTS UR QL AUTO: ABNORMAL /LPF
IMM GRANULOCYTES # BLD AUTO: 0.02 10*3/MM3 (ref 0–0.05)
IMM GRANULOCYTES NFR BLD AUTO: 0.3 % (ref 0–0.5)
KETONES UR QL STRIP: ABNORMAL
LDLC SERPL CALC-MCNC: 46 MG/DL (ref 0–100)
LDLC/HDLC SERPL: 1.44 {RATIO}
LEUKOCYTE ESTERASE UR QL STRIP.AUTO: ABNORMAL
LYMPHOCYTES # BLD AUTO: 2.67 10*3/MM3 (ref 0.7–3.1)
LYMPHOCYTES NFR BLD AUTO: 35.6 % (ref 19.6–45.3)
MAGNESIUM SERPL-MCNC: 1.8 MG/DL (ref 1.6–2.4)
MCH RBC QN AUTO: 27.2 PG (ref 26.6–33)
MCHC RBC AUTO-ENTMCNC: 30.3 G/DL (ref 31.5–35.7)
MCV RBC AUTO: 89.7 FL (ref 79–97)
MONOCYTES # BLD AUTO: 0.69 10*3/MM3 (ref 0.1–0.9)
MONOCYTES NFR BLD AUTO: 9.2 % (ref 5–12)
NEUTROPHILS NFR BLD AUTO: 3.76 10*3/MM3 (ref 1.7–7)
NEUTROPHILS NFR BLD AUTO: 50.3 % (ref 42.7–76)
NITRITE UR QL STRIP: NEGATIVE
NRBC BLD AUTO-RTO: 0 /100 WBC (ref 0–0.2)
PH UR STRIP.AUTO: 5.5 [PH] (ref 5–8)
PHOSPHATE SERPL-MCNC: 3.6 MG/DL (ref 2.5–4.5)
PLATELET # BLD AUTO: 222 10*3/MM3 (ref 140–450)
PMV BLD AUTO: 10.5 FL (ref 6–12)
POTASSIUM SERPL-SCNC: 4.5 MMOL/L (ref 3.5–5.2)
PROT SERPL-MCNC: 6.6 G/DL (ref 6–8.5)
PROT UR QL STRIP: ABNORMAL
RBC # BLD AUTO: 4.27 10*6/MM3 (ref 3.77–5.28)
RBC # UR STRIP: ABNORMAL /HPF
REF LAB TEST METHOD: ABNORMAL
SODIUM SERPL-SCNC: 138 MMOL/L (ref 136–145)
SP GR UR STRIP: 1.03 (ref 1–1.03)
SQUAMOUS #/AREA URNS HPF: ABNORMAL /HPF
TRIGL SERPL-MCNC: 89 MG/DL (ref 0–150)
UROBILINOGEN UR QL STRIP: ABNORMAL
VLDLC SERPL-MCNC: 18 MG/DL (ref 5–40)
WBC # UR STRIP: ABNORMAL /HPF
WBC NRBC COR # BLD AUTO: 7.49 10*3/MM3 (ref 3.4–10.8)
YEAST URNS QL MICRO: ABNORMAL /HPF

## 2025-05-01 PROCEDURE — 81001 URINALYSIS AUTO W/SCOPE: CPT | Performed by: INTERNAL MEDICINE

## 2025-05-01 PROCEDURE — 99233 SBSQ HOSP IP/OBS HIGH 50: CPT | Performed by: STUDENT IN AN ORGANIZED HEALTH CARE EDUCATION/TRAINING PROGRAM

## 2025-05-01 PROCEDURE — 85025 COMPLETE CBC W/AUTO DIFF WBC: CPT | Performed by: STUDENT IN AN ORGANIZED HEALTH CARE EDUCATION/TRAINING PROGRAM

## 2025-05-01 PROCEDURE — 99232 SBSQ HOSP IP/OBS MODERATE 35: CPT | Performed by: INTERNAL MEDICINE

## 2025-05-01 PROCEDURE — 83036 HEMOGLOBIN GLYCOSYLATED A1C: CPT

## 2025-05-01 PROCEDURE — 84100 ASSAY OF PHOSPHORUS: CPT | Performed by: STUDENT IN AN ORGANIZED HEALTH CARE EDUCATION/TRAINING PROGRAM

## 2025-05-01 PROCEDURE — 80053 COMPREHEN METABOLIC PANEL: CPT | Performed by: STUDENT IN AN ORGANIZED HEALTH CARE EDUCATION/TRAINING PROGRAM

## 2025-05-01 PROCEDURE — 82948 REAGENT STRIP/BLOOD GLUCOSE: CPT

## 2025-05-01 PROCEDURE — 92523 SPEECH SOUND LANG COMPREHEN: CPT

## 2025-05-01 PROCEDURE — 80061 LIPID PANEL: CPT

## 2025-05-01 PROCEDURE — 87086 URINE CULTURE/COLONY COUNT: CPT | Performed by: INTERNAL MEDICINE

## 2025-05-01 PROCEDURE — 25010000002 HYDROMORPHONE 1 MG/ML SOLUTION: Performed by: STUDENT IN AN ORGANIZED HEALTH CARE EDUCATION/TRAINING PROGRAM

## 2025-05-01 PROCEDURE — 83735 ASSAY OF MAGNESIUM: CPT | Performed by: STUDENT IN AN ORGANIZED HEALTH CARE EDUCATION/TRAINING PROGRAM

## 2025-05-01 RX ORDER — ALPRAZOLAM 0.5 MG
0.5 TABLET ORAL 3 TIMES DAILY PRN
Status: DISCONTINUED | OUTPATIENT
Start: 2025-05-01 | End: 2025-05-09 | Stop reason: HOSPADM

## 2025-05-01 RX ADMIN — CLOTRIMAZOLE AND BETAMETHASONE DIPROPIONATE: 10; .5 CREAM TOPICAL at 09:43

## 2025-05-01 RX ADMIN — Medication 10 ML: at 09:43

## 2025-05-01 RX ADMIN — ALLOPURINOL 100 MG: 100 TABLET ORAL at 21:11

## 2025-05-01 RX ADMIN — ATORVASTATIN CALCIUM 80 MG: 40 TABLET, FILM COATED ORAL at 21:11

## 2025-05-01 RX ADMIN — ROPINIROLE 1 MG: 1 TABLET, FILM COATED ORAL at 09:44

## 2025-05-01 RX ADMIN — BACLOFEN 10 MG: 10 TABLET ORAL at 09:44

## 2025-05-01 RX ADMIN — CLOTRIMAZOLE AND BETAMETHASONE DIPROPIONATE: 10; .5 CREAM TOPICAL at 21:11

## 2025-05-01 RX ADMIN — ROPINIROLE 1 MG: 1 TABLET, FILM COATED ORAL at 21:11

## 2025-05-01 RX ADMIN — HYDROMORPHONE HYDROCHLORIDE 1 MG: 1 INJECTION, SOLUTION INTRAMUSCULAR; INTRAVENOUS; SUBCUTANEOUS at 05:30

## 2025-05-01 RX ADMIN — CLOPIDOGREL BISULFATE 75 MG: 75 TABLET, FILM COATED ORAL at 09:44

## 2025-05-01 RX ADMIN — BUSPIRONE HYDROCHLORIDE 30 MG: 10 TABLET ORAL at 21:11

## 2025-05-01 RX ADMIN — BACLOFEN 10 MG: 10 TABLET ORAL at 16:14

## 2025-05-01 RX ADMIN — ROPINIROLE 1 MG: 1 TABLET, FILM COATED ORAL at 16:14

## 2025-05-01 RX ADMIN — GABAPENTIN 600 MG: 300 CAPSULE ORAL at 05:25

## 2025-05-01 RX ADMIN — BACLOFEN 10 MG: 10 TABLET ORAL at 21:11

## 2025-05-01 RX ADMIN — ALLOPURINOL 100 MG: 100 TABLET ORAL at 16:14

## 2025-05-01 RX ADMIN — Medication 10 ML: at 21:11

## 2025-05-01 RX ADMIN — GABAPENTIN 600 MG: 300 CAPSULE ORAL at 16:13

## 2025-05-01 RX ADMIN — PANTOPRAZOLE SODIUM 40 MG: 40 TABLET, DELAYED RELEASE ORAL at 05:25

## 2025-05-01 RX ADMIN — GABAPENTIN 600 MG: 300 CAPSULE ORAL at 21:11

## 2025-05-01 RX ADMIN — BUSPIRONE HYDROCHLORIDE 30 MG: 10 TABLET ORAL at 09:44

## 2025-05-01 NOTE — H&P
[facility] Medicine Services  HISTORY AND PHYSICAL    Patient Name: Phoebe Call  : 1955  MRN: [id number]  Primary Care Physician: Bang, No Known  Date of admission: 2025  Subjective   Subjective   Source of Information: Patient, ER signout, EMR    Chief Complaint:   Chief Complaint   Patient presents with    Extremity Weakness     HPI:  Phoebe Call is a 70 y.o. female with PMHx of HTN, DM 2, , RA, Hx of pancreatic cancer (), COPD, chronic dysphagia, remote CVA (, residual left-sided weakness), and chronic pain 2/2 injury with broken back s/p reconstruction and insertion of pain pump who presented for evaluation of right sided weakness/numbness and slurred speech. LTKW 3am on . Patient was woken up by caregiver this morning around 1030-11am and noted the patient was unable to hold a glass of water with her right hand and was significantly weak on the right-side, unable to ambulate. Furthermore, it was noted that the patient had some dysarthria.     Patient presented to the ER as a code stroke. NIHSS 10. There was a delay in obtaining CT imaging secondary to patient's pain and chronic kyphosis she required sedation to assist with lying flat on the scanner.  CTA head/neck is negative for flow-limiting stenosis or LVO; incidental LMCA aneurysm noted.     Review of systems:  Total 12 point review of systems is negative except as I have mentioned above    Personal History     Past Medical History:   Diagnosis Date    Diabetes     History of transfusion     [facility], no reaction    Hypertension     RA (rheumatoid arthritis)          Past Surgical History:   Procedure Laterality Date    BLADDER SUSPENSION      HERNIA REPAIR      HYSTERECTOMY      MASTOID SURGERY      PAIN PUMP INSERTION/REVISION N/A 2024    Procedure: INTRATHECAL CATHETER AND PAIN PUMP IMPLANT;  Surgeon: Cristi Egan MD;  Location: [facility];  Service: Pain Management;  Laterality:  N/A;     Family History: family history is not on file.     Social History:    Social History     Social History Narrative    Not on file       Medications:  Available home medication information reviewed.  ALPRAZolam, Mirabegron ER, Vibegron, albuterol sulfate HFA, allopurinol, amLODIPine, atenolol, baclofen, busPIRone, cephalexin, clotrimazole-betamethasone, diphenoxylate-atropine, erythromycin, gabapentin, glyburide, losartan, metFORMIN, omeprazole, promethazine, and rOPINIRole    Allergies   Allergen Reactions    Cymbalta [Duloxetine Hcl] Swelling     Blisters    Norco [Hydrocodone-Acetaminophen] Anaphylaxis    Amoxicillin-Pot Clavulanate Diarrhea     Pt gets a yeast infection.    Aspirin Hives    Ibuprofen Rash     Objective   Objective     Vital Signs:   Temp:  [98.4 °F (36.9 °C)] 98.4 °F (36.9 °C)  Heart Rate:  [] 87  Resp:  [18] 18  BP: (106-168)/(55-88) 145/55  Flow (L/min) (Oxygen Therapy):  [3] 3  Total (NIH Stroke Scale): 9  General:  Well nourished, ill appearing  Head:  Normocephalic, atraumatic  Eyes:  Sclerae appear normal. Pupils equally round  ENT:  Nares appear normal, no drainage. Moist oral mucosa  Neck:  No restricted ROM. Trachea midline  CV:  RRR. No M/R/G. No JVD  Lungs: CTAB. No wheezing, rhonchi, or rales. Symmetric expansion  Abdomen: Bowel sounds present. Nondistended, soft, nontender  Back:  Kyphotic  Extremities: No cyanosis or clubbing. No edema.  Skin:  No rashes and normal coloration. Warm and dry.  Neuro:  CN II-XII grossly intact. Sensation reduced on RUE/RLE. Mild dysarthria.  AAOx3. Reduced  strength in right hand  Psych:  Normal mood and affect    I have personally reviewed labs and tests showing:    LAB RESULTS:      Lab 04/30/25  1558 04/30/25  1555   WBC 7.95  --    HEMOGLOBIN 12.3  --    HEMOGLOBIN, POC  --  12.9   HEMATOCRIT 37.7  --    HEMATOCRIT POC  --  38   PLATELETS 257  --    NEUTROS ABS 4.40  --    IMMATURE GRANS (ABS) 0.03  --    LYMPHS ABS 2.61  --     MONOS ABS 0.58  --    EOS ABS 0.30  --    MCV 84.2  --    PROTIME 14.3  --    INR 1.05  --    APTT 37.9  --          Lab 04/30/25  1653 04/30/25  1555   SODIUM 138  --    POTASSIUM 4.0  --    CHLORIDE 99  --    CO2 28.0  --    ANION GAP 11.0  --    BUN 9  --    CREATININE 0.35* 0.40*   EGFR 111.5 108.0   GLUCOSE 127*  --    CALCIUM 9.9  --          Lab 04/30/25  1653   TOTAL PROTEIN 7.2   ALBUMIN 4.1   GLOBULIN 3.1   ALT (SGPT) 8   AST (SGOT) 28   BILIRUBIN 0.4   ALK PHOS 167*                     UA          4/9/2025    12:45   Urinalysis   Squamous Epithelial Cells, UA 0-2    Specific Gravity, UA 1.009    Ketones, UA Negative    Blood, UA Negative    Leukocytes, UA Trace    Nitrite, UA Negative    RBC, UA 0-2    WBC, UA 0-2    Bacteria, UA None Seen      Microbiology Results (last 10 days)       ** No results found for the last 240 hours. **          XR Chest 1 View  Result Date: 4/30/2025  XR CHEST 1 VW Date of Exam: 4/30/2025 4:52 PM EDT Indication: Acute Stroke Protocol (onset < 12 hrs) Comparison: CXR 10/15/2012 Findings: The heart is normal in size. The lungs are well-expanded. Subsegmental atelectasis and/or linear fibrosis is seen at the right lung base. No consolidation or mass is seen. The left lung apex is obscured by facial structures. Bony structures appear intact.     Impression: Impression: Subsegmental atelectasis and/are linear fibrosis is seen at the right lung base. Electronically Signed: Bj Champagne MD  4/30/2025 5:21 PM EDT  Workstation ID: [id number]    CT Angiogram Head w AI Analysis of LVO  Result Date: 4/30/2025  CT ANGIOGRAM HEAD W AI ANALYSIS OF LVO, CT ANGIOGRAM NECK Date of Exam: 4/30/2025 4:28 PM EDT Indication: Neuro Deficit, acute, Stroke suspected Neuro deficit, acute stroke suspected. Comparison: None available. Technique: CTA of the head and neck was performed after the uneventful intravenous administration of 90 mL Isovue-370. Reconstructed coronal and sagittal images were also  obtained. In addition, a 3-D volume rendered image was created for interpretation. Automated exposure control and iterative reconstruction methods were used. Findings: The lung apices are clear. Evaluation of the neck soft tissues demonstrates no evidence of aerodigestive tract mass or pathologic cervical lymphadenopathy. Multilevel cervical spondylosis is present, without evidence of acute fracture or aggressive osseous lesion. Patent aortic arch with typical three-vessel branching. The subclavian arteries are patent bilaterally. Some calcific atherosclerotic change is present at the right ICA origin with mild, less than 50% stenosis present by NASCET criteria. Similar mild narrowing is present on the left. The vertebral arteries are normal in course and caliber bilaterally. Intracranially, the carotid siphons demonstrate calcific atherosclerotic changes with some mild to moderate narrowing of the cavernous and  ophthalmic segments. The anterior cerebral arteries appear patent. The right and left middle cerebral arteries demonstrate no evidence of high-grade stenosis or occlusion. There is a saccular aneurysm present involving the left MCA bifurcation, inferiorly projecting and measuring 3 mm at the neck, 5 mm in greatest transverse dimension and 7 mm neck to dome. The vertebrobasilar system is patent. The posterior cerebral arteries are normal in course and caliber bilaterally.     Impression: Impression: Mild cervical and intracranial atherosclerotic changes are present as above, without evidence of associated high-grade stenosis or large vessel occlusion. 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 Electronically Signed: Barney Valle MD  4/30/2025 4:51 PM EDT  Workstation ID: [id number]    CT Angiogram Neck  Result Date: 4/30/2025  CT ANGIOGRAM HEAD W AI ANALYSIS OF LVO, CT ANGIOGRAM  NECK Date of Exam: 4/30/2025 4:28 PM EDT Indication: Neuro Deficit, acute, Stroke suspected Neuro deficit, acute stroke suspected. Comparison: None available. Technique: CTA of the head and neck was performed after the uneventful intravenous administration of 90 mL Isovue-370. Reconstructed coronal and sagittal images were also obtained. In addition, a 3-D volume rendered image was created for interpretation. Automated exposure control and iterative reconstruction methods were used. Findings: The lung apices are clear. Evaluation of the neck soft tissues demonstrates no evidence of aerodigestive tract mass or pathologic cervical lymphadenopathy. Multilevel cervical spondylosis is present, without evidence of acute fracture or aggressive osseous lesion. Patent aortic arch with typical three-vessel branching. The subclavian arteries are patent bilaterally. Some calcific atherosclerotic change is present at the right ICA origin with mild, less than 50% stenosis present by NASCET criteria. Similar mild narrowing is present on the left. The vertebral arteries are normal in course and caliber bilaterally. Intracranially, the carotid siphons demonstrate calcific atherosclerotic changes with some mild to moderate narrowing of the cavernous and  ophthalmic segments. The anterior cerebral arteries appear patent. The right and left middle cerebral arteries demonstrate no evidence of high-grade stenosis or occlusion. There is a saccular aneurysm present involving the left MCA bifurcation, inferiorly projecting and measuring 3 mm at the neck, 5 mm in greatest transverse dimension and 7 mm neck to dome. The vertebrobasilar system is patent. The posterior cerebral arteries are normal in course and caliber bilaterally.     Impression: Impression: Mild cervical and intracranial atherosclerotic changes are present as above, without evidence of associated high-grade stenosis or large vessel occlusion. Incidentally noted inferiorly  directed saccular aneurysm of the left MCA bifurcation measuring 3 mm at the neck, 5 mm in greatest transverse dimension and 7 mm neck to dome. Electronically Signed: Barney Valle MD  4/30/2025 4:51 PM EDT  Workstation ID: [id number]    CT Head Without Contrast Stroke Protocol  Result Date: 4/30/2025  CT HEAD WO CONTRAST STROKE PROTOCOL Date of Exam: 4/30/2025 3:48 PM EDT Indication: Neuro deficit, acute, stroke suspected Neuro Deficit, acute, Stroke suspected. Right arm weakness Comparison: None available. Technique: Axial CT images were obtained of the head without contrast administration.  Reconstructed coronal images were also obtained. Automated exposure control and iterative construction methods were used. Scan Time: 1619 hours Results discussed with Viktoria at 1645 hours. Findings: The ventricles are mildly and diffusely prominent. Sulci and cerebellar folia are not abnormally prominent. Ill-defined diminished density in cerebral white matter is consistent with mild gliosis and/or scattered old lacunar infarcts. No diminished density is seen in immediate periventricular white matter to suggest transependymal CSF. There is no CT evidence of acute intracranial hemorrhage, mass, or mass effect. No abnormality of the orbits is evident. The paranasal sinuses are well aerated. Mild mucosal thickening is seen in the left maxillary antrum. Nasal antral windows are widely patent. The middle ears are well aerated. Mastoidectomy defect on the left is well aerated.     Impression: Impression: CT scan of the head without IV contrast demonstrating mild prominence of the ventricles which may be due to atrophy or mild ventricular hypertension. Mild white matter changes consistent with gliosis and/or scattered old lacunar infarcts. No acute intracranial abnormality is seen. Electronically Signed: Bj Champagne MD  4/30/2025 4:48 PM EDT  Workstation ID: [id number]        My personal interpretation of the CXR is no acute  cardiopulmonary process    My personal interpretation of the EKG is     This case was discussed with the ER attending physician.    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   Left MCA aneurysm 3mm x 5mm x 7mm; incidental  - Right sided weakness/numbness and dysarthria, etiology suspected left hemispheric stroke  - MRI brain cannot be obtained 2/2 kyposis and pain pump, consider repeat CT head in AM  - Echo  - Frequent neurochecks, maintain euvolemia/euglycemia  - Check A1c and lipid panel  - Meds: Plavix 75mg for now, patient cannot have ASA 2/2 hives  - PT/OT/SLP evaluation  - Will need to follow-up with neurosurgery outpatient for incidental aneurysm   - Neurology following     Essential hypertension  - Permissive HTN  - Nicardipine as needed for SBP >220     HLD  - Chronic,   - Lipid panel pending  - C/w Atorvastatin 80mg QHS     Diabetes Mellitus type 2  - Chronic with hyperglycemia  - Pending a1c  - ISS and accuchecks for now    COPD  - Chronic, not in exaberbation  - Duonebs PRN    Severe LBP  Hx of surgical reconstruction, pain pump implantation  - Chronic  - Pain control PRN    VTE PPx: SCDs  Diet: Diet: Cardiac, Diabetic; Healthy Heart (2-3 Na+); Consistent Carbohydrate; Fluid Consistency: Thin (IDDSI 0)  CODE STATUS:    Code Status and Medical Interventions: CPR (Attempt to Resuscitate); Full Support   Ordered at: 04/30/25 2036     Code Status (Patient has no pulse and is not breathing):    CPR (Attempt to Resuscitate)     Medical Interventions (Patient has pulse or is breathing):    Full Support     Level Of Support Discussed With:     Patient       Expected Discharge TBD  Expected discharge date/ time has not been documented.     James Hatch,   04/30/25

## 2025-05-01 NOTE — PROGRESS NOTES
Stroke Progress Note       Chief Complaint: Right-sided weakness and confusion    Subjective    Subjective     Subjective:  The patient is lying down in the bed in NAD.  No family were at the bedside. The patient stated that she is doing better this morning she is complaining about lower back pain.  She stated that her right side is being weaker since yesterday.  Denies having any new stroke or strokelike symptoms.  All patient's questions and concerns were answered.    No other acute complains at this time    Review of Systems   Constitutional: No fatigue      Objective      Temp:  [98 °F (36.7 °C)-98.4 °F (36.9 °C)] 98.2 °F (36.8 °C)  Heart Rate:  [] 91  Resp:  [16-18] 17  BP: (106-168)/(52-88) 116/52    Objective    GEN: lying in bed; in NAD  HENT: normocephalic, non-erythematous oropharynx    NEURO: The patient is kyphotic  Mental Status: A&O x 2, interactive, able to follow commands  Speech: Intact Articulation  CN 2-12:  II - PERRLA  III, IV, VI - EOMI  VII -no gross facial asymmetry  XII - Tongue protrudes midline    Motor: The patient can move bilateral upper extremity slightly against gravity and she can move bilateral lower extremity with gravity slightly with the right upper and lower extremity being weaker than the left side  Sensory: intact light touch throughout  Reflexes: negative Overton's sign BL  Coordination: no ataxia with finger-to-nose testing  Gait/Station: deferred     Results Review:    I reviewed the patient's new clinical results.    WBC   Date Value Ref Range Status   05/01/2025 7.49 3.40 - 10.80 10*3/mm3 Final     RBC   Date Value Ref Range Status   05/01/2025 4.27 3.77 - 5.28 10*6/mm3 Final     Hemoglobin   Date Value Ref Range Status   05/01/2025 11.6 (L) 12.0 - 15.9 g/dL Final     Hematocrit   Date Value Ref Range Status   05/01/2025 38.3 34.0 - 46.6 % Final     MCV   Date Value Ref Range Status   05/01/2025 89.7 79.0 - 97.0 fL Final     MCH   Date Value Ref Range Status    05/01/2025 27.2 26.6 - 33.0 pg Final     MCHC   Date Value Ref Range Status   05/01/2025 30.3 (L) 31.5 - 35.7 g/dL Final     RDW   Date Value Ref Range Status   05/01/2025 16.1 (H) 12.3 - 15.4 % Final     RDW-SD   Date Value Ref Range Status   05/01/2025 53.1 37.0 - 54.0 fl Final     MPV   Date Value Ref Range Status   05/01/2025 10.5 6.0 - 12.0 fL Final     Platelets   Date Value Ref Range Status   05/01/2025 222 140 - 450 10*3/mm3 Final     Neutrophil %   Date Value Ref Range Status   05/01/2025 50.3 42.7 - 76.0 % Final     Lymphocyte %   Date Value Ref Range Status   05/01/2025 35.6 19.6 - 45.3 % Final     Monocyte %   Date Value Ref Range Status   05/01/2025 9.2 5.0 - 12.0 % Final     Eosinophil %   Date Value Ref Range Status   05/01/2025 4.1 0.3 - 6.2 % Final     Basophil %   Date Value Ref Range Status   05/01/2025 0.5 0.0 - 1.5 % Final     Immature Grans %   Date Value Ref Range Status   05/01/2025 0.3 0.0 - 0.5 % Final     Neutrophils, Absolute   Date Value Ref Range Status   05/01/2025 3.76 1.70 - 7.00 10*3/mm3 Final     Lymphocytes, Absolute   Date Value Ref Range Status   05/01/2025 2.67 0.70 - 3.10 10*3/mm3 Final     Monocytes, Absolute   Date Value Ref Range Status   05/01/2025 0.69 0.10 - 0.90 10*3/mm3 Final     Eosinophils, Absolute   Date Value Ref Range Status   05/01/2025 0.31 0.00 - 0.40 10*3/mm3 Final     Basophils, Absolute   Date Value Ref Range Status   05/01/2025 0.04 0.00 - 0.20 10*3/mm3 Final     Immature Grans, Absolute   Date Value Ref Range Status   05/01/2025 0.02 0.00 - 0.05 10*3/mm3 Final     nRBC   Date Value Ref Range Status   05/01/2025 0.0 0.0 - 0.2 /100 WBC Final       Lab Results   Component Value Date    GLUCOSE 113 (H) 05/01/2025    BUN 11 05/01/2025    CREATININE 0.37 (L) 05/01/2025     05/01/2025    K 4.5 05/01/2025     05/01/2025    CALCIUM 9.7 05/01/2025    PROTEINTOT 6.6 05/01/2025    ALBUMIN 4.0 05/01/2025    ALT 14 05/01/2025    AST 22 05/01/2025     ALKPHOS 160 (H) 05/01/2025    BILITOT 0.5 05/01/2025    GLOB 2.6 05/01/2025    AGRATIO 1.5 05/01/2025    BCR 29.7 (H) 05/01/2025    ANIONGAP 11.0 05/01/2025    EGFR 110.0 05/01/2025     XR Chest 1 View  Result Date: 4/30/2025  Impression: Subsegmental atelectasis and/are linear fibrosis is seen at the right lung base. Electronically Signed: Bj Champagne MD  4/30/2025 5:21 PM EDT  Workstation ID: [id number]    CT Angiogram Head w AI Analysis of LVO  Result Date: 4/30/2025  Impression: Mild cervical and intracranial atherosclerotic changes are present as above, without evidence of associated high-grade stenosis or large vessel occlusion. 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 Electronically Signed: Barney Valle MD  4/30/2025 4:51 PM EDT  Workstation ID: [id number]    CT Angiogram Neck  Result Date: 4/30/2025  Impression: Mild cervical and intracranial atherosclerotic changes are present as above, without evidence of associated high-grade stenosis or large vessel occlusion. 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 Electronically Signed: Barney Valle MD  4/30/2025 4:51 PM EDT  Workstation ID: [id number]    CT Head Without Contrast Stroke Protocol  Result Date: 4/30/2025  Impression: CT scan of the head without IV contrast demonstrating mild prominence of the ventricles which may be due to atrophy or mild ventricular hypertension. Mild white matter changes consistent with gliosis and/or scattered old lacunar infarcts. No acute intracranial abnormality is seen. Electronically Signed: Bj Champagne MD  4/30/2025 4:48 PM EDT  Workstation ID: [id number]    -CTH wo on 4/30/2025 images were personally reviewed and showed no acute ischemic or hemorrhagic  stroke  -CTA of the head and neck images from 4/30/2025 were personally reviewed and showed no flow limiting stenosis or LVO.  There is saccular aneurysm of the left MCA bifurcation  -MRI brain images is pending  -Transthoracic echocardiogram is pending  -A1c from 5/1/2025 was 7.2%  -LDL from 5/1/2025 was 46        Assessment/Plan     This is a 70-year-old female with known medical diagnoses of essential hypertension, diabetes mellitus type 2, rheumatoid arthritis, history of pancreatic cancer (2011), COPD, chronic dysphagia, remote stroke (1990s, residual left-sided weakness), and chronic pain 2/2 injury with broken back s/p reconstruction and chronic kyphosis with insertion of pain pump presents to the emergency department via EMS for further evaluation of right-sided weakness, numbness, and dysarthria.  Patient's LKW was 0300 this morning therefore she is not a candidate for IV thrombolytic therapy.  There was a delay in obtaining CT imaging secondary to patient's pain and chronic kyphosis she required sedation to assist with lying flat on the scanner.  CTA head/neck is negative for flow-limiting stenosis or LVO; incidental LMCA aneurysm noted.  Patient will require admission to the hospitalist service for further evaluation.     Antiplatelet PTA: None  Anticoagulant PTA: None               #Right sided weakness/numbness and dysarthria, etiology suspected left hemispheric stroke  #History of right hemispheric stroke with residual left sided weakness   #Left MCA aneurysm 3mm x 5mm x 7mm; incidental  -Etiology of patient's symptoms likely acute encephalopathy versus less likely vascular nature such as TIA or small stroke  -CT wo on 4/30/2025 images were personally reviewed and showed no acute ischemic or hemorrhagic stroke  -CTA of the head and neck images from 4/30/2025 were personally reviewed and showed no flow limiting stenosis or LVO.  There is saccular aneurysm of the left MCA bifurcation  -MRI brain images  is pending  -Transthoracic echocardiogram is pending  -A1c from 5/1/2025 was 7.2%  -LDL from 5/1/2025 was 46    Recommendations  -MRI brain cannot be obtained 2/2 kyposis and pain pump, consider repeat CT head   -Transthoracic echocardiogram is pending  -A1c and lipid panel in AM  -Meds: Continue Plavix 75 mg daily for secondary stroke prevention.  -Consider discontinuing the outpatient omeprazole as this with intergroup with Plavix to minimize its effect  -Consider reducing atorvastatin dose to 40 mg daily for secondary stroke prevention.  Target LDL level of less than 55  -Activity as tolerated, fall risk precautions  -PT/OT/SLP evaluation  -Will need to follow-up with neurosurgery outpatient for incidental aneurysm      #Essential hypertension  - Blood pressure goals of normotension     #Hyperlipidemia  -Atorvastatin 40 mg nightly     #Diabetes Mellitus type 2  -Maintain euglycemia  -Management per primary team      Stroke will continue to follow. Please call for any further questions or concerns  =================================  Sid Angel MD, Msc, PhD  Vascular Neurologist  [facility]

## 2025-05-01 NOTE — PLAN OF CARE
Goal Outcome Evaluation:  Plan of Care Reviewed With: patient                Anticipated Discharge Disposition (SLP): skilled nursing facility    SLP Diagnosis: moderate, cognitive-linguistic disorder, mild, dysarthria (05/01/25 1030)

## 2025-05-01 NOTE — THERAPY EVALUATION
Acute Care - Speech Language Pathology Initial Evaluation  [facility]  Cognitive-Communication Evaluation     Patient Name: Phoebe Call  : 1955  MRN: [id number]  Today's Date: 2025               Admit Date: 2025     Visit Dx:    ICD-10-CM ICD-9-CM   1. Acute right-sided weakness  R53.1 728.87   2. Right sided numbness  R20.0 782.0   3. Dysarthria  R47.1 784.51   4. History of CVA (cerebrovascular accident)  Z86.73 V12.54   5. History of rheumatoid arthritis  Z87.39 V13.4   6. History of diabetes mellitus  Z86.39 V12.29   7. History of hypertension  Z86.79 V12.59   8. Cognitive communication deficit  R41.841 799.52     Patient Active Problem List   Diagnosis    Stroke-like symptoms    Benign essential hypertension    Chronic obstructive pulmonary disease    Chronic pain disorder    Diabetic peripheral neuropathy associated with type 2 diabetes mellitus    Gastroesophageal reflux disease without esophagitis    Hyperlipidemia    Hypothyroidism    CVA (cerebral vascular accident)     Past Medical History:   Diagnosis Date    Diabetes     History of transfusion     [facility], no reaction    Hypertension     RA (rheumatoid arthritis)      Past Surgical History:   Procedure Laterality Date    BLADDER SUSPENSION      HERNIA REPAIR      HYSTERECTOMY      MASTOID SURGERY      PAIN PUMP INSERTION/REVISION N/A 2024    Procedure: INTRATHECAL CATHETER AND PAIN PUMP IMPLANT;  Surgeon: Cristi Egan MD;  Location: [facility];  Service: Pain Management;  Laterality: N/A;       SLP Recommendation and Plan  SLP Diagnosis: moderate, cognitive-linguistic disorder, mild, dysarthria (25)              SLC Criteria for Skilled Therapy Interventions Met: yes (25)  Anticipated Discharge Disposition (SLP): skilled nursing facility (25)        Therapy Frequency (SLP SLC): 5 days per week (25)  Predicted Duration Therapy Intervention (Days): 1 week (25)                                     SLP EVALUATION (Last 72 Hours)       SLP SLC Evaluation       Row Name 05/01/25 1039                   Communication Assessment/Intervention    Document Type evaluation  -SM        Subjective Information no complaints  -SM        Patient Observations alert;cooperative  -SM        Patient Effort good  -SM           General Information    Patient Profile Reviewed yes  -SM        Pertinent History Of Current Problem Adm R weakness and slurred speech. CT acute, further w/u pending. Passed nursing dysphagia screen. 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  -SM        Prior Level of Function-Communication unknown  -SM        Plans/Goals Discussed with patient;agreed upon  -        Barriers to Rehab cognitive status  -        Patient's Goals for Discharge patient did not state  -SM           Pain    Additional Documentation Pain Scale: FACES Pre/Post-Treatment (Group)  -SM           Pain Scale: FACES Pre/Post-Treatment    Pain: FACES Scale, Pretreatment 4-->hurts little more  -SM        Posttreatment Pain Rating 4-->hurts little more  -SM        Pre/Posttreatment Pain Comment c/o back pain but distracted from topic when attempted number rating. RN notified  -           Comprehension Assessment/Intervention    Comprehension Assessment/Intervention Auditory Comprehension  -           Auditory Comprehension Assessment/Intervention    Answers Questions (Communication) yes/no;wh questions;personal;simple;mild impairment;moderate impairment  -        Able to Follow Commands (Communication) 1-step;mild impairment;moderate impairment  -        Narrative Discourse conversational level;mild impairment;moderate impairment  -SM        Successful Auditory Strategies (Communication) repetition;decrease environmental distractions  -           Expression Assessment/Intervention    Expression Assessment/Intervention verbal expression  -            Verbal Expression Assessment/Intervention    Conversational Discourse/Fluency WFL  -SM           Motor Speech Assessment/Intervention    Motor Speech Function mild impairment  -SM        Characteristics Consistent with Dysarthria decreased intensity  -SM        Speech intelligibility 90%;in quiet environment;in connected speech;with unfamiliar listener  -SM           Cognitive Assessment Intervention- SLP    Orientation Status (Cognition) person;WFL;place;mild impairment  -SM        Attention (Cognitive) sustained;moderate impairment  -SM        Problem Solving (Cognitive) simple;mild impairment  -SM        Executive Function (Cognition) deficit awareness;judgement;moderate impairment  -SM        Cognition, Comment Attention/focus impacting complete assessment. Pt fixated on breakfast tray. SLP assisted in warming and setting tray. Would benefit from feeding assistance though pt denied needing such. Baseline cognitive-communication skills unknown. Hx chronic dysphagia documented. Observed with breakfast tray, no signs difficulty observed.  -           SLP Evaluation Clinical Impressions    SLP Diagnosis moderate;cognitive-linguistic disorder;mild;dysarthria  -        Rehab Potential/Prognosis good  -        SLC Criteria for Skilled Therapy Interventions Met yes  -SM        Functional Impact difficulty in expressing complex messages;difficulty completing home management task  -           Recommendations    Therapy Frequency (SLP SLC) 5 days per week  -SM        Predicted Duration Therapy Intervention (Days) 1 week  -        Anticipated Discharge Disposition (SLP) skilled nursing facility  -                  User Key  (r) = Recorded By, (t) = Taken By, (c) = Cosigned By      Initials Name Effective Dates    Chloe Gonzales MS CCC-SLP 01/20/25 -                        EDUCATION  The patient has been educated in the following areas:     Cognitive Impairment Communication Impairment.           SLP  "GOALS       Row Name 05/01/25 1030             Patient will demonstrate functional cognitive-linguistic skills for return to discharge environment    [place] with minimal cues  -      Time frame 1 week  -         SLP Diagnostic Treatment     Patient will participate in further assessment in the following areas clarification of baseline cognitive communication status;reading comprehension;graphic expression  -         Comprehend Questions Goal 1 (SLP)    Improve Ability to Comprehend Questions Goal 1 (SLP) simple wh questions;simple general questions;100%;independently (over 90% accuracy)  -      Time Frame (Comprehend Questions Goal 1, SLP) 1 week  -         Attention Goal 1 (SLP)    Improve Attention by Goal 1 (SLP) complete sustained attention task;100%;independently (over 90% accuracy)  -      Time Frame (Attention Goal 1, SLP) 1 week  -         Orientation Goal 1 (SLP)    Improve Orientation Through Goal 1 (SLP) demonstrating orientation to month;demonstrating orientation to year;demonstrating orientation to place;demonstrating orientation to disease/impairment;100%;independently (over 90% accuracy)  -      Time Frame (Orientation Goal 1, SLP) 1 week  -         Functional Problem Solving Skills Goal 1 (SLP)    Improve Problem Solving Through Goal 1 (SLP) determine solutions to simple ADL/safety problems;answer \"what if\" questions;100%;independently (over 90% accuracy)  -      Time Frame (Problem Solving Goal 1, SLP) 1 week  -                User Key  (r) = Recorded By, (t) = Taken By, (c) = Cosigned By      Initials Name Provider Type    Chloe Gonzales MS CCC-SLP Speech and Language Pathologist                              Time Calculation:      Time Calculation- SLP       Row Name 05/01/25 2560             Time Calculation- SLP    SLP Start Time 1000  -      SLP Received On 05/01/25  -         Untimed Charges    47972-TQ Eval Speech and Production w/ Language Minutes 40  " -SM         Total Minutes    Untimed Charges Total Minutes 40  -SM       Total Minutes 40  -SM                User Key  (r) = Recorded By, (t) = Taken By, (c) = Cosigned By      Initials Name Provider Type    Chloe Gonzales MS CCC-SLP Speech and Language Pathologist                    Therapy Charges for Today       Code Description Service Date Service Provider Modifiers Qty    [id number] HC ST EVAL SPEECH AND PROD W LANG  3 5/1/2025 Chloe Garcia MS CCC-SLP GN 1                       Chloe Garcia MS CCC-SLP  5/1/2025

## 2025-05-01 NOTE — ED NOTES
Phoebe Call    Nursing Report ED to Floor:  Mental status: A&O X 4   Ambulatory status: BEDREST   Oxygen Therapy:  3L NC   Cardiac Rhythm: NORMAL SINUS   Admitted from: ED  Safety Concerns:  NONE   Precautions: NONE   Social Issues: NONE   ED Room #:  11    ED Nurse Phone Extension - [phone] or may call [phone].      HPI:   Chief Complaint   Patient presents with    Extremity Weakness       Past Medical History:  Past Medical History:   Diagnosis Date    Diabetes     History of transfusion     [place], no reaction    Hypertension     RA (rheumatoid arthritis)         Past Surgical History:  Past Surgical History:   Procedure Laterality Date    BLADDER SUSPENSION      HERNIA REPAIR      HYSTERECTOMY      MASTOID SURGERY      PAIN PUMP INSERTION/REVISION N/A 6/21/2024    Procedure: INTRATHECAL CATHETER AND PAIN PUMP IMPLANT;  Surgeon: Cristi Egan MD;  Location: [facility];  Service: Pain Management;  Laterality: N/A;        Admitting Doctor:   Celena Pozo MD    Consulting Provider(s):  Consults       Date and Time Order Name Status Description    4/30/2025  3:43 PM Inpatient Neurology Consult Stroke Completed              Admitting Diagnosis:   The primary encounter diagnosis was Acute right-sided weakness. Diagnoses of Right sided numbness, Dysarthria, History of CVA (cerebrovascular accident), History of rheumatoid arthritis, History of diabetes mellitus, and History of hypertension were also pertinent to this visit.    Most Recent Vitals:   Vitals:    04/30/25 1900 04/30/25 1915 04/30/25 1930 04/30/25 2000   BP: 144/64 145/63 142/60 151/58   Pulse: 80 81 82 87   Resp:       Temp:       TempSrc:       SpO2: 100% 100% 100% 95%   Weight:       Height:           Active LDAs/IV Access:   Lines, Drains & Airways       Active LDAs       Name Placement date Placement time Site Days    Peripheral IV 04/30/25 1559 20 G Left Antecubital 04/30/25  1559  Antecubital  less than 1                     Labs (abnormal labs have a star):   Labs Reviewed   CBC WITH AUTO DIFFERENTIAL - Abnormal; Notable for the following components:       Result Value    RDW 15.7 (*)     All other components within normal limits   COMPREHENSIVE METABOLIC PANEL - Abnormal; Notable for the following components:    Glucose 127 (*)     Creatinine 0.35 (*)     Alkaline Phosphatase 167 (*)     BUN/Creatinine Ratio 25.7 (*)     All other components within normal limits    Narrative:     GFR Categories in Chronic Kidney Disease (CKD)              GFR Category          GFR (mL/min/1.73)    Interpretation  G1                    90 or greater        Normal or high (1)  G2                    60-89                Mild decrease (1)  G3a                   45-59                Mild to moderate decrease  G3b                   30-44                Moderate to severe decrease  G4                    15-29                Severe decrease  G5                    14 or less           Kidney failure    (1)In the absence of evidence of kidney disease, neither GFR category G1 or G2 fulfill the criteria for CKD.    eGFR calculation 2021 CKD-EPI creatinine equation, which does not include race as a factor   POCT CHEM 8 - Abnormal; Notable for the following components:    Creatinine 0.40 (*)     Sodium 134 (*)     POC Potassium 5.8 (*)     Total CO2 30 (*)     Ionized Calcium 1.13 (*)     All other components within normal limits   PROTIME-INR - Normal   APTT - Normal    Narrative:     PTT = The equivalent PTT values for the therapeutic range of heparin levels at 0.3 to 0.5 U/ml are 60 to 70 seconds.   RAINBOW DRAW    Narrative:     The following orders were created for panel order [place] Draw.  Procedure                               Abnormality         Status                     ---------                               -----------         ------                     Green Top (Gel)[[id number]]                                  Final result                Lavender Top[[id number]]                                     Final result               Gold Top - SST[[id number]]                                   Final result               Zimmerman Top[[id number]]                                         Final result               Light Blue Top[[id number]]                                   Final result                 Please view results for these tests on the individual orders.   CBC AND DIFFERENTIAL    Narrative:     The following orders were created for panel order CBC & Differential.  Procedure                               Abnormality         Status                     ---------                               -----------         ------                     CBC Auto Differential[[id number]]        Abnormal            Final result                 Please view results for these tests on the individual orders.   GREEN TOP   LAVENDER TOP   GOLD TOP - SST   GRAY TOP   LIGHT BLUE TOP       Meds Given in ED:   Medications   sodium chloride 0.9 % flush 10 mL (has no administration in time range)   clopidogrel (PLAVIX) tablet 75 mg (75 mg Oral Given 4/30/25 1744)   HYDROmorphone (DILAUDID) injection 1 mg (1 mg Intravenous Given 4/30/25 1611)   midazolam (VERSED) injection 1 mg (1 mg Intravenous Given 4/30/25 1613)   ketamine hcl syringe solution prefilled syringe (50 mg Intravenous Given 4/30/25 1616)   iopamidol (ISOVUE-370) 76 % injection 75 mL (75 mL Intravenous Given 4/30/25 1639)           Last NIH score:  Interval: baseline  1a. Level of Consciousness: 0-->Alert, keenly responsive  1b. LOC Questions: 0-->Answers both questions correctly  1c. LOC Commands: 0-->Performs both tasks correctly  2. Best Gaze: 0-->Normal  3. Visual: 0-->No visual loss  4. Facial Palsy: 0-->Normal symmetrical movements  5a. Motor Arm, Left: 0-->No drift, limb holds 90 (or 45) degrees for full 10 secs  5b. Motor Arm, Right: 1-->Drift, limb holds 90 (or 45) degrees, but drifts down before full 10 secs, does not  hit bed or other support  6a. Motor Leg, Left: 3-->No effort against gravity, leg falls to bed immediately  6b. Motor Leg, Right: 3-->No effort against gravity, leg falls to bed immediately  7. Limb Ataxia: 0-->Absent  8. Sensory: 1-->Mild-to-moderate sensory loss, patient feels pinprick is less sharp or is dull on the affected side, or there is a loss of superficial pain with pinprick, but patient is aware of being touched  9. Best Language: 0-->No aphasia, normal  10. Dysarthria: 1-->Mild-to-moderate dysarthria, patient slurs at least some words and, at worst, can be understood with some difficulty  11. Extinction and Inattention (formerly Neglect): 0-->No abnormality    Total (NIH Stroke Scale): 9     Dysphagia screening results:  Patient Factors Component (Dysphagia:Stroke or Rule-out)  Best Eye Response: 4-->(E4) spontaneous (04/30/25 1740)  Best Motor Response: 6-->(M6) obeys commands (04/30/25 1740)  Best Verbal Response: 5-->(V5) oriented (04/30/25 1740)  Maryellen Coma Scale Score: 15 (04/30/25 1740)  Is there Facial Asymmetry/Weakness?: No (04/30/25 1740)  Is there Tongue Asymmetry/Weakness?: No (04/30/25 1740)  Is there Palatal Asymmetry/Weakness?: No (04/30/25 1740)  Patient Assessment Result: Pass - Proceed to Water Test (04/30/25 1740)     Maryellen Coma Scale:  No data recorded     CIWA:        Restraint Type:            Isolation Status:  No active isolations

## 2025-05-01 NOTE — CASE MANAGEMENT/SOCIAL WORK
Discharge Planning Assessment  [facility]     Patient Name: Phoebe Call  MRN: [id number]  Today's Date: 5/1/2025    Admit Date: 4/30/2025    Plan: Home with family   Discharge Needs Assessment       Row Name 05/01/25 0815       Living Environment    People in Home alone    Current Living Arrangements apartment    Potentially Unsafe Housing Conditions none    In the past 12 months has the electric, gas, oil, or water company threatened to shut off services in your home? No    Primary Care Provided by self    Provides Primary Care For no one    Family Caregiver if Needed sibling(s)    Family Caregiver Names Marilou (sister in law) [phone]    Able to Return to Prior Arrangements yes       Resource/Environmental Concerns    Resource/Environmental Concerns none    Transportation Concerns none       Transportation Needs    In the past 12 months, has lack of transportation kept you from medical appointments or from getting medications? no    In the past 12 months, has lack of transportation kept you from meetings, work, or from getting things needed for daily living? No       Food Insecurity    Within the past 12 months, you worried that your food would run out before you got the money to buy more. Never true    Within the past 12 months, the food you bought just didn't last and you didn't have money to get more. Never true       Transition Planning    Patient/Family Anticipates Transition to home with family    Patient/Family Anticipated Services at Transition none    Transportation Anticipated family or friend will provide       Discharge Needs Assessment    Readmission Within the Last 30 Days no previous admission in last 30 days    Equipment Currently Used at Home none    Concerns to be Addressed denies needs/concerns at this time    Do you want help finding or keeping work or a job? I do not need or want help    Do you want help with school or training? For example, starting or completing job training or getting  a high school diploma, GED or equivalent No    Anticipated Changes Related to Illness none    Equipment Needed After Discharge none                   Discharge Plan       Row Name 05/01/25 0817       Plan    Plan Home with family    Patient/Family in Agreement with Plan yes    Plan Comments Spoke with patient at bedside. Lives alone in [place]. Contact is Marilou (sister in law) [phone]. Is independent with ADL's. No problems [employer] Medicare/[place] Medicare or medications. Uses [facility]. Uses a rolling walker. No PCP. Plan is home with sister in law. Sister in law will transport. CM will continue to follow.    Final Discharge Disposition Code 01 - home or self-care                       Demographic Summary       Row Name 05/01/25 0813       General Information    Arrived From emergency department    Referral Source admission list    Reason for Consult discharge planning    Preferred Language English       Contact Information    Permission Granted to Share Info With     Contact Information Obtained for                    Functional Status       Row Name 05/01/25 0814       Functional Status    Usual Activity Tolerance moderate    Current Activity Tolerance moderate       Physical Activity    On average, how many days per week do you engage in moderate to strenuous exercise (like a brisk walk)? 0 days    On average, how many minutes do you engage in exercise at this level? 0 min    Number of minutes of exercise per week 0       Functional Status, IADL    Medications independent    Meal Preparation independent    Housekeeping independent    Laundry independent    Shopping independent       Mental Status    General Appearance WDL WDL       Mental Status Summary    Recent Changes in Mental Status/Cognitive Functioning no changes       Employment/    Employment Status retired                   Psychosocial    No documentation.                  Abuse/Neglect    No documentation.                   Legal    No documentation.                  Substance Abuse    No documentation.                  Patient Forms    No documentation.                     James Vasquez RN

## 2025-05-01 NOTE — PROGRESS NOTES
[facility] Medicine Services  PROGRESS NOTE    Patient Name: Phoebe Call  : 1955  MRN: [id number]    Date of Admission: 2025  Primary Care Physician: Provider, No Known    Subjective   Subjective     CC:  Possible CVA    HPI:  Lethargic, laying on her side and difficult to get her to set up and try to eat breakfast.  Complaining of severe back pain      Objective   Objective     Vital Signs:   Temp:  [98 °F (36.7 °C)-98.4 °F (36.9 °C)] 98.2 °F (36.8 °C)  Heart Rate:  [] 80  Resp:  [16-18] 17  BP: (106-168)/(52-88) 116/52  Flow (L/min) (Oxygen Therapy):  [3] 3     Physical Exam:  Constitutional: Sleepy, difficult to awaken, slumped over in bed because she says this position is comfortable for her back  HENT: NCAT, mucous membranes moist  Respiratory: Respiratory effort normal   Cardiovascular: RRR, no murmurs, rubs, or gallops  Gastrointestinal: soft, nontender, nondistended  Musculoskeletal: Trace LE edema  Psychiatric: Lethargic but cooperative  Neurologic: Oriented x 2-3,  speech clear  Skin: No rashes      Results Reviewed:  LAB RESULTS:      Lab 25  0735 25  1558 25  1555   WBC 7.49 7.95  --    HEMOGLOBIN 11.6* 12.3  --    HEMOGLOBIN, POC  --   --  12.9   HEMATOCRIT 38.3 37.7  --    HEMATOCRIT POC  --   --  38   PLATELETS 222 257  --    NEUTROS ABS 3.76 4.40  --    IMMATURE GRANS (ABS) 0.02 0.03  --    LYMPHS ABS 2.67 2.61  --    MONOS ABS 0.69 0.58  --    EOS ABS 0.31 0.30  --    MCV 89.7 84.2  --    PROTIME  --  14.3  --    APTT  --  37.9  --          Lab 25  0932 25  0735 25  1653 25  1555   SODIUM 138  --  138  --    POTASSIUM 4.5  --  4.0  --    CHLORIDE 100  --  99  --    CO2 27.0  --  28.0  --    ANION GAP 11.0  --  11.0  --    BUN 11  --  9  --    CREATININE 0.37*  --  0.35* 0.40*   EGFR 110.0  --  111.5 108.0   GLUCOSE 113*  --  127*  --    CALCIUM 9.7  --  9.9  --    MAGNESIUM 1.8  --   --   --    PHOSPHORUS 3.6  --    --   --    HEMOGLOBIN A1C  --  7.20*  --   --          Lab 05/01/25  0932 04/30/25  1653   TOTAL PROTEIN 6.6 7.2   ALBUMIN 4.0 4.1   GLOBULIN 2.6 3.1   ALT (SGPT) 14 8   AST (SGOT) 22 28   BILIRUBIN 0.5 0.4   ALK PHOS 160* 167*         Lab 04/30/25  1558   PROTIME 14.3   INR 1.05         Lab 05/01/25  0932   CHOLESTEROL 96   LDL CHOL 46   HDL CHOL 32*   TRIGLYCERIDES 89             Brief Urine Lab Results  (Last result in the past 365 days)        Color   Clarity   Blood   Leuk Est   Nitrite   Protein   CREAT   Urine HCG        04/09/25 1245 Yellow   Clear   Negative   Trace   Negative   Negative                   Microbiology Results Abnormal       None            XR Chest 1 View  Result Date: 4/30/2025  XR CHEST 1 VW Date of Exam: 4/30/2025 4:52 PM EDT Indication: Acute Stroke Protocol (onset < 12 hrs) Comparison: CXR 10/15/2012 Findings: The heart is normal in size. The lungs are well-expanded. Subsegmental atelectasis and/or linear fibrosis is seen at the right lung base. No consolidation or mass is seen. The left lung apex is obscured by facial structures. Bony structures appear intact.     Impression: Impression: Subsegmental atelectasis and/are linear fibrosis is seen at the right lung base. Electronically Signed: Bj Champagne MD  4/30/2025 5:21 PM EDT  Workstation ID: [id number]    CT Angiogram Head w AI Analysis of LVO  Result Date: 4/30/2025  CT ANGIOGRAM HEAD W AI ANALYSIS OF LVO, CT ANGIOGRAM NECK Date of Exam: 4/30/2025 4:28 PM EDT Indication: Neuro Deficit, acute, Stroke suspected Neuro deficit, acute stroke suspected. Comparison: None available. Technique: CTA of the head and neck was performed after the uneventful intravenous administration of 90 mL Isovue-370. Reconstructed coronal and sagittal images were also obtained. In addition, a 3-D volume rendered image was created for interpretation. Automated exposure control and iterative reconstruction methods were used. Findings: The lung apices are  clear. Evaluation of the neck soft tissues demonstrates no evidence of aerodigestive tract mass or pathologic cervical lymphadenopathy. Multilevel cervical spondylosis is present, without evidence of acute fracture or aggressive osseous lesion. Patent aortic arch with typical three-vessel branching. The subclavian arteries are patent bilaterally. Some calcific atherosclerotic change is present at the right ICA origin with mild, less than 50% stenosis present by NASCET criteria. Similar mild narrowing is present on the left. The vertebral arteries are normal in course and caliber bilaterally. Intracranially, the carotid siphons demonstrate calcific atherosclerotic changes with some mild to moderate narrowing of the cavernous and  ophthalmic segments. The anterior cerebral arteries appear patent. The right and left middle cerebral arteries demonstrate no evidence of high-grade stenosis or occlusion. There is a saccular aneurysm present involving the left MCA bifurcation, inferiorly projecting and measuring 3 mm at the neck, 5 mm in greatest transverse dimension and 7 mm neck to dome. The vertebrobasilar system is patent. The posterior cerebral arteries are normal in course and caliber bilaterally.     Impression: Impression: Mild cervical and intracranial atherosclerotic changes are present as above, without evidence of associated high-grade stenosis or large vessel occlusion. 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 Electronically Signed: Barney Valle MD  4/30/2025 4:51 PM EDT  Workstation ID: [id number]    CT Angiogram Neck  Result Date: 4/30/2025  CT ANGIOGRAM HEAD W AI ANALYSIS OF LVO, CT ANGIOGRAM NECK Date of Exam: 4/30/2025 4:28 PM EDT Indication: Neuro Deficit, acute, Stroke suspected Neuro deficit, acute stroke suspected. Comparison: None available. Technique: CTA of the head  and neck was performed after the uneventful intravenous administration of 90 mL Isovue-370. Reconstructed coronal and sagittal images were also obtained. In addition, a 3-D volume rendered image was created for interpretation. Automated exposure control and iterative reconstruction methods were used. Findings: The lung apices are clear. Evaluation of the neck soft tissues demonstrates no evidence of aerodigestive tract mass or pathologic cervical lymphadenopathy. Multilevel cervical spondylosis is present, without evidence of acute fracture or aggressive osseous lesion. Patent aortic arch with typical three-vessel branching. The subclavian arteries are patent bilaterally. Some calcific atherosclerotic change is present at the right ICA origin with mild, less than 50% stenosis present by NASCET criteria. Similar mild narrowing is present on the left. The vertebral arteries are normal in course and caliber bilaterally. Intracranially, the carotid siphons demonstrate calcific atherosclerotic changes with some mild to moderate narrowing of the cavernous and  ophthalmic segments. The anterior cerebral arteries appear patent. The right and left middle cerebral arteries demonstrate no evidence of high-grade stenosis or occlusion. There is a saccular aneurysm present involving the left MCA bifurcation, inferiorly projecting and measuring 3 mm at the neck, 5 mm in greatest transverse dimension and 7 mm neck to dome. The vertebrobasilar system is patent. The posterior cerebral arteries are normal in course and caliber bilaterally.     Impression: Impression: Mild cervical and intracranial atherosclerotic changes are present as above, without evidence of associated high-grade stenosis or large vessel occlusion. 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 Electronically Signed: Barney Valle MD   4/30/2025 4:51 PM EDT  Workstation ID: [id number]    CT Head Without Contrast Stroke Protocol  Result Date: 4/30/2025  CT HEAD WO CONTRAST STROKE PROTOCOL Date of Exam: 4/30/2025 3:48 PM EDT Indication: Neuro deficit, acute, stroke suspected Neuro Deficit, acute, Stroke suspected. Right arm weakness Comparison: None available. Technique: Axial CT images were obtained of the head without contrast administration.  Reconstructed coronal images were also obtained. Automated exposure control and iterative construction methods were used. Scan Time: 1619 hours Results discussed with Viktoria at 1645 hours. Findings: The ventricles are mildly and diffusely prominent. Sulci and cerebellar folia are not abnormally prominent. Ill-defined diminished density in cerebral white matter is consistent with mild gliosis and/or scattered old lacunar infarcts. No diminished density is seen in immediate periventricular white matter to suggest transependymal CSF. There is no CT evidence of acute intracranial hemorrhage, mass, or mass effect. No abnormality of the orbits is evident. The paranasal sinuses are well aerated. Mild mucosal thickening is seen in the left maxillary antrum. Nasal antral windows are widely patent. The middle ears are well aerated. Mastoidectomy defect on the left is well aerated.     Impression: Impression: CT scan of the head without IV contrast demonstrating mild prominence of the ventricles which may be due to atrophy or mild ventricular hypertension. Mild white matter changes consistent with gliosis and/or scattered old lacunar infarcts. No acute intracranial abnormality is seen. Electronically Signed: Bj Champagne MD  4/30/2025 4:48 PM EDT  Workstation ID: [id number]          Current medications:  Scheduled Meds:allopurinol, 100 mg, Oral, BID  atorvastatin, 80 mg, Oral, Nightly  baclofen, 10 mg, Oral, TID  busPIRone, 30 mg, Oral, BID  clopidogrel, 75 mg, Oral, Daily  clotrimazole-betamethasone, , Topical,  Q12H  gabapentin, 600 mg, Oral, Q8H  insulin lispro, 2-9 Units, Subcutaneous, 4x Daily AC & at Bedtime  pantoprazole, 40 mg, Oral, Q AM  rOPINIRole, 1 mg, Oral, TID  sodium chloride, 10 mL, Intravenous, Q12H  sodium chloride, 10 mL, Intravenous, Q12H      Continuous Infusions:   PRN Meds:.  acetaminophen    ALPRAZolam    aluminum-magnesium hydroxide-simethicone    senna-docusate sodium **AND** polyethylene glycol **AND** bisacodyl **AND** bisacodyl    Calcium Replacement - Follow Nurse / BPA Driven Protocol    dextrose    dextrose    glucagon (human recombinant)    Magnesium Standard Dose Replacement - Follow Nurse / BPA Driven Protocol    melatonin    nitroglycerin    ondansetron    Phosphorus Replacement - Follow Nurse / BPA Driven Protocol    Potassium Replacement - Follow Nurse / BPA Driven Protocol    sodium chloride    sodium chloride    sodium chloride    sodium chloride    sodium chloride    Assessment & Plan   Assessment & Plan     Active Hospital Problems    Diagnosis  POA    **Stroke-like symptoms [R29.90]  Yes    CVA (cerebral vascular accident) [I63.9]  Yes    Chronic pain disorder [G89.4]  Yes    Diabetic peripheral neuropathy associated with type 2 diabetes mellitus [E11.42]  Yes    Gastroesophageal reflux disease without esophagitis [K21.9]  Yes    Hyperlipidemia [E78.5]  Yes    Hypothyroidism [E03.9]  Yes    Benign essential hypertension [I10]  Yes    Chronic obstructive pulmonary disease [J44.9]  Yes      Resolved Hospital Problems   No resolved problems to display.        Brief Hospital Course to date:  Pohebe Call is a 70 y.o. female with history of hypertension, type 2 diabetes, prior cancer in 2000, remote CVA admitted for right-sided weakness and slurred speech.  She was evaluated by stroke neurology, unable to have MRI due to pain pump.    TIA/CVA  History of right hemispheric stroke with residual left sided weakness   Left MCA aneurysm 3mm x 5mm x 7mm; incidental  - Right sided  weakness/numbness and dysarthria, etiology suspected left hemispheric stroke  - MRI brain cannot be obtained 2/2 kyposis and pain pump, consider repeat CT head if any other change in mental status  -DC Dilaudid  - Echo    - Meds: Plavix 75mg for now, patient cannot have ASA 2/2 hives  - PT/OT/SLP evaluation  - Will need to follow-up with neurosurgery outpatient for incidental aneurysm   - Neurology following     Essential hypertension  - Permissive HTN, BP controlled off meds       HLD  - Chronic, continue statin     Diabetes Mellitus type 2     COPD  - Chronic, not in exaberbation  - Duonebs PRN     Severe LBP  Hx of surgical reconstruction, pain pump implantation  - Chronic  - Pain control PRN    Expected Discharge Location and Transportation: Awaiting PT/OT eval  Expected Discharge   Expected discharge date/ time has not been documented.     VTE Prophylaxis:  Mechanical VTE prophylaxis orders are present.         AM-PAC 6 Clicks Score (PT): 10 (04/30/25 2118)    CODE STATUS:   Code Status and Medical Interventions: CPR (Attempt to Resuscitate); Full Support   Ordered at: 04/30/25 2036     Code Status (Patient has no pulse and is not breathing):    CPR (Attempt to Resuscitate)     Medical Interventions (Patient has pulse or is breathing):    Full Support     Level Of Support Discussed With:    Patient       Maricel Perezcarols Posadas, DO  05/01/25

## 2025-05-01 NOTE — CONSULTS
"          Clinical Nutrition Assessment     Patient Name: Phoebe Call  YOB: 1955  MRN: [id number]  Date of Encounter: 05/01/25 09:31 EDT  Admission date: 4/30/2025  Reason for Visit: Identified at risk by screening criteria, MST score 2+    Assessment   Nutrition Assessment   Admission Diagnosis:  Stroke [I63.9]  Stroke-like symptoms [R29.90]  CVA (cerebral vascular accident) [I63.9]    Problem List:    Stroke-like symptoms    Benign essential hypertension    Chronic obstructive pulmonary disease    Chronic pain disorder    Diabetic peripheral neuropathy associated with type 2 diabetes mellitus    Gastroesophageal reflux disease without esophagitis    Hyperlipidemia    Hypothyroidism    CVA (cerebral vascular accident)      PMH:   She  has a past medical history of Diabetes, History of transfusion, Hypertension, and RA (rheumatoid arthritis).    PSH:  She  has a past surgical history that includes Hernia repair; Hysterectomy; Bladder suspension; Mastoid surgery; and Pain Pump Insertion/Revision (N/A, 6/21/2024).    Applicable Nutrition History:       Anthropometrics     Height: Height: 154.9 cm (61\")  Last Filed Weight: Weight: 61.7 kg (136 lb) (04/30/25 2036)  Method:    BMI: BMI (Calculated): 25.7    UBW:     Weight      Weight (kg) Weight (lbs) Weight Method   6/14/2024 62.596 kg  138 lb  Stated (S)   6/21/2024 62.596 kg  138 lb  Stated    4/30/2025 58.968 kg  130 lb      61.689 kg  136 lb        Weight change: No significant changes  bed weight at time of visit 151lb . Most likely not accurate.  Weight trend seem to be stable.     Nutrition Focused Physical Exam    Date: 5/1    Pt does not meet criteria for malnutrition diagnosis, at this time.      Subjective   Reported/Observed/Food/Nutrition Related History:   5/1  Patient triggered for nutrition assessment for MST 1 for weight loss along with consult.  Patient laying in bed at time of visit, leaned to her side, reports her shoulder and hip " hurt really bad to sit up straight to eat. She hurt herself from a fall prior to admission.  Recent admission to outside hospital late March. Patient reports she went home after that, she feels she was not able to take care of herself as needed. Reports she was set up with a sitter to help her with her meals, however she feels the sitter did not help her that much. She is not sure about weight changes.  Has good appetite if she is provided with food. Eager to eat breakfast meal.      Current Nutrition Prescription   PO: Diet: Cardiac, Diabetic; Healthy Heart (2-3 Na+); Consistent Carbohydrate; Fluid Consistency: Thin (IDDSI 0)  Oral Nutrition Supplement:   Intake: Insufficient data    Assessment & Plan   Nutrition Diagnosis   Date:  5/1            Updated:    Problem No nutrition diagnosis at this time    Etiology    Signs/Symptoms    Status: New    Goal / Objectives:   Nutrition to support treatment and Establish PO      Nutrition Intervention      Follow treatment progress, Care plan reviewed, Interview for preferences, Encourage intake    Patient does not meet criteria for MSA at this time   Monitor intake during admission   ONS if warranted     Monitoring/Evaluation:   Per protocol, I&O, PO intake, Pertinent labs, Symptoms    Kelly Contreras RD  Time Spent: 25min

## 2025-05-01 NOTE — CONSULTS
Diabetes Education    Patient Name:  Phoebe Call  YOB: 1955  MRN: [id number]  Admit Date:  4/30/2025        Chart reviewed for diabetes ed. Most recent documentation suggests pt confused, may not be appropriate for ed today, we will follow. Thank you.       Electronically signed by:  Lisbeth Watson RN  05/01/25 13:10 EDT

## 2025-05-01 NOTE — NURSING NOTE
WOC consulted for specialty bed.    70 y.o. female with PMHx of HTN, DM 2, , RA, Hx of pancreatic cancer (2011), COPD, chronic dysphagia, remote CVA (1990s, residual left-sided weakness), and chronic pain 2/2 injury with broken back s/p reconstruction and insertion of pain pump who presented for evaluation of right sided weakness/numbness and slurred speech.     Per nursing: immobile with contractures    Sensory Perception: 2-->very limited  Moisture: 3-->occasionally moist  Activity: 2-->chairfast  Mobility: 2-->very limited  Nutrition: 3-->adequate  Friction and Shear: 2-->potential problem  Bucky Score: 14 (04/30/25 2128)    Body mass index is 25.7 kg/m².      Specialty bed ordered from [facility].    Please implement all pressure injury prevention measures. HIGH risk for PI development.    WOC will sign off.  Re consult if new issues arise.     Robert Parrish RN, BSN, CWOCN  Wound, Ostomy and Continence (WOC) Department  [facility]

## 2025-05-02 ENCOUNTER — APPOINTMENT (OUTPATIENT)
Dept: CT IMAGING | Facility: HOSPITAL | Age: 70
DRG: 065 | End: 2025-05-02
Payer: MEDICARE

## 2025-05-02 LAB
ALBUMIN SERPL-MCNC: 3.7 G/DL (ref 3.5–5.2)
ANION GAP SERPL CALCULATED.3IONS-SCNC: 12 MMOL/L (ref 5–15)
BASOPHILS # BLD AUTO: 0.03 10*3/MM3 (ref 0–0.2)
BASOPHILS NFR BLD AUTO: 0.4 % (ref 0–1.5)
BUN SERPL-MCNC: 11 MG/DL (ref 8–23)
BUN/CREAT SERPL: 33.3 (ref 7–25)
CALCIUM SPEC-SCNC: 9.5 MG/DL (ref 8.6–10.5)
CHLORIDE SERPL-SCNC: 97 MMOL/L (ref 98–107)
CO2 SERPL-SCNC: 26 MMOL/L (ref 22–29)
CREAT SERPL-MCNC: 0.33 MG/DL (ref 0.57–1)
DEPRECATED RDW RBC AUTO: 51.2 FL (ref 37–54)
EGFRCR SERPLBLD CKD-EPI 2021: 113.1 ML/MIN/1.73
EOSINOPHIL # BLD AUTO: 0.23 10*3/MM3 (ref 0–0.4)
EOSINOPHIL NFR BLD AUTO: 3 % (ref 0.3–6.2)
ERYTHROCYTE [DISTWIDTH] IN BLOOD BY AUTOMATED COUNT: 15.7 % (ref 12.3–15.4)
GLUCOSE BLDC GLUCOMTR-MCNC: 111 MG/DL (ref 70–130)
GLUCOSE BLDC GLUCOMTR-MCNC: 159 MG/DL (ref 70–130)
GLUCOSE BLDC GLUCOMTR-MCNC: 169 MG/DL (ref 70–130)
GLUCOSE BLDC GLUCOMTR-MCNC: 247 MG/DL (ref 70–130)
GLUCOSE SERPL-MCNC: 112 MG/DL (ref 65–99)
HCT VFR BLD AUTO: 39.6 % (ref 34–46.6)
HGB BLD-MCNC: 12.2 G/DL (ref 12–15.9)
IMM GRANULOCYTES # BLD AUTO: 0.02 10*3/MM3 (ref 0–0.05)
IMM GRANULOCYTES NFR BLD AUTO: 0.3 % (ref 0–0.5)
LYMPHOCYTES # BLD AUTO: 2.42 10*3/MM3 (ref 0.7–3.1)
LYMPHOCYTES NFR BLD AUTO: 31.6 % (ref 19.6–45.3)
MCH RBC QN AUTO: 27.4 PG (ref 26.6–33)
MCHC RBC AUTO-ENTMCNC: 30.8 G/DL (ref 31.5–35.7)
MCV RBC AUTO: 88.8 FL (ref 79–97)
MONOCYTES # BLD AUTO: 0.85 10*3/MM3 (ref 0.1–0.9)
MONOCYTES NFR BLD AUTO: 11.1 % (ref 5–12)
NEUTROPHILS NFR BLD AUTO: 4.1 10*3/MM3 (ref 1.7–7)
NEUTROPHILS NFR BLD AUTO: 53.6 % (ref 42.7–76)
NRBC BLD AUTO-RTO: 0 /100 WBC (ref 0–0.2)
PHOSPHATE SERPL-MCNC: 2.8 MG/DL (ref 2.5–4.5)
PLATELET # BLD AUTO: 176 10*3/MM3 (ref 140–450)
PMV BLD AUTO: 10.7 FL (ref 6–12)
POTASSIUM SERPL-SCNC: 4.4 MMOL/L (ref 3.5–5.2)
RBC # BLD AUTO: 4.46 10*6/MM3 (ref 3.77–5.28)
SODIUM SERPL-SCNC: 135 MMOL/L (ref 136–145)
WBC NRBC COR # BLD AUTO: 7.65 10*3/MM3 (ref 3.4–10.8)

## 2025-05-02 PROCEDURE — 85025 COMPLETE CBC W/AUTO DIFF WBC: CPT | Performed by: INTERNAL MEDICINE

## 2025-05-02 PROCEDURE — 70450 CT HEAD/BRAIN W/O DYE: CPT

## 2025-05-02 PROCEDURE — 80069 RENAL FUNCTION PANEL: CPT | Performed by: INTERNAL MEDICINE

## 2025-05-02 PROCEDURE — 99232 SBSQ HOSP IP/OBS MODERATE 35: CPT | Performed by: INTERNAL MEDICINE

## 2025-05-02 PROCEDURE — 63710000001 DIPHENHYDRAMINE PER 50 MG: Performed by: INTERNAL MEDICINE

## 2025-05-02 PROCEDURE — 99232 SBSQ HOSP IP/OBS MODERATE 35: CPT | Performed by: NURSE PRACTITIONER

## 2025-05-02 PROCEDURE — 82948 REAGENT STRIP/BLOOD GLUCOSE: CPT

## 2025-05-02 PROCEDURE — 97166 OT EVAL MOD COMPLEX 45 MIN: CPT

## 2025-05-02 PROCEDURE — 97162 PT EVAL MOD COMPLEX 30 MIN: CPT

## 2025-05-02 PROCEDURE — 63710000001 INSULIN LISPRO (HUMAN) PER 5 UNITS: Performed by: STUDENT IN AN ORGANIZED HEALTH CARE EDUCATION/TRAINING PROGRAM

## 2025-05-02 RX ORDER — FLUCONAZOLE 100 MG/1
100 TABLET ORAL EVERY 24 HOURS
Status: DISCONTINUED | OUTPATIENT
Start: 2025-05-02 | End: 2025-05-03

## 2025-05-02 RX ORDER — DIPHENHYDRAMINE HCL 25 MG
25 CAPSULE ORAL EVERY 6 HOURS PRN
Status: DISCONTINUED | OUTPATIENT
Start: 2025-05-02 | End: 2025-05-09 | Stop reason: HOSPADM

## 2025-05-02 RX ORDER — ATORVASTATIN CALCIUM 40 MG/1
40 TABLET, FILM COATED ORAL NIGHTLY
Status: DISCONTINUED | OUTPATIENT
Start: 2025-05-02 | End: 2025-05-09 | Stop reason: HOSPADM

## 2025-05-02 RX ADMIN — GABAPENTIN 600 MG: 300 CAPSULE ORAL at 21:45

## 2025-05-02 RX ADMIN — ROPINIROLE 1 MG: 1 TABLET, FILM COATED ORAL at 16:30

## 2025-05-02 RX ADMIN — INSULIN LISPRO 2 UNITS: 100 INJECTION, SOLUTION INTRAVENOUS; SUBCUTANEOUS at 17:14

## 2025-05-02 RX ADMIN — FLUCONAZOLE 100 MG: 100 TABLET ORAL at 09:11

## 2025-05-02 RX ADMIN — CLOTRIMAZOLE AND BETAMETHASONE DIPROPIONATE: 10; .5 CREAM TOPICAL at 21:45

## 2025-05-02 RX ADMIN — CLOTRIMAZOLE AND BETAMETHASONE DIPROPIONATE: 10; .5 CREAM TOPICAL at 09:11

## 2025-05-02 RX ADMIN — PANTOPRAZOLE SODIUM 40 MG: 40 TABLET, DELAYED RELEASE ORAL at 05:12

## 2025-05-02 RX ADMIN — DIPHENHYDRAMINE HYDROCHLORIDE 25 MG: 25 CAPSULE ORAL at 10:15

## 2025-05-02 RX ADMIN — ALLOPURINOL 100 MG: 100 TABLET ORAL at 09:11

## 2025-05-02 RX ADMIN — BACLOFEN 10 MG: 10 TABLET ORAL at 20:53

## 2025-05-02 RX ADMIN — BACLOFEN 10 MG: 10 TABLET ORAL at 09:11

## 2025-05-02 RX ADMIN — GABAPENTIN 600 MG: 300 CAPSULE ORAL at 05:12

## 2025-05-02 RX ADMIN — ROPINIROLE 1 MG: 1 TABLET, FILM COATED ORAL at 09:11

## 2025-05-02 RX ADMIN — INSULIN LISPRO 2 UNITS: 100 INJECTION, SOLUTION INTRAVENOUS; SUBCUTANEOUS at 21:01

## 2025-05-02 RX ADMIN — BACLOFEN 10 MG: 10 TABLET ORAL at 16:30

## 2025-05-02 RX ADMIN — ATORVASTATIN CALCIUM 40 MG: 40 TABLET, FILM COATED ORAL at 20:53

## 2025-05-02 RX ADMIN — ROPINIROLE 1 MG: 1 TABLET, FILM COATED ORAL at 20:53

## 2025-05-02 RX ADMIN — ALLOPURINOL 100 MG: 100 TABLET ORAL at 20:53

## 2025-05-02 RX ADMIN — CLOPIDOGREL BISULFATE 75 MG: 75 TABLET, FILM COATED ORAL at 09:11

## 2025-05-02 RX ADMIN — BUSPIRONE HYDROCHLORIDE 30 MG: 10 TABLET ORAL at 20:53

## 2025-05-02 RX ADMIN — Medication 10 ML: at 09:16

## 2025-05-02 RX ADMIN — GABAPENTIN 600 MG: 300 CAPSULE ORAL at 16:30

## 2025-05-02 RX ADMIN — INSULIN LISPRO 4 UNITS: 100 INJECTION, SOLUTION INTRAVENOUS; SUBCUTANEOUS at 13:13

## 2025-05-02 RX ADMIN — BUSPIRONE HYDROCHLORIDE 30 MG: 10 TABLET ORAL at 09:11

## 2025-05-02 NOTE — THERAPY EVALUATION
Patient Name: Phoebe Call  : 1955    MRN: [id number]                              Today's Date: 2025       Admit Date: 2025    Visit Dx:     ICD-10-CM ICD-9-CM   1. Acute right-sided weakness  R53.1 728.87   2. Right sided numbness  R20.0 782.0   3. Dysarthria  R47.1 784.51   4. History of CVA (cerebrovascular accident)  Z86.73 V12.54   5. History of rheumatoid arthritis  Z87.39 V13.4   6. History of diabetes mellitus  Z86.39 V12.29   7. History of hypertension  Z86.79 V12.59   8. Cognitive communication deficit  R41.841 799.52     Patient Active Problem List   Diagnosis    Stroke-like symptoms    Benign essential hypertension    Chronic obstructive pulmonary disease    Chronic pain disorder    Diabetic peripheral neuropathy associated with type 2 diabetes mellitus    Gastroesophageal reflux disease without esophagitis    Hyperlipidemia    Hypothyroidism    CVA (cerebral vascular accident)     Past Medical History:   Diagnosis Date    Diabetes     History of transfusion     [facility], no reaction    Hypertension     RA (rheumatoid arthritis)      Past Surgical History:   Procedure Laterality Date    BLADDER SUSPENSION      HERNIA REPAIR      HYSTERECTOMY      MASTOID SURGERY      PAIN PUMP INSERTION/REVISION N/A 2024    Procedure: INTRATHECAL CATHETER AND PAIN PUMP IMPLANT;  Surgeon: Cristi Egan MD;  Location: [facility];  Service: Pain Management;  Laterality: N/A;      General Information       Row Name 25 0927          Physical Therapy Time and Intention    Document Type evaluation  -KR     Mode of Treatment physical therapy  -KR       Row Name 25 0927          General Information    Patient Profile Reviewed yes  -KR     Prior Level of Function --  pt poor historian; per chart review, pt independent with FWW and ADLs  -KR     Existing Precautions/Restrictions fall;other (see comments)  remote CVA with residual L weakness  -KR     Barriers to Rehab medically  "complex;previous functional deficit;cognitive status  -KR       Row Name 05/02/25 0927          Living Environment    Current Living Arrangements apartment  -KR     People in Home alone  -KR       Row Name 05/02/25 0927          Home Main Entrance    Number of Stairs, Main Entrance other (see comments)  home environment will require further inquiry  -KR       Row Name 05/02/25 0927          Cognition    Orientation Status (Cognition) disoriented to;person;time;oriented to;place  -KR       Row Name 05/02/25 0927          Safety Issues/Impairments Affecting Functional Mobility    Safety Issues Affecting Function (Mobility) ability to follow commands;awareness of need for assistance;insight into deficits/self-awareness;judgment;problem-solving;safety precaution awareness;sequencing abilities;safety precautions follow-through/compliance  -KR     Impairments Affecting Function (Mobility) balance;cognition;strength;endurance/activity tolerance;postural/trunk control;pain  -KR     Cognitive Impairments, Mobility Safety/Performance attention;awareness, need for assistance;insight into deficits/self-awareness;judgment;problem-solving/reasoning;sequencing abilities;safety precaution follow-through;safety precaution awareness  -KR               User Key  (r) = Recorded By, (t) = Taken By, (c) = Cosigned By      Initials Name Provider Type    KR Evette Goncalves, PT Physical Therapist                   Mobility       Row Name 05/02/25 0929          Bed-Chair Transfer    Bed-Chair Nunez (Transfers) moderate assist (50% patient effort);2 person assist  -KR     Comment, (Bed-Chair Transfer) pt unable to following cues for safe hand placement despite max verbal and tactile cueing. Pt maintained significantly forward flexed posture throughout transfer and perseverating on \"I'm going to fall.\"  -KR       Row Name 05/02/25 0929          Sit-Stand Transfer    Sit-Stand Nunez (Transfers) moderate assist (50% patient " effort);2 person assist  -KR     Comment, (Sit-Stand Transfer) 1x from bed with tactile and verbal cues for safe hand placement.  -KR       Row Name 05/02/25 0925          Gait/Stairs (Locomotion)    [place] Level (Gait) unable to assess  -KR     Comment, (Gait/Stairs) pt unable to follow enough verbal cues to safely attempt ambulation  -KR               User Key  (r) = Recorded By, (t) = Taken By, (c) = Cosigned By      Initials Name Provider Type    KR Evette Goncalves PT Physical Therapist                   Obj/Interventions       Row Name 05/02/25 0930          Range of Motion Comprehensive    General Range of Motion lower extremity range of motion deficits identified  -KR     Comment, General Range of Motion B knee extension limited by ~20 degrees  -KR       Row Name 05/02/25 0930          Strength Comprehensive (MMT)    General Manual Muscle Testing (MMT) Assessment lower extremity strength deficits identified  -KR     Comment, General Manual Muscle Testing (MMT) Assessment unable to formally assess d/t impaired command following, however pt demos at least 3/5 grossly in BLEs with functional movement  -KR       Row Name 05/02/25 0930          Balance    Balance Assessment sitting static balance;sitting dynamic balance;standing static balance;standing dynamic balance  -KR     Static Sitting Balance standby assist  -KR     Dynamic Sitting Balance contact guard  -KR     Position, Sitting Balance unsupported;sitting edge of bed;sitting in chair  -KR     Static Standing Balance moderate assist;2-person assist;non-verbal cues (demo/gesture);verbal cues  -KR     Dynamic Standing Balance moderate assist;2-person assist;non-verbal cues (demo/gesture);verbal cues  -KR     Position/Device Used, Standing Balance supported;other (see comments)  BUE support  -KR     Balance Interventions sitting;standing;sit to stand;supported;static;dynamic  -KR       Row Name 05/02/25 0943          Sensory Assessment (Somatosensory)     Sensory Assessment (Somatosensory) LE sensation intact  -KR               User Key  (r) = Recorded By, (t) = Taken By, (c) = Cosigned By      Initials Name Provider Type    Evette Lopez PT Physical Therapist                   Goals/Plan       Row Name 05/02/25 0934          Bed Mobility Goal 1 (PT)    Activity/Assistive Device (Bed Mobility Goal 1, PT) bed mobility activities, all  -KR     [place] Level/Cues Needed (Bed Mobility Goal 1, PT) standby assist  -KR     Time Frame (Bed Mobility Goal 1, PT) short term goal (STG);4 days  -KR       Row Name 05/02/25 0934          Transfer Goal 1 (PT)    Activity/Assistive Device (Transfer Goal 1, PT) sit-to-stand/stand-to-sit;bed-to-chair/chair-to-bed;walker, rolling  -KR     [place] Level/Cues Needed (Transfer Goal 1, PT) standby assist  -KR     Time Frame (Transfer Goal 1, PT) long term goal (LTG);1 week  -KR       Row Name 05/02/25 0934          Gait Training Goal 1 (PT)    Activity/Assistive Device (Gait Training Goal 1, PT) gait (walking locomotion);improve balance and speed;increase endurance/gait distance;assistive device use;walker, rolling  -KR     [place] Level (Gait Training Goal 1, PT) contact guard required  -KR     Distance (Gait Training Goal 1, PT) 50  -KR     Time Frame (Gait Training Goal 1, PT) long term goal (LTG);1 week  -KR       Row Name 05/02/25 0934          Therapy Assessment/Plan (PT)    Planned Therapy Interventions (PT) motor coordination training;bed mobility training;balance training;home exercise program;gait training;neuromuscular re-education;patient/family education;postural re-education;transfer training;strengthening;stretching;ROM (range of motion);stair training  -KR               User Key  (r) = Recorded By, (t) = Taken By, (c) = Cosigned By      Initials Name Provider Type    Evette Lopez PT Physical Therapist                   Clinical Impression       Row Name 05/02/25 0931          Pain    Pain  Location back  -KR     Pain Side/Orientation posterior  -KR     Pain Management Interventions positioning techniques utilized;exercise or physical activity utilized  -KR     Response to Pain Interventions activity participation with tolerable pain  -KR       Row Name 05/02/25 0931          Pain Scale: FACES Pre/Post-Treatment    Pain: FACES Scale, Pretreatment 6-->hurts even more  -KR     Posttreatment Pain Rating 4-->hurts little more  -KR       Row Name 05/02/25 0931          Plan of Care Review    Plan of Care Reviewed With patient  -KR     Outcome Evaluation Pt presents below baseline for cognition, balance, endurance, and strength contributing to bed mobility, transfer, and ambulation deficits. Pt with poor command following throughout evaluation and difficulty following cues for safety, thus ambulation unable to be attempted at this time. Pt will benefit from PT to address aforementioned deficits and return to PLOF. PT rec SNF upon dc.  -KR       Row Name 05/02/25 0931          Therapy Assessment/Plan (PT)    Patient/Family Therapy Goals Statement (PT) unable to state  -KR     Rehab Potential (PT) fair  -KR     Criteria for Skilled Interventions Met (PT) skilled treatment is necessary;meets criteria;yes  -KR     Therapy Frequency (PT) daily  -KR     Predicted Duration of Therapy Intervention (PT) 2 weeks  -KR       Row Name 05/02/25 0931          Vital Signs    Pre Systolic BP Rehab 166  -KR     Pre Treatment Diastolic BP 71  -KR     Post Systolic BP Rehab 162  -KR     Post Treatment Diastolic BP 86  -KR     Post SpO2 (%) 92  -KR     O2 Delivery Post Treatment room air  -KR     Pre Patient Position Sitting  -KR     Intra Patient Position Standing  -KR     Post Patient Position Sitting  -KR       Row Name 05/02/25 0931          Positioning and Restraints    Pre-Treatment Position in bed  -KR     Post Treatment Position chair  -KR     In Chair notified nsg;reclined;encouraged to call for assist;exit alarm  on;call light within reach;waffle cushion;legs elevated;on mechanical lift sling;heels elevated  -KR               User Key  (r) = Recorded By, (t) = Taken By, (c) = Cosigned By      Initials Name Provider Type    Evette Lopez PT Physical Therapist                   Outcome Measures       Row Name 05/02/25 0934 05/02/25 0810       How much help from another person do you currently need...    Turning from your back to your side while in flat bed without using bedrails? 3  -KR 2  -LM    Moving from lying on back to sitting on the side of a flat bed without bedrails? 2  -KR 2  -LM    Moving to and from a bed to a chair (including a wheelchair)? 2  -KR 1  -LM    Standing up from a chair using your arms (e.g., wheelchair, bedside chair)? 2  -KR 1  -LM    Climbing 3-5 steps with a railing? 1  -KR 1  -LM    To walk in hospital room? 2  -KR 1  -LM    AM-PAC 6 Clicks Score (PT) 12  -KR 8  -LM    Highest Level of Mobility Goal 4 --> Transfer to chair/commode  -KR 3 --> Sit at edge of bed  -LM      Row Name 05/02/25 0934          Modified Bond Scale    Pre-Stroke Modified Bond Scale 6 - Unable to determine (UTD) from the medical record documentation  -KR     Modified [place] Scale 4 - Moderately severe disability.  Unable to walk without assistance, and unable to attend to own bodily needs without assistance.  -KR       Row Name 05/02/25 0934          Functional Assessment    Outcome Measure Options AM-PAC 6 Clicks Basic Mobility (PT);Modified Bond  -KR               User Key  (r) = Recorded By, (t) = Taken By, (c) = Cosigned By      Initials Name Provider Type    Li Corona RN Registered Nurse    Evette Lopez, RODERICK Physical Therapist                                 Physical Therapy Education       Title: PT OT SLP Therapies (In Progress)       Topic: Physical Therapy (In Progress)       Point: Mobility training (In Progress)       Learning Progress Summary            Patient Acceptance, E, NR,NL by  KR at 5/2/2025 0935                      Point: Home exercise program (Not Started)       Learner Progress:  Not documented in this visit.              Point: Body mechanics (In Progress)       Learning Progress Summary            Patient Acceptance, E, NR,NL by ADRY at 5/2/2025 0935                      Point: Precautions (In Progress)       Learning Progress Summary            Patient Acceptance, E, NR,NL by KR at 5/2/2025 0935                                      User Key       Initials Effective Dates Name Provider Type Discipline    ADRY 12/30/22 -  Evette Goncalves, RODERICK Physical Therapist PT                  PT Recommendation and Plan  Planned Therapy Interventions (PT): motor coordination training, bed mobility training, balance training, home exercise program, gait training, neuromuscular re-education, patient/family education, postural re-education, transfer training, strengthening, stretching, ROM (range of motion), stair training  Outcome Evaluation: Pt presents below baseline for cognition, balance, endurance, and strength contributing to bed mobility, transfer, and ambulation deficits. Pt with poor command following throughout evaluation and difficulty following cues for safety, thus ambulation unable to be attempted at this time. Pt will benefit from PT to address aforementioned deficits and return to PLOF. PT rec SNF upon dc.     Time Calculation:   PT Evaluation Complexity  History, PT Evaluation Complexity: 3 or more personal factors and/or comorbidities  Examination of Body Systems (PT Eval Complexity): total of 4 or more elements  Clinical Presentation (PT Evaluation Complexity): evolving  Clinical Decision Making (PT Evaluation Complexity): moderate complexity  Overall Complexity (PT Evaluation Complexity): moderate complexity     PT Charges       Row Name 05/02/25 0935             Time Calculation    Start Time 0828  -KR      PT Received On 05/02/25  -ADRY      PT Goal Re-Cert Due Date 05/12/25  -KR          Untimed Charges    PT Eval/Re-eval Minutes 47  -KR         Total Minutes    Untimed Charges Total Minutes 47  -KR       Total Minutes 47  -KR                User Key  (r) = Recorded By, (t) = Taken By, (c) = Cosigned By      Initials Name Provider Type    Evette Lopez, PT Physical Therapist                  Therapy Charges for Today       Code Description Service Date Service Provider Modifiers Qty    98671672604 HC PT EVAL MOD COMPLEXITY 4 5/2/2025 Evette Goncalves, PT GP 1            PT G-Codes  Outcome Measure Options: AM-PAC 6 Clicks Basic Mobility (PT), Modified Keely  AM-PAC 6 Clicks Score (PT): 12  Modified [place] Scale: 4 - Moderately severe disability.  Unable to walk without assistance, and unable to attend to own bodily needs without assistance.  PT Discharge Summary  Anticipated Discharge Disposition (PT): skilled nursing facility    Evette Goncalves, RODERICK  5/2/2025

## 2025-05-02 NOTE — PLAN OF CARE
Goal Outcome Evaluation:  Plan of Care Reviewed With: patient           Outcome Evaluation: Pt presents below baseline for cognition, balance, endurance, and strength contributing to bed mobility, transfer, and ambulation deficits. Pt with poor command following throughout evaluation and difficulty following cues for safety, thus ambulation unable to be attempted at this time. Pt will benefit from PT to address aforementioned deficits and return to PLOF. PT rec SNF upon dc.    Anticipated Discharge Disposition (PT): skilled nursing facility

## 2025-05-02 NOTE — CASE MANAGEMENT/SOCIAL WORK
Continued Stay Note  [facility]     Patient Name: Phoebe Call  MRN: [id number]  Today's Date: 5/2/2025    Admit Date: 4/30/2025    Plan: Home with family   Discharge Plan       Row Name 05/02/25 0914       Plan    Plan Home with family    Patient/Family in Agreement with Plan yes    Plan Comments Spoke to patient at bedside. Plan is home with family. Family will transport. CM will continue to follow.    Final Discharge Disposition Code 01 - home or self-care                   Discharge Codes    No documentation.                       James Vasquez RN

## 2025-05-02 NOTE — PROGRESS NOTES
Stroke Progress Note       Chief Complaint: Right-sided weakness and confusion    Subjective    Subjective     Subjective:  Resting in bed at dinner.  Currently no family is present at the bedside.  Patient has no current complaints but tells me that she does not like her dinner tray.    Review of Systems   Constitutional: No fatigue      Objective      Temp:  [98.2 °F (36.8 °C)-99.6 °F (37.6 °C)] 98.3 °F (36.8 °C)  Heart Rate:  [78-91] 80  Resp:  [16-18] 16  BP: (114-166)/(52-91) 166/71    Objective    GEN: lying in bed; in NAD  HENT: normocephalic, non-erythematous oropharynx    NEURO: The patient is kyphotic  Mental Status: A&O x 2, interactive, able to follow commands  Speech: Intact Articulation  CN 2-12:  II - PERRLA  III, IV, VI - EOMI  VII -no gross facial asymmetry  XII - Tongue protrudes midline    Motor: The patient can move bilateral upper extremity slightly against gravity and she can move bilateral lower extremity with gravity slightly with the right upper and lower extremity being weaker than the left side  Sensory: intact light touch throughout  Reflexes: negative Overton's sign BL  Coordination: no ataxia with finger-to-nose testing  Gait/Station: deferred     Results Review:    I reviewed the patient's new clinical results.    WBC   Date Value Ref Range Status   05/02/2025 7.65 3.40 - 10.80 10*3/mm3 Final     RBC   Date Value Ref Range Status   05/02/2025 4.46 3.77 - 5.28 10*6/mm3 Final     Hemoglobin   Date Value Ref Range Status   05/02/2025 12.2 12.0 - 15.9 g/dL Final     Hematocrit   Date Value Ref Range Status   05/02/2025 39.6 34.0 - 46.6 % Final     MCV   Date Value Ref Range Status   05/02/2025 88.8 79.0 - 97.0 fL Final     MCH   Date Value Ref Range Status   05/02/2025 27.4 26.6 - 33.0 pg Final     MCHC   Date Value Ref Range Status   05/02/2025 30.8 (L) 31.5 - 35.7 g/dL Final     RDW   Date Value Ref Range Status   05/02/2025 15.7 (H) 12.3 - 15.4 % Final     RDW-SD   Date Value Ref Range  Status   05/02/2025 51.2 37.0 - 54.0 fl Final     MPV   Date Value Ref Range Status   05/02/2025 10.7 6.0 - 12.0 fL Final     Platelets   Date Value Ref Range Status   05/02/2025 176 140 - 450 10*3/mm3 Final     Neutrophil %   Date Value Ref Range Status   05/02/2025 53.6 42.7 - 76.0 % Final     Lymphocyte %   Date Value Ref Range Status   05/02/2025 31.6 19.6 - 45.3 % Final     Monocyte %   Date Value Ref Range Status   05/02/2025 11.1 5.0 - 12.0 % Final     Eosinophil %   Date Value Ref Range Status   05/02/2025 3.0 0.3 - 6.2 % Final     Basophil %   Date Value Ref Range Status   05/02/2025 0.4 0.0 - 1.5 % Final     Immature Grans %   Date Value Ref Range Status   05/02/2025 0.3 0.0 - 0.5 % Final     Neutrophils, Absolute   Date Value Ref Range Status   05/02/2025 4.10 1.70 - 7.00 10*3/mm3 Final     Lymphocytes, Absolute   Date Value Ref Range Status   05/02/2025 2.42 0.70 - 3.10 10*3/mm3 Final     Monocytes, Absolute   Date Value Ref Range Status   05/02/2025 0.85 0.10 - 0.90 10*3/mm3 Final     Eosinophils, Absolute   Date Value Ref Range Status   05/02/2025 0.23 0.00 - 0.40 10*3/mm3 Final     Basophils, Absolute   Date Value Ref Range Status   05/02/2025 0.03 0.00 - 0.20 10*3/mm3 Final     Immature Grans, Absolute   Date Value Ref Range Status   05/02/2025 0.02 0.00 - 0.05 10*3/mm3 Final     nRBC   Date Value Ref Range Status   05/02/2025 0.0 0.0 - 0.2 /100 WBC Final       Lab Results   Component Value Date    GLUCOSE 112 (H) 05/02/2025    BUN 11 05/02/2025    CREATININE 0.33 (L) 05/02/2025     (L) 05/02/2025    K 4.4 05/02/2025    CL 97 (L) 05/02/2025    CALCIUM 9.5 05/02/2025    PROTEINTOT 6.6 05/01/2025    ALBUMIN 3.7 05/02/2025    ALT 14 05/01/2025    AST 22 05/01/2025    ALKPHOS 160 (H) 05/01/2025    BILITOT 0.5 05/01/2025    GLOB 2.6 05/01/2025    AGRATIO 1.5 05/01/2025    BCR 33.3 (H) 05/02/2025    ANIONGAP 12.0 05/02/2025    EGFR 113.1 05/02/2025     CT Head Without Contrast  Result Date:  5/2/2025  Impression: 1.No acute intracranial process identified. 2.Mild paranasal sinus mucosal disease. Electronically Signed: Cristi Be MD  5/2/2025 12:16 PM EDT  Workstation ID: [id number]    XR Chest 1 View  Result Date: 4/30/2025  Impression: Subsegmental atelectasis and/are linear fibrosis is seen at the right lung base. Electronically Signed: Bj Champagne MD  4/30/2025 5:21 PM EDT  Workstation ID: [id number]    CT Angiogram Head w AI Analysis of LVO  Result Date: 4/30/2025  Impression: Mild cervical and intracranial atherosclerotic changes are present as above, without evidence of associated high-grade stenosis or large vessel occlusion. 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 Electronically Signed: Barney Valle MD  4/30/2025 4:51 PM EDT  Workstation ID: [id number]    CT Angiogram Neck  Result Date: 4/30/2025  Impression: Mild cervical and intracranial atherosclerotic changes are present as above, without evidence of associated high-grade stenosis or large vessel occlusion. 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 Electronically Signed: Barney Valle MD  4/30/2025 4:51 PM EDT  Workstation ID: [id number]    CT Head Without Contrast Stroke Protocol  Result Date: 4/30/2025  Impression: CT scan of the head without IV contrast demonstrating mild prominence of the ventricles which may be due to atrophy or mild ventricular hypertension. Mild white matter changes consistent with gliosis and/or scattered old lacunar infarcts. No acute intracranial abnormality is seen. Electronically Signed: Bj Champagne MD  4/30/2025 4:48 PM EDT  Workstation ID: [id number]    -[facility] wo on 4/30/2025 images were personally reviewed and showed no acute ischemic or  hemorrhagic stroke  -CTA of the head and neck images from 4/30/2025 were personally reviewed and showed no flow limiting stenosis or LVO.  There is saccular aneurysm of the left MCA bifurcation  -MRI brain images is pending  -A1c from 5/1/2025 was 7.2%  -LDL from 5/1/2025 was 46    Assessment/Plan     This is a 70-year-old female with known medical diagnoses of essential hypertension, diabetes mellitus type 2, rheumatoid arthritis, history of pancreatic cancer (2011), COPD, chronic dysphagia, remote stroke (1990s, residual left-sided weakness), and chronic pain 2/2 injury with broken back s/p reconstruction and chronic kyphosis with insertion of pain pump presents to the emergency department via EMS for further evaluation of right-sided weakness, numbness, and dysarthria.  Patient's LKW was 0300 this morning therefore she is not a candidate for IV thrombolytic therapy.  There was a delay in obtaining CT imaging secondary to patient's pain and chronic kyphosis she required sedation to assist with lying flat on the scanner.  CTA head/neck is negative for flow-limiting stenosis or LVO; incidental LMCA aneurysm noted.  Patient will require admission to the hospitalist service for further evaluation.     Antiplatelet PTA: None  Anticoagulant PTA: None    #Right sided weakness/numbness and dysarthria  #History of right hemispheric stroke with residual left sided weakness   #Left MCA aneurysm 3mm x 5mm x 7mm; incidental  -Etiology of patient's symptoms likely left hemispheric infarct  -[facility] wo on 4/30/2025 images were personally reviewed and showed no acute ischemic or hemorrhagic stroke  -CTA of the head and neck images from 4/30/2025 were personally reviewed and showed no flow limiting stenosis or LVO.  There is saccular aneurysm of the left MCA bifurcation  -Repeat CTH 5/2/2025 reviewed and negative   -Transthoracic echocardiogram is pending  -A1c from 5/1/2025 was 7.2%  -LDL from 5/1/2025 was 46    Recommendations:  -  MRI brain cannot be obtained 2/2 kyposis and pain pump  - Repeat CTH today to assess for infarct is negative, however patient still has notable right upper extremity weakness  - TTE: STILL PENDING  - A1c and lipid panel in AM  - Meds: Continue Plavix 75 mg daily for secondary stroke prevention.  - Recommend Discontinuing outpatient omeprazole and replace with Protonix to decrease interaction with Plavix.   - Activity as tolerated, fall risk precautions  - PT/OT/SLP recommend IPR, however patient plan is home   - Will need to follow-up with neurosurgery outpatient for incidental aneurysm     #Essential hypertension  - Blood pressure goals of normotension  - Overall management per Hospital Medicine Team      #Hyperlipidemia  - Atorvastatin 40 mg nightly  - Goal LDL <70 for secondary stroke prevention      #Diabetes Mellitus type 2  - Maintain euglycemia  - Management per primary team  - Goal A1c <7.0 for secondary stroke prevention     Stroke Neurology will follow along for results of Echocardiogram. Plan discussed with the patient. Please call with any questions or concerns. Thank you.   =================================  SANDIP Camargo  Stroke Neurology   [facility]  05/02/25

## 2025-05-02 NOTE — PLAN OF CARE
Goal Outcome Evaluation:  Plan of Care Reviewed With: (P) patient           Outcome Evaluation: (P) Pt limited historian due to cognitive deficit. Pt presents with impaired cognition, balance deficits, generalized weakness, poor safety awareness, and decreased ADL participation warranting benefit of IPOT services. Pt engaged in feeding, LB dressing, and SPT to chair demonstrating poor command following req max cues for safety. Recommend SNF at d/c.    Anticipated Discharge Disposition (OT): (P) skilled nursing facility

## 2025-05-02 NOTE — THERAPY EVALUATION
Patient Name: Phoebe Call  : 1955    MRN: [id number]                              Today's Date: 2025       Admit Date: 2025    Visit Dx:     ICD-10-CM ICD-9-CM   1. Acute right-sided weakness  R53.1 728.87   2. Right sided numbness  R20.0 782.0   3. Dysarthria  R47.1 784.51   4. History of CVA (cerebrovascular accident)  Z86.73 V12.54   5. History of rheumatoid arthritis  Z87.39 V13.4   6. History of diabetes mellitus  Z86.39 V12.29   7. History of hypertension  Z86.79 V12.59   8. Cognitive communication deficit  R41.841 799.52     Patient Active Problem List   Diagnosis    Stroke-like symptoms    Benign essential hypertension    Chronic obstructive pulmonary disease    Chronic pain disorder    Diabetic peripheral neuropathy associated with type 2 diabetes mellitus    Gastroesophageal reflux disease without esophagitis    Hyperlipidemia    Hypothyroidism    CVA (cerebral vascular accident)     Past Medical History:   Diagnosis Date    Diabetes     History of transfusion     [facility], no reaction    Hypertension     RA (rheumatoid arthritis)      Past Surgical History:   Procedure Laterality Date    BLADDER SUSPENSION      HERNIA REPAIR      HYSTERECTOMY      MASTOID SURGERY      PAIN PUMP INSERTION/REVISION N/A 2024    Procedure: INTRATHECAL CATHETER AND PAIN PUMP IMPLANT;  Surgeon: Cristi Egan MD;  Location: [facility];  Service: Pain Management;  Laterality: N/A;      General Information       Row Name 25 0956          OT Time and Intention    Document Type evaluation (P)   -SJ     Mode of Treatment occupational therapy (P)   -SJ       Row Name 25 0956          General Information    Patient Profile Reviewed yes (P)   -SJ     Prior Level of Function -- (P)   pt poor historian; per chart review, pt independent with FWW and ADLs  -SJ     Existing Precautions/Restrictions fall;other (see comments) (P)   remote CVA with residual L weakness  -SJ     Barriers to Rehab  medically complex;previous functional deficit;cognitive status (P)   -       Row Name 05/02/25 0956          Living Environment    Current Living Arrangements apartment (P)   -     People in Home alone (P)   -       Row Name 05/02/25 0956          Home Main Entrance    Number of Stairs, Main Entrance other (see comments) (P)   limited historian  -       Row Name 05/02/25 0956          Cognition    Orientation Status (Cognition) oriented to;place;disoriented to;person;time (P)   -       Row Name 05/02/25 0956          Safety Issues/Impairments Affecting Functional Mobility    Safety Issues Affecting Function (Mobility) ability to follow commands;at risk behavior observed;awareness of need for assistance;insight into deficits/self-awareness;safety precaution awareness;safety precautions follow-through/compliance (P)   -     Impairments Affecting Function (Mobility) balance;cognition;strength;endurance/activity tolerance;postural/trunk control;pain (P)   -     Cognitive Impairments, Mobility Safety/Performance attention;awareness, need for assistance;insight into deficits/self-awareness;judgment;problem-solving/reasoning;safety precaution follow-through (P)   -               User Key  (r) = Recorded By, (t) = Taken By, (c) = Cosigned By      Initials Name Provider Type     Roslyn Montaño OT Student OT Student                     Mobility/ADL's       Row Name 05/02/25 0901          Bed Mobility    Bed Mobility rolling left;supine-sit (P)   -SJ     Rolling Left [place] (Bed Mobility) moderate assist (50% patient effort);2 person assist;verbal cues (P)   -     Supine-Sit [place] (Bed Mobility) moderate assist (50% patient effort);2 person assist;verbal cues (P)   -SJ     Bed Mobility, Safety Issues cognitive deficits limit understanding;decreased use of arms for pushing/pulling;decreased use of legs for bridging/pushing;impaired trunk control for bed mobility (P)   -     Assistive Device  (Bed Mobility) bed rails;head of bed elevated;repositioning sheet (P)   -[facility] Name 05/02/25 0958          Transfers    Transfers bed-chair transfer;sit-stand transfer;stand-sit transfer (P)   -SJ       Row Name 05/02/25 0958          Bed-Chair Transfer    Bed-Chair [place] (Transfers) moderate assist (50% patient effort);2 person assist;verbal cues (P)   -SJ     Comment, (Bed-Chair Transfer) Pt completed SPT with max cues for hand placement during transfer, pt unable to follow commands demonstrating poor safety awareness. (P)   -[facility] Name 05/02/25 0958          Sit-Stand Transfer    Sit-Stand [place] (Transfers) moderate assist (50% patient effort);2 person assist;verbal cues;nonverbal cues (demo/gesture) (P)   -SJ     Assistive Device (Sit-Stand Transfers) other (see comments) (P)   BUE support  -SJ       Row Name 05/02/25 0958          Stand-Sit Transfer    Stand-Sit [place] (Transfers) moderate assist (50% patient effort);2 person assist;verbal cues;nonverbal cues (demo/gesture) (P)   -SJ     Assistive Device (Stand-Sit Transfers) other (see comments) (P)   BUE support  -SJ       Row Name 05/02/25 0958          Activities of Daily Living    BADL Assessment/Intervention lower body dressing;feeding (P)   -SJ       Row Name 05/02/25 0958          Lower Body Dressing Assessment/Training    [place] Level (Lower Body Dressing) don;socks;dependent (less than 25% patient effort) (P)   -SJ     Position (Lower Body Dressing) supine (P)   -SJ       Row Name 05/02/25 0958          Self-Feeding Assessment/Training    [place] Level (Feeding) finger foods;supervision (P)   -SJ     Position (Feeding) supported sitting (P)   -               User Key  (r) = Recorded By, (t) = Taken By, (c) = Cosigned By      Initials Name Provider Type    Roslyn Bobby, OT Student OT Student                   Obj/Interventions       [facility] Name 05/02/25 1003          Sensory Assessment (Somatosensory)     Sensory Assessment (Somatosensory) unable/difficult to assess (P)   -SJ     Sensory Assessment Intermittent attention during assessment, difficult to assess. (P)   -SJ       Row Name 05/02/25 1003          Range of Motion Comprehensive    General Range of Motion bilateral upper extremity ROM WFL (P)   -SJ     Comment, General Range of Motion Assessed funcitonally (P)   -SJ       Row Name 05/02/25 1003          Strength Comprehensive (MMT)    Comment, General Manual Muscle Testing (MMT) Assessment Assessed functionally, BUE grossly 3+/5 (P)   -SJ       Row Name 05/02/25 1003          Balance    Balance Assessment sitting static balance;sitting dynamic balance;sit to stand dynamic balance;standing static balance;standing dynamic balance (P)   -SJ     Static Sitting Balance standby assist (P)   -SJ     Dynamic Sitting Balance contact guard (P)   -SJ     Position, Sitting Balance unsupported;sitting edge of bed;sitting in chair (P)   -SJ     Sit to Stand Dynamic Balance moderate assist;2-person assist (P)   -SJ     Static Standing Balance moderate assist;2-person assist (P)   -SJ     Dynamic Standing Balance moderate assist;2-person assist;non-verbal cues (demo/gesture);verbal cues (P)   -SJ     Position/Device Used, Standing Balance supported;other (see comments) (P)   BUE support  -SJ     Balance Interventions sitting;standing;sit to stand;dynamic;supported;static;minimal challenge;occupation based/functional task (P)   -SJ               User Key  (r) = Recorded By, (t) = Taken By, (c) = Cosigned By      Initials Name Provider Type    SJ Roslyn Montaño, OT Student OT Student                   Goals/Plan       Row Name 05/02/25 1012          Transfer Goal 1 (OT)    Activity/Assistive Device (Transfer Goal 1, OT) sit-to-stand/stand-to-sit;bed-to-chair/chair-to-bed;toilet (P)   -SJ     [place] Level/Cues Needed (Transfer Goal 1, OT) contact guard required (P)   -SJ     Time Frame (Transfer Goal 1, OT) 10  days;long term goal (LTG) (P)   -SJ     Progress/Outcome (Transfer Goal 1, OT) new goal (P)   -SJ       Row Name 05/02/25 1012          Dressing Goal 1 (OT)    Activity/Device (Dressing Goal 1, OT) lower body dressing (P)   -SJ     [place]/Cues Needed (Dressing Goal 1, OT) moderate assist (50-74% patient effort) (P)   -SJ     Time Frame (Dressing Goal 1, OT) 5 days;short term goal (STG) (P)   -SJ     Strategies/Barriers (Dressing Goal 1, OT) Sitting supported using figure-four method (P)   -SJ     Progress/Outcome (Dressing Goal 1, OT) new goal (P)   -SJ       Row Name 05/02/25 1012          Grooming Goal 1 (OT)    Activity/Device (Grooming Goal 1, OT) hair care;oral care;wash face, hands (P)   -SJ     [place] (Grooming Goal 1, OT) moderate assist (50-74% patient effort) (P)   -SJ     Time Frame (Grooming Goal 1, OT) short term goal (STG);5 days (P)   -SJ     Strategies/Barriers (Grooming Goal 1, OT) Standing supported at sink (P)   -SJ     Progress/Outcome (Grooming Goal 1, OT) new goal (P)   -SJ       Row Name 05/02/25 1012          Therapy Assessment/Plan (OT)    Planned Therapy Interventions (OT) activity tolerance training;BADL retraining;occupation/activity based interventions;patient/caregiver education/training;transfer/mobility retraining (P)   -SJ               User Key  (r) = Recorded By, (t) = Taken By, (c) = Cosigned By      Initials Name Provider Type     Roslyn Montaño, OT Student OT Student                   Clinical Impression       Row Name 05/02/25 1006          Pain Assessment    Pain Location back (P)   -SJ     Pain Side/Orientation posterior (P)   -SJ     Pain Management Interventions exercise or physical activity utilized;positioning techniques utilized;nursing notified (P)   -SJ     Response to Pain Interventions activity participation with tolerable pain (P)   -SJ       Row Name 05/02/25 1006          Pain Scale: FACES Pre/Post-Treatment    Pain: FACES Scale, Pretreatment  6-->hurts even more (P)   -SJ     Posttreatment Pain Rating 4-->hurts little more (P)   -       Row Name 05/02/25 1006          Plan of Care Review    Plan of Care Reviewed With patient (P)   -SJ     Outcome Evaluation Pt limited historian due to cognitive deficit. Pt presents with impaired cognition, balance deficits, generalized weakness, poor safety awareness, and decreased ADL participation warranting benefit of IPOT services. Pt engaged in feeding, LB dressing, and SPT to chair demonstrating poor command following req max cues for safety. Recommend SNF at d/c. (P)   -       Row Name 05/02/25 1006          Therapy Assessment/Plan (OT)    Patient/Family Therapy Goal Statement (OT) Return to baseline (P)   -SJ     Rehab Potential (OT) good (P)   -     Criteria for Skilled Therapeutic Interventions Met (OT) yes;skilled treatment is necessary (P)   -     Therapy Frequency (OT) daily (P)   -SJ     Predicted Duration of Therapy Intervention (OT) 10 days (P)   -[facility] Name 05/02/25 1006          Therapy Plan Review/Discharge Plan (OT)    Anticipated Discharge Disposition (OT) skilled nursing facility (P)   -       Row Name 05/02/25 1006          Vital Signs    Pre Systolic BP Rehab 166 (P)   -SJ     Pre Treatment Diastolic BP 71 (P)   -SJ     Post Systolic BP Rehab 162 (P)   -SJ     Post Treatment Diastolic BP 86 (P)   -SJ     Pretreatment Heart Rate (beats/min) 94 (P)   -SJ     Posttreatment Heart Rate (beats/min) 92 (P)   -SJ     Pre SpO2 (%) 97 (P)   -SJ     O2 Delivery Pre Treatment nasal cannula (P)   -SJ     Post SpO2 (%) 97 (P)   -SJ     O2 Delivery Post Treatment nasal cannula (P)   -SJ     Pre Patient Position Supine (P)   -SJ     Intra Patient Position Standing (P)   -SJ     Post Patient Position Sitting (P)   -[facility] Name 05/02/25 1006          Positioning and Restraints    Pre-Treatment Position in bed (P)   -SJ     Post Treatment Position chair (P)   -SJ     In Chair notified  nsg;reclined;sitting;call light within reach;encouraged to call for assist;exit alarm on;waffle cushion;on mechanical lift sling;heels elevated (P)   -SJ               User Key  (r) = Recorded By, (t) = Taken By, (c) = Cosigned By      Initials Name Provider Type    Roslyn Bobby, OT Student OT Student                   Outcome Measures       Row Name 05/02/25 1013          How much help from another is currently needed...    Putting on and taking off regular lower body clothing? 2 (P)   -SJ     Bathing (including washing, rinsing, and drying) 1 (P)   -SJ     Toileting (which includes using toilet bed pan or urinal) 1 (P)   -SJ     Putting on and taking off regular upper body clothing 2 (P)   -SJ     Taking care of personal grooming (such as brushing teeth) 3 (P)   -SJ     Eating meals 3 (P)   -SJ     AM-PAC 6 Clicks Score (OT) 12 (P)   -SJ       Row Name 05/02/25 0934 05/02/25 0810       How much help from another person do you currently need...    Turning from your back to your side while in flat bed without using bedrails? 3  -KR 2  -LM    Moving from lying on back to sitting on the side of a flat bed without bedrails? 2  -KR 2  -LM    Moving to and from a bed to a chair (including a wheelchair)? 2  -KR 1  -LM    Standing up from a chair using your arms (e.g., wheelchair, bedside chair)? 2  -KR 1  -LM    Climbing 3-5 steps with a railing? 1  -KR 1  -LM    To walk in hospital room? 2  -KR 1  -LM    AM-PAC 6 Clicks Score (PT) 12  -KR 8  -LM    Highest Level of Mobility Goal 4 --> Transfer to chair/commode  -KR 3 --> Sit at edge of bed  -LM      Row Name 05/02/25 1013 05/02/25 0934       Modified [place] Scale    Pre-Stroke Modified [place] Scale 6 - Unable to determine (UTD) from the medical record documentation (P)   -SJ 6 - Unable to determine (UTD) from the medical record documentation  -KR    Modified [place] Scale 4 - Moderately severe disability.  Unable to walk without assistance, and unable to attend to  own bodily needs without assistance. (P)   - 4 - Moderately severe disability.  Unable to walk without assistance, and unable to attend to own bodily needs without assistance.  -ADRY      Row Name 05/02/25 1013 05/02/25 0934       Functional Assessment    Outcome Measure Options AM-PAC 6 Clicks Daily Activity (OT);Modified [place] (P)   -SJ AM-PAC 6 Clicks Basic Mobility (PT);Modified [place]  -KR              User Key  (r) = Recorded By, (t) = Taken By, (c) = Cosigned By      Initials Name Provider Type    Li Corona, RN Registered Nurse    Evette Lopez, PT Physical Therapist    Roslyn Bobby, OT Student OT Student                    Occupational Therapy Education       Title: PT OT SLP Therapies (In Progress)       Topic: Occupational Therapy (In Progress)       Point: ADL training (Done)       Learning Progress Summary            Patient Acceptance, E, DU by  at 5/2/2025 0835                      Point: Precautions (Done)       Learning Progress Summary            Patient Acceptance, E, DU by  at 5/2/2025 0835                      Point: Body mechanics (Done)       Learning Progress Summary            Patient Acceptance, E, DU by  at 5/2/2025 0835                                      User Key       Initials Effective Dates Name Provider Type Discipline     02/13/25 -  Roslyn Montaño, OT Student OT Student OT                  OT Recommendation and Plan  Planned Therapy Interventions (OT): (P) activity tolerance training, BADL retraining, occupation/activity based interventions, patient/caregiver education/training, transfer/mobility retraining  Therapy Frequency (OT): (P) daily  Plan of Care Review  Plan of Care Reviewed With: (P) patient  Outcome Evaluation: (P) Pt limited historian due to cognitive deficit. Pt presents with impaired cognition, balance deficits, generalized weakness, poor safety awareness, and decreased ADL participation warranting benefit of IPOT services. Pt engaged  in feeding, LB dressing, and SPT to chair demonstrating poor command following req max cues for safety. Recommend SNF at d/c.     Time Calculation:   Evaluation Complexity (OT)  Review Occupational Profile/Medical/Therapy History Complexity: (P) expanded/moderate complexity  Assessment, Occupational Performance/Identification of Deficit Complexity: (P) 3-5 performance deficits  Clinical Decision Making Complexity (OT): (P) detailed assessment/moderate complexity  Overall Complexity of Evaluation (OT): (P) moderate complexity     Time Calculation- OT       Row Name 05/02/25 1015             Time Calculation- OT    OT Start Time 0835 (P)   -      OT Received On 05/02/25 (P)   -      OT Goal Re-Cert Due Date 05/12/25 (P)   -         Untimed Charges    OT Eval/Re-eval Minutes 47 (P)   -         Total Minutes    Untimed Charges Total Minutes 47 (P)   -       Total Minutes 47 (P)   -                User Key  (r) = Recorded By, (t) = Taken By, (c) = Cosigned By      Initials Name Provider Type     Roslyn Montaño, OT Student OT Student                  Therapy Charges for Today       Code Description Service Date Service Provider Modifiers Qty    [id number]  OT EVAL MOD COMPLEXITY 4 5/2/2025 Roslyn Montaño, OT Student GO 1                 Roslyn Montaño OT Student  5/2/2025

## 2025-05-02 NOTE — PROGRESS NOTES
[facility] Medicine Services  PROGRESS NOTE    Patient Name: Phoebe Call  : 1955  MRN: [id number]    Date of Admission: 2025  Primary Care Physician: Provider, No Known    Subjective   Subjective     CC:  Weakness    HPI:  Sitting in the chair but uncomfortable, more awake today      Objective   Objective     Vital Signs:   Temp:  [98.2 °F (36.8 °C)-99.6 °F (37.6 °C)] 98.2 °F (36.8 °C)  Heart Rate:  [77-91] 77  Resp:  [16-18] 16  BP: (112-166)/(49-91) 112/49  Flow (L/min) (Oxygen Therapy):  [3] 3     Physical Exam:  Constitutional: No acute distress, chronically ill-appearing  HENT: NCAT, mucous membranes moist  Respiratory: Respiratory effort normal   Cardiovascular: RRR, no murmurs, rubs, or gallops  Gastrointestinal: Soft, nontender, nondistended  Musculoskeletal: No bilateral ankle edema  Psychiatric: Appropriate affect, cooperative  Neurologic: Oriented x 2-3, speech mostly clear  Skin: No rashes      Results Reviewed:  LAB RESULTS:      Lab 25  0648 25  0735 25  1558 25  1555   WBC 7.65 7.49 7.95  --    HEMOGLOBIN 12.2 11.6* 12.3  --    HEMOGLOBIN, POC  --   --   --  12.9   HEMATOCRIT 39.6 38.3 37.7  --    HEMATOCRIT POC  --   --   --  38   PLATELETS 176 222 257  --    NEUTROS ABS 4.10 3.76 4.40  --    IMMATURE GRANS (ABS) 0.02 0.02 0.03  --    LYMPHS ABS 2.42 2.67 2.61  --    MONOS ABS 0.85 0.69 0.58  --    EOS ABS 0.23 0.31 0.30  --    MCV 88.8 89.7 84.2  --    PROTIME  --   --  14.3  --    APTT  --   --  37.9  --          Lab 25  0648 25  0932 25  0735 25  1653 25  1555   SODIUM 135* 138  --  138  --    POTASSIUM 4.4 4.5  --  4.0  --    CHLORIDE 97* 100  --  99  --    CO2 26.0 27.0  --  28.0  --    ANION GAP 12.0 11.0  --  11.0  --    BUN 11 11  --  9  --    CREATININE 0.33* 0.37*  --  0.35* 0.40*   EGFR 113.1 110.0  --  111.5 108.0   GLUCOSE 112* 113*  --  127*  --    CALCIUM 9.5 9.7  --  9.9  --    MAGNESIUM  --   1.8  --   --   --    PHOSPHORUS 2.8 3.6  --   --   --    HEMOGLOBIN A1C  --   --  7.20*  --   --          Lab 05/02/25  0648 05/01/25  0932 04/30/25  1653   TOTAL PROTEIN  --  6.6 7.2   ALBUMIN 3.7 4.0 4.1   GLOBULIN  --  2.6 3.1   ALT (SGPT)  --  14 8   AST (SGOT)  --  22 28   BILIRUBIN  --  0.5 0.4   ALK PHOS  --  160* 167*         Lab 04/30/25  1558   PROTIME 14.3   INR 1.05         Lab 05/01/25  0932   CHOLESTEROL 96   LDL CHOL 46   HDL CHOL 32*   TRIGLYCERIDES 89             Brief Urine Lab Results  (Last result in the past 365 days)        Color   Clarity   Blood   Leuk Est   Nitrite   Protein   CREAT   Urine HCG        05/01/25 2114 Yellow   Clear   Negative   Moderate (2+)   Negative   Trace                   Microbiology Results Abnormal       None            CT Head Without Contrast  Result Date: 5/2/2025  CT HEAD WO CONTRAST Date of Exam: 5/2/2025 11:49 AM EDT Indication: stroke follow up, unable to obtain MRI. Comparison: April 30, 2025 Technique: Axial CT images were obtained of the head without contrast administration.  Automated exposure control and iterative construction methods were used. Findings: No acute intracranial hemorrhage or extra-axial collection is identified. The ventricles appear normal in caliber, with no evidence of mass effect or midline shift. The basal cisterns appear patent. The gray-white differentiation appears preserved. The calvarium appear intact. There is mild paranasal sinus mucosal disease. The mastoid air cells are well-aerated. Degenerative changes appear stable.     Impression: Impression: 1.No acute intracranial process identified. 2.Mild paranasal sinus mucosal disease. Electronically Signed: Cristi Be MD  5/2/2025 12:16 PM EDT  Workstation ID: [id number]    XR Chest 1 View  Result Date: 4/30/2025  XR CHEST 1 VW Date of Exam: 4/30/2025 4:52 PM EDT Indication: Acute Stroke Protocol (onset < 12 hrs) Comparison: CXR 10/15/2012 Findings: The heart is normal in size.  The lungs are well-expanded. Subsegmental atelectasis and/or linear fibrosis is seen at the right lung base. No consolidation or mass is seen. The left lung apex is obscured by facial structures. Bony structures appear intact.     Impression: Impression: Subsegmental atelectasis and/are linear fibrosis is seen at the right lung base. Electronically Signed: Bj Champagne MD  4/30/2025 5:21 PM EDT  Workstation ID: [id number]    CT Angiogram Head w AI Analysis of LVO  Result Date: 4/30/2025  CT ANGIOGRAM HEAD W AI ANALYSIS OF LVO, CT ANGIOGRAM NECK Date of Exam: 4/30/2025 4:28 PM EDT Indication: Neuro Deficit, acute, Stroke suspected Neuro deficit, acute stroke suspected. Comparison: None available. Technique: CTA of the head and neck was performed after the uneventful intravenous administration of 90 mL Isovue-370. Reconstructed coronal and sagittal images were also obtained. In addition, a 3-D volume rendered image was created for interpretation. Automated exposure control and iterative reconstruction methods were used. Findings: The lung apices are clear. Evaluation of the neck soft tissues demonstrates no evidence of aerodigestive tract mass or pathologic cervical lymphadenopathy. Multilevel cervical spondylosis is present, without evidence of acute fracture or aggressive osseous lesion. Patent aortic arch with typical three-vessel branching. The subclavian arteries are patent bilaterally. Some calcific atherosclerotic change is present at the right ICA origin with mild, less than 50% stenosis present by NASCET criteria. Similar mild narrowing is present on the left. The vertebral arteries are normal in course and caliber bilaterally. Intracranially, the carotid siphons demonstrate calcific atherosclerotic changes with some mild to moderate narrowing of the cavernous and  ophthalmic segments. The anterior cerebral arteries appear patent. The right and left middle cerebral arteries demonstrate no evidence of  high-grade stenosis or occlusion. There is a saccular aneurysm present involving the left MCA bifurcation, inferiorly projecting and measuring 3 mm at the neck, 5 mm in greatest transverse dimension and 7 mm neck to dome. The vertebrobasilar system is patent. The posterior cerebral arteries are normal in course and caliber bilaterally.     Impression: Impression: Mild cervical and intracranial atherosclerotic changes are present as above, without evidence of associated high-grade stenosis or large vessel occlusion. 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 Electronically Signed: Barney Valle MD  4/30/2025 4:51 PM EDT  Workstation ID: [id number]    CT Angiogram Neck  Result Date: 4/30/2025  CT ANGIOGRAM HEAD W AI ANALYSIS OF LVO, CT ANGIOGRAM NECK Date of Exam: 4/30/2025 4:28 PM EDT Indication: Neuro Deficit, acute, Stroke suspected Neuro deficit, acute stroke suspected. Comparison: None available. Technique: CTA of the head and neck was performed after the uneventful intravenous administration of 90 mL Isovue-370. Reconstructed coronal and sagittal images were also obtained. In addition, a 3-D volume rendered image was created for interpretation. Automated exposure control and iterative reconstruction methods were used. Findings: The lung apices are clear. Evaluation of the neck soft tissues demonstrates no evidence of aerodigestive tract mass or pathologic cervical lymphadenopathy. Multilevel cervical spondylosis is present, without evidence of acute fracture or aggressive osseous lesion. Patent aortic arch with typical three-vessel branching. The subclavian arteries are patent bilaterally. Some calcific atherosclerotic change is present at the right ICA origin with mild, less than 50% stenosis present by NASCET criteria. Similar mild narrowing is present on the left. The vertebral  arteries are normal in course and caliber bilaterally. Intracranially, the carotid siphons demonstrate calcific atherosclerotic changes with some mild to moderate narrowing of the cavernous and  ophthalmic segments. The anterior cerebral arteries appear patent. The right and left middle cerebral arteries demonstrate no evidence of high-grade stenosis or occlusion. There is a saccular aneurysm present involving the left MCA bifurcation, inferiorly projecting and measuring 3 mm at the neck, 5 mm in greatest transverse dimension and 7 mm neck to dome. The vertebrobasilar system is patent. The posterior cerebral arteries are normal in course and caliber bilaterally.     Impression: Impression: Mild cervical and intracranial atherosclerotic changes are present as above, without evidence of associated high-grade stenosis or large vessel occlusion. 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 Electronically Signed: Barney Valle MD  4/30/2025 4:51 PM EDT  Workstation ID: [id number]    CT Head Without Contrast Stroke Protocol  Result Date: 4/30/2025  CT HEAD WO CONTRAST STROKE PROTOCOL Date of Exam: 4/30/2025 3:48 PM EDT Indication: Neuro deficit, acute, stroke suspected Neuro Deficit, acute, Stroke suspected. Right arm weakness Comparison: None available. Technique: Axial CT images were obtained of the head without contrast administration.  Reconstructed coronal images were also obtained. Automated exposure control and iterative construction methods were used. Scan Time: 1619 hours Results discussed with Viktoria at 1645 hours. Findings: The ventricles are mildly and diffusely prominent. Sulci and cerebellar folia are not abnormally prominent. Ill-defined diminished density in cerebral white matter is consistent with mild gliosis and/or scattered old lacunar infarcts. No diminished density is seen  in immediate periventricular white matter to suggest transependymal CSF. There is no CT evidence of acute intracranial hemorrhage, mass, or mass effect. No abnormality of the orbits is evident. The paranasal sinuses are well aerated. Mild mucosal thickening is seen in the left maxillary antrum. Nasal antral windows are widely patent. The middle ears are well aerated. Mastoidectomy defect on the left is well aerated.     Impression: Impression: CT scan of the head without IV contrast demonstrating mild prominence of the ventricles which may be due to atrophy or mild ventricular hypertension. Mild white matter changes consistent with gliosis and/or scattered old lacunar infarcts. No acute intracranial abnormality is seen. Electronically Signed: Bj Champagne MD  4/30/2025 4:48 PM EDT  Workstation ID: [id number]          Current medications:  Scheduled Meds:allopurinol, 100 mg, Oral, BID  atorvastatin, 40 mg, Oral, Nightly  baclofen, 10 mg, Oral, TID  busPIRone, 30 mg, Oral, BID  clopidogrel, 75 mg, Oral, Daily  clotrimazole-betamethasone, , Topical, Q12H  fluconazole, 100 mg, Oral, Q24H  gabapentin, 600 mg, Oral, Q8H  insulin lispro, 2-9 Units, Subcutaneous, 4x Daily AC & at Bedtime  rOPINIRole, 1 mg, Oral, TID  sodium chloride, 10 mL, Intravenous, Q12H  sodium chloride, 10 mL, Intravenous, Q12H      Continuous Infusions:   PRN Meds:.  acetaminophen    ALPRAZolam    aluminum-magnesium hydroxide-simethicone    senna-docusate sodium **AND** polyethylene glycol **AND** bisacodyl **AND** bisacodyl    Calcium Replacement - Follow Nurse / BPA Driven Protocol    dextrose    dextrose    diphenhydrAMINE    glucagon (human recombinant)    Magnesium Standard Dose Replacement - Follow Nurse / BPA Driven Protocol    melatonin    nitroglycerin    ondansetron    Phosphorus Replacement - Follow Nurse / BPA Driven Protocol    Potassium Replacement - Follow Nurse / BPA Driven Protocol    sodium chloride    sodium chloride    sodium  chloride    sodium chloride    sodium chloride    Assessment & Plan   Assessment & Plan     Active Hospital Problems    Diagnosis  POA    **Stroke-like symptoms [R29.90]  Yes    CVA (cerebral vascular accident) [I63.9]  Yes    Chronic pain disorder [G89.4]  Yes    Diabetic peripheral neuropathy associated with type 2 diabetes mellitus [E11.42]  Yes    Gastroesophageal reflux disease without esophagitis [K21.9]  Yes    Hyperlipidemia [E78.5]  Yes    Hypothyroidism [E03.9]  Yes    Benign essential hypertension [I10]  Yes    Chronic obstructive pulmonary disease [J44.9]  Yes      Resolved Hospital Problems   No resolved problems to display.        Brief Hospital Course to date:  Phoebe Call is a 70 y.o. female with history of hypertension, type 2 diabetes, prior cancer in 2000, remote CVA admitted for right-sided weakness and slurred speech.  She was evaluated by stroke neurology, unable to have MRI due to pain pump.     TIA/CVA  History of right hemispheric stroke with residual left sided weakness   Left MCA aneurysm 3mm x 5mm x 7mm; incidental  - Right sided weakness/numbness and dysarthria, still weaker than left  - MRI brain cannot be obtained 2/2 kyposis and pain pump, consider repeat CT head if any other change in mental status  -DCed Dilaudid  - Echo pending     - Continue Plavix 75mg for now, patient cannot have ASA 2/2 hives  - PT/OT recommending rehab but plan is home  - Will need to follow-up with neurosurgery outpatient for incidental aneurysm   - Neurology evaluated     Essential hypertension  - Not on meds        HLD  - Chronic, continue statin     Diabetes Mellitus type 2     COPD  - Chronic, not in exaberbation  - Duonebs PRN     Severe LBP  Hx of surgical reconstruction, pain pump implantation  - Chronic  - Pain control PRN    UA concerning for yeast  - Add Diflucan x 3 days  -Culture pending       Expected Discharge Location and Transportation: Home  Expected Discharge   Expected Discharge Date:  5/5/2025; Expected Discharge Time:      VTE Prophylaxis:  Mechanical VTE prophylaxis orders are present.         AM-PAC 6 Clicks Score (PT): 12 (05/02/25 2829)    CODE STATUS:   Code Status and Medical Interventions: CPR (Attempt to Resuscitate); Full Support   Ordered at: 04/30/25 2036     Code Status (Patient has no pulse and is not breathing):    CPR (Attempt to Resuscitate)     Medical Interventions (Patient has pulse or is breathing):    Full Support     Level Of Support Discussed With:    Patient       Maricel Parker Cuauhtemoc, DO  05/02/25

## 2025-05-03 ENCOUNTER — APPOINTMENT (OUTPATIENT)
Dept: CARDIOLOGY | Facility: HOSPITAL | Age: 70
DRG: 065 | End: 2025-05-03
Payer: MEDICARE

## 2025-05-03 LAB
AORTIC DIMENSIONLESS INDEX: 0.91 (DI)
AV HCM GRAD VALS: 19 MMHG
AV LVOT PEAK GRADIENT: 10 MMHG
AV MEAN PRESS GRAD SYS DOP V1V2: 7.3 MMHG
AV VMAX SYS DOP: 175.7 CM/SEC
BACTERIA SPEC AEROBE CULT: NORMAL
BH CV ECHO MEAS - AO MAX PG: 12.4 MMHG
BH CV ECHO MEAS - AO ROOT DIAM: 3.2 CM
BH CV ECHO MEAS - AO V2 VTI: 41.5 CM
BH CV ECHO MEAS - AVA(I,D): 2.9 CM2
BH CV ECHO MEAS - EDV(CUBED): 54.9 ML
BH CV ECHO MEAS - EDV(MOD-SP2): 87.7 ML
BH CV ECHO MEAS - EDV(MOD-SP4): 105 ML
BH CV ECHO MEAS - EF(MOD-SP2): 77 %
BH CV ECHO MEAS - EF(MOD-SP4): 59.9 %
BH CV ECHO MEAS - ESV(CUBED): 5.8 ML
BH CV ECHO MEAS - ESV(MOD-SP2): 20.2 ML
BH CV ECHO MEAS - ESV(MOD-SP4): 42.1 ML
BH CV ECHO MEAS - FS: 52.6 %
BH CV ECHO MEAS - IVS/LVPW: 1.22 CM
BH CV ECHO MEAS - IVSD: 1.1 CM
BH CV ECHO MEAS - LA DIMENSION: 4.3 CM
BH CV ECHO MEAS - LAT PEAK E' VEL: 7.3 CM/SEC
BH CV ECHO MEAS - LV DIASTOLIC VOL/BSA (35-75): 65.5 CM2
BH CV ECHO MEAS - LV MASS(C)D: 117.3 GRAMS
BH CV ECHO MEAS - LV MAX PG: 9.5 MMHG
BH CV ECHO MEAS - LV MEAN PG: 5.7 MMHG
BH CV ECHO MEAS - LV SYSTOLIC VOL/BSA (12-30): 26.3 CM2
BH CV ECHO MEAS - LV V1 MAX: 153.7 CM/SEC
BH CV ECHO MEAS - LV V1 VTI: 37.8 CM
BH CV ECHO MEAS - LVIDD: 3.8 CM
BH CV ECHO MEAS - LVIDS: 1.8 CM
BH CV ECHO MEAS - LVOT AREA: 3.1 CM2
BH CV ECHO MEAS - LVOT DIAM: 2 CM
BH CV ECHO MEAS - LVPWD: 0.9 CM
BH CV ECHO MEAS - MED PEAK E' VEL: 5.1 CM/SEC
BH CV ECHO MEAS - MV A MAX VEL: 127.5 CM/SEC
BH CV ECHO MEAS - MV DEC SLOPE: 232 CM/SEC2
BH CV ECHO MEAS - MV DEC TIME: 0.39 SEC
BH CV ECHO MEAS - MV E MAX VEL: 89.6 CM/SEC
BH CV ECHO MEAS - MV E/A: 0.7
BH CV ECHO MEAS - MV MAX PG: 8.8 MMHG
BH CV ECHO MEAS - MV MEAN PG: 3 MMHG
BH CV ECHO MEAS - MV P1/2T: 127.5 MSEC
BH CV ECHO MEAS - MV V2 VTI: 47.6 CM
BH CV ECHO MEAS - MVA(P1/2T): 1.73 CM2
BH CV ECHO MEAS - MVA(VTI): 2.49 CM2
BH CV ECHO MEAS - PA ACC TIME: 0.14 SEC
BH CV ECHO MEAS - PA V2 MAX: 130.5 CM/SEC
BH CV ECHO MEAS - PAPD(PI EDV): 4 MMHG
BH CV ECHO MEAS - PI END-D VEL: 104 CM/SEC
BH CV ECHO MEAS - RAP SYSTOLE: 8 MMHG
BH CV ECHO MEAS - RVSP: 33 MMHG
BH CV ECHO MEAS - SV(LVOT): 118.6 ML
BH CV ECHO MEAS - SV(MOD-SP2): 67.5 ML
BH CV ECHO MEAS - SV(MOD-SP4): 62.9 ML
BH CV ECHO MEAS - SVI(LVOT): 74 ML/M2
BH CV ECHO MEAS - SVI(MOD-SP2): 42.1 ML/M2
BH CV ECHO MEAS - SVI(MOD-SP4): 39.2 ML/M2
BH CV ECHO MEAS - TAPSE (>1.6): 2.29 CM
BH CV ECHO MEAS - TR MAX PG: 25.4 MMHG
BH CV ECHO MEAS - TR MAX VEL: 252 CM/SEC
BH CV ECHO MEASUREMENTS AVERAGE E/E' RATIO: 14.45
BH CV ECHO SHUNT ASSESSMENT PERFORMED (HIDDEN SCRIPTING): 1
BH CV XLRA - RV BASE: 4 CM
BH CV XLRA - RV LENGTH: 7 CM
BH CV XLRA - RV MID: 3.4 CM
BH CV XLRA - TDI S': 18.1 CM/SEC
GLUCOSE BLDC GLUCOMTR-MCNC: 155 MG/DL (ref 70–130)
GLUCOSE BLDC GLUCOMTR-MCNC: 165 MG/DL (ref 70–130)
GLUCOSE BLDC GLUCOMTR-MCNC: 167 MG/DL (ref 70–130)
GLUCOSE BLDC GLUCOMTR-MCNC: 175 MG/DL (ref 70–130)
IVRT: 63 MS
LEFT ATRIUM VOLUME INDEX: 36.8 ML/M2
LV EF 2D ECHO EST: 70 %
LV EF BIPLANE MOD: 70.5 %

## 2025-05-03 PROCEDURE — 93005 ELECTROCARDIOGRAM TRACING: CPT | Performed by: INTERNAL MEDICINE

## 2025-05-03 PROCEDURE — 93306 TTE W/DOPPLER COMPLETE: CPT | Performed by: INTERNAL MEDICINE

## 2025-05-03 PROCEDURE — 99232 SBSQ HOSP IP/OBS MODERATE 35: CPT | Performed by: INTERNAL MEDICINE

## 2025-05-03 PROCEDURE — 99232 SBSQ HOSP IP/OBS MODERATE 35: CPT

## 2025-05-03 PROCEDURE — 93010 ELECTROCARDIOGRAM REPORT: CPT | Performed by: INTERNAL MEDICINE

## 2025-05-03 PROCEDURE — 93306 TTE W/DOPPLER COMPLETE: CPT

## 2025-05-03 PROCEDURE — 82948 REAGENT STRIP/BLOOD GLUCOSE: CPT

## 2025-05-03 PROCEDURE — 63710000001 INSULIN LISPRO (HUMAN) PER 5 UNITS: Performed by: STUDENT IN AN ORGANIZED HEALTH CARE EDUCATION/TRAINING PROGRAM

## 2025-05-03 RX ADMIN — BACLOFEN 10 MG: 10 TABLET ORAL at 09:28

## 2025-05-03 RX ADMIN — Medication 10 ML: at 09:24

## 2025-05-03 RX ADMIN — FLUCONAZOLE 100 MG: 100 TABLET ORAL at 09:20

## 2025-05-03 RX ADMIN — ALLOPURINOL 100 MG: 100 TABLET ORAL at 09:20

## 2025-05-03 RX ADMIN — BACLOFEN 10 MG: 10 TABLET ORAL at 20:11

## 2025-05-03 RX ADMIN — ROPINIROLE 1 MG: 1 TABLET, FILM COATED ORAL at 09:20

## 2025-05-03 RX ADMIN — ACETAMINOPHEN 650 MG: 325 TABLET ORAL at 05:38

## 2025-05-03 RX ADMIN — BUSPIRONE HYDROCHLORIDE 30 MG: 10 TABLET ORAL at 20:11

## 2025-05-03 RX ADMIN — CLOTRIMAZOLE AND BETAMETHASONE DIPROPIONATE: 10; .5 CREAM TOPICAL at 20:12

## 2025-05-03 RX ADMIN — BACLOFEN 10 MG: 10 TABLET ORAL at 14:26

## 2025-05-03 RX ADMIN — ALLOPURINOL 100 MG: 100 TABLET ORAL at 20:11

## 2025-05-03 RX ADMIN — INSULIN LISPRO 2 UNITS: 100 INJECTION, SOLUTION INTRAVENOUS; SUBCUTANEOUS at 09:19

## 2025-05-03 RX ADMIN — Medication 10 ML: at 20:12

## 2025-05-03 RX ADMIN — INSULIN LISPRO 2 UNITS: 100 INJECTION, SOLUTION INTRAVENOUS; SUBCUTANEOUS at 12:31

## 2025-05-03 RX ADMIN — GABAPENTIN 600 MG: 300 CAPSULE ORAL at 05:35

## 2025-05-03 RX ADMIN — BUSPIRONE HYDROCHLORIDE 30 MG: 10 TABLET ORAL at 09:20

## 2025-05-03 RX ADMIN — ROPINIROLE 1 MG: 1 TABLET, FILM COATED ORAL at 17:19

## 2025-05-03 RX ADMIN — ATORVASTATIN CALCIUM 40 MG: 40 TABLET, FILM COATED ORAL at 20:11

## 2025-05-03 RX ADMIN — CLOPIDOGREL BISULFATE 75 MG: 75 TABLET, FILM COATED ORAL at 09:20

## 2025-05-03 RX ADMIN — GABAPENTIN 600 MG: 300 CAPSULE ORAL at 14:26

## 2025-05-03 RX ADMIN — GABAPENTIN 600 MG: 300 CAPSULE ORAL at 22:14

## 2025-05-03 RX ADMIN — CLOTRIMAZOLE AND BETAMETHASONE DIPROPIONATE: 10; .5 CREAM TOPICAL at 09:30

## 2025-05-03 RX ADMIN — INSULIN LISPRO 2 UNITS: 100 INJECTION, SOLUTION INTRAVENOUS; SUBCUTANEOUS at 20:11

## 2025-05-03 RX ADMIN — ROPINIROLE 1 MG: 1 TABLET, FILM COATED ORAL at 20:11

## 2025-05-03 RX ADMIN — INSULIN LISPRO 2 UNITS: 100 INJECTION, SOLUTION INTRAVENOUS; SUBCUTANEOUS at 17:19

## 2025-05-03 NOTE — PROGRESS NOTES
[facility] Medicine Services  PROGRESS NOTE    Patient Name: Phoebe Call  : 1955  MRN: [id number]    Date of Admission: 2025  Primary Care Physician: Provider, No Known    Subjective   Subjective     CC:  Weakness    HPI:  More awake, having difficulty using right side to eat but trying      Objective   Objective     Vital Signs:   Temp:  [97.6 °F (36.4 °C)-98.4 °F (36.9 °C)] 98.3 °F (36.8 °C)  Heart Rate:  [65-88] 65  Resp:  [16-18] 18  BP: (118-174)/(56-74) 118/59  Flow (L/min) (Oxygen Therapy):  [3] 3     Physical Exam:  Constitutional: No acute distress, chronically ill-appearing  HENT: NCAT, mucous membranes moist  Respiratory: Respiratory effort normal   Musculoskeletal: No bilateral ankle edema  Psychiatric: Appropriate affect, cooperative  Neurologic: Oriented x 3, speech clear  Skin: No rashes      Results Reviewed:  LAB RESULTS:      Lab 25  0648 25  0735 25  1558 25  1555   WBC 7.65 7.49 7.95  --    HEMOGLOBIN 12.2 11.6* 12.3  --    HEMOGLOBIN, POC  --   --   --  12.9   HEMATOCRIT 39.6 38.3 37.7  --    HEMATOCRIT POC  --   --   --  38   PLATELETS 176 222 257  --    NEUTROS ABS 4.10 3.76 4.40  --    IMMATURE GRANS (ABS) 0.02 0.02 0.03  --    LYMPHS ABS 2.42 2.67 2.61  --    MONOS ABS 0.85 0.69 0.58  --    EOS ABS 0.23 0.31 0.30  --    MCV 88.8 89.7 84.2  --    PROTIME  --   --  14.3  --    APTT  --   --  37.9  --          Lab 25  0648 25  0932 25  0735 25  1653 25  1555   SODIUM 135* 138  --  138  --    POTASSIUM 4.4 4.5  --  4.0  --    CHLORIDE 97* 100  --  99  --    CO2 26.0 27.0  --  28.0  --    ANION GAP 12.0 11.0  --  11.0  --    BUN 11 11  --  9  --    CREATININE 0.33* 0.37*  --  0.35* 0.40*   EGFR 113.1 110.0  --  111.5 108.0   GLUCOSE 112* 113*  --  127*  --    CALCIUM 9.5 9.7  --  9.9  --    MAGNESIUM  --  1.8  --   --   --    PHOSPHORUS 2.8 3.6  --   --   --    HEMOGLOBIN A1C  --   --  7.20*  --   --           Lab 05/02/25  0648 05/01/25  0932 04/30/25  1653   TOTAL PROTEIN  --  6.6 7.2   ALBUMIN 3.7 4.0 4.1   GLOBULIN  --  2.6 3.1   ALT (SGPT)  --  14 8   AST (SGOT)  --  22 28   BILIRUBIN  --  0.5 0.4   ALK PHOS  --  160* 167*         Lab 04/30/25  1558   PROTIME 14.3   INR 1.05         Lab 05/01/25  0932   CHOLESTEROL 96   LDL CHOL 46   HDL CHOL 32*   TRIGLYCERIDES 89             Brief Urine Lab Results  (Last result in the past 365 days)        Color   Clarity   Blood   Leuk Est   Nitrite   Protein   CREAT   Urine HCG        05/01/25 2114 Yellow   Clear   Negative   Moderate (2+)   Negative   Trace                   Microbiology Results Abnormal       None            CT Head Without Contrast  Result Date: 5/2/2025  CT HEAD WO CONTRAST Date of Exam: 5/2/2025 11:49 AM EDT Indication: stroke follow up, unable to obtain MRI. Comparison: April 30, 2025 Technique: Axial CT images were obtained of the head without contrast administration.  Automated exposure control and iterative construction methods were used. Findings: No acute intracranial hemorrhage or extra-axial collection is identified. The ventricles appear normal in caliber, with no evidence of mass effect or midline shift. The basal cisterns appear patent. The gray-white differentiation appears preserved. The calvarium appear intact. There is mild paranasal sinus mucosal disease. The mastoid air cells are well-aerated. Degenerative changes appear stable.     Impression: Impression: 1.No acute intracranial process identified. 2.Mild paranasal sinus mucosal disease. Electronically Signed: Cristi Be MD  5/2/2025 12:16 PM EDT  Workstation ID: [id number]          Current medications:  Scheduled Meds:allopurinol, 100 mg, Oral, BID  atorvastatin, 40 mg, Oral, Nightly  baclofen, 10 mg, Oral, TID  busPIRone, 30 mg, Oral, BID  clopidogrel, 75 mg, Oral, Daily  clotrimazole-betamethasone, , Topical, Q12H  gabapentin, 600 mg, Oral, Q8H  insulin lispro, 2-9 Units,  Subcutaneous, 4x Daily AC & at Bedtime  rOPINIRole, 1 mg, Oral, TID  sodium chloride, 10 mL, Intravenous, Q12H  sodium chloride, 10 mL, Intravenous, Q12H      Continuous Infusions:   PRN Meds:.  acetaminophen    ALPRAZolam    aluminum-magnesium hydroxide-simethicone    senna-docusate sodium **AND** polyethylene glycol **AND** bisacodyl **AND** bisacodyl    Calcium Replacement - Follow Nurse / BPA Driven Protocol    dextrose    dextrose    diphenhydrAMINE    glucagon (human recombinant)    Magnesium Standard Dose Replacement - Follow Nurse / BPA Driven Protocol    melatonin    nitroglycerin    ondansetron    Phosphorus Replacement - Follow Nurse / BPA Driven Protocol    Potassium Replacement - Follow Nurse / BPA Driven Protocol    sodium chloride    sodium chloride    sodium chloride    sodium chloride    sodium chloride    Assessment & Plan   Assessment & Plan     Active Hospital Problems    Diagnosis  POA    **Stroke-like symptoms [R29.90]  Yes    CVA (cerebral vascular accident) [I63.9]  Yes    Chronic pain disorder [G89.4]  Yes    Diabetic peripheral neuropathy associated with type 2 diabetes mellitus [E11.42]  Yes    Gastroesophageal reflux disease without esophagitis [K21.9]  Yes    Hyperlipidemia [E78.5]  Yes    Hypothyroidism [E03.9]  Yes    Benign essential hypertension [I10]  Yes    Chronic obstructive pulmonary disease [J44.9]  Yes      Resolved Hospital Problems   No resolved problems to display.        Brief Hospital Course to date:  Phoebe Call is a 70 y.o. female with history of hypertension, type 2 diabetes, prior cancer in 2000, remote CVA admitted for right-sided weakness and slurred speech.  She was evaluated by stroke neurology, unable to have MRI due to pain pump.     TIA/CVA  History of right hemispheric stroke with residual left sided weakness   Left MCA aneurysm 3mm x 5mm x 7mm; incidental  - Right sided weakness/numbness and dysarthria, still weaker than left.  Concerning for left  hemispheric infarct  - MRI brain cannot be obtained 2/2 kyposis and pain pump, repeat CT head unchanged  .-DCed Dilaudid  - Echo complete but read pending  - Continue Plavix 75mg for now, patient cannot have ASA 2/2 hives  - PT/OT recommending rehab but plan is home  - Will need to follow-up with neurosurgery outpatient for incidental aneurysm   - Neurology evaluated     Essential hypertension  - Not on meds     HLD  - Chronic, continue statin     Diabetes Mellitus type 2     COPD  - Chronic, not in exaberbation  - Duonebs PRN     Severe LBP  Hx of surgical reconstruction, pain pump implantation  - Chronic  - Pain control PRN     UA concerning for yeast  - Received 2 doses of Diflucan for initial urine showing yeast  -Culture with mixed dhiraj, no antibiotics or antifungals for now    Expected Discharge Location and Transportation: Home tomorrow  Expected Discharge   Expected Discharge Date: 5/5/2025; Expected Discharge Time:      VTE Prophylaxis:  Mechanical VTE prophylaxis orders are present.         AM-PAC 6 Clicks Score (PT): 12 (05/02/25 6691)    CODE STATUS:   Code Status and Medical Interventions: CPR (Attempt to Resuscitate); Full Support   Ordered at: 04/30/25 2036     Code Status (Patient has no pulse and is not breathing):    CPR (Attempt to Resuscitate)     Medical Interventions (Patient has pulse or is breathing):    Full Support     Level Of Support Discussed With:    Patient       Maricel Perezcarlos Posadas,   05/03/25

## 2025-05-03 NOTE — CONSULTS
I visited this patient to assist in advance care planning. The patient appears to me to be too confused today to engage in advance care planning. We will continue to follow. Please re-consult if an urgent need arises.

## 2025-05-03 NOTE — NURSING NOTE
Pt d/o to time but more alert, NIH=9, 3LNC, NSR on tele, c/o pain minimal this shift. At least 50% of all meals consumed. Refused transfer to chair. Turned q2hrs, safety precautions in place, care ongoing.

## 2025-05-03 NOTE — PROGRESS NOTES
Stroke Progress Note       Chief Complaint: Right-sided weakness and confusion    Subjective    Subjective     Subjective:  Resting in bed eating breakfast in no acute distress.  Currently no family is present at bedside.  Patient has no new complaints or new strokelike symptoms.  No reported events during the night.    Review of Systems   Constitutional:  Negative for fever.   HENT:  Negative for trouble swallowing and voice change.    Eyes:  Negative for visual disturbance.   Respiratory:  Negative for shortness of breath.    Cardiovascular:  Negative for chest pain.   Gastrointestinal:  Negative for abdominal pain and nausea.   Neurological:  Positive for weakness. Negative for facial asymmetry, speech difficulty, numbness and headaches.   Psychiatric/Behavioral:  Positive for confusion.       Constitutional: No fatigue      Objective      Temp:  [97.6 °F (36.4 °C)-98.4 °F (36.9 °C)] 98.4 °F (36.9 °C)  Heart Rate:  [68-88] 88  Resp:  [16-18] 18  BP: (112-174)/(49-74) 149/71    Objective    GEN: lying in bed; in NAD  HENT: normocephalic, non-erythematous oropharynx    NEURO: The patient is kyphotic  Mental Status: A&O x 2, interactive, able to follow commands  Speech: Intact Articulation, soft-spoken  CN 2-12:  II - PERRLA  III, IV, VI - EOMI  VII -no gross facial asymmetry  XII - Tongue protrudes midline    Motor:   RUE 4/5  LUE 4/5  RLE 5/5  LLE 5/5  Sensory: Normal sensation equal  Coordination: Unable to perform  Gait/Station: deferred     Results Review:    I reviewed the patient's new clinical results.    WBC   Date Value Ref Range Status   05/02/2025 7.65 3.40 - 10.80 10*3/mm3 Final     RBC   Date Value Ref Range Status   05/02/2025 4.46 3.77 - 5.28 10*6/mm3 Final     Hemoglobin   Date Value Ref Range Status   05/02/2025 12.2 12.0 - 15.9 g/dL Final     Hematocrit   Date Value Ref Range Status   05/02/2025 39.6 34.0 - 46.6 % Final     MCV   Date Value Ref Range Status   05/02/2025 88.8 79.0 - 97.0 fL Final      MCH   Date Value Ref Range Status   05/02/2025 27.4 26.6 - 33.0 pg Final     MCHC   Date Value Ref Range Status   05/02/2025 30.8 (L) 31.5 - 35.7 g/dL Final     RDW   Date Value Ref Range Status   05/02/2025 15.7 (H) 12.3 - 15.4 % Final     RDW-SD   Date Value Ref Range Status   05/02/2025 51.2 37.0 - 54.0 fl Final     MPV   Date Value Ref Range Status   05/02/2025 10.7 6.0 - 12.0 fL Final     Platelets   Date Value Ref Range Status   05/02/2025 176 140 - 450 10*3/mm3 Final     Neutrophil %   Date Value Ref Range Status   05/02/2025 53.6 42.7 - 76.0 % Final     Lymphocyte %   Date Value Ref Range Status   05/02/2025 31.6 19.6 - 45.3 % Final     Monocyte %   Date Value Ref Range Status   05/02/2025 11.1 5.0 - 12.0 % Final     Eosinophil %   Date Value Ref Range Status   05/02/2025 3.0 0.3 - 6.2 % Final     Basophil %   Date Value Ref Range Status   05/02/2025 0.4 0.0 - 1.5 % Final     Immature Grans %   Date Value Ref Range Status   05/02/2025 0.3 0.0 - 0.5 % Final     Neutrophils, Absolute   Date Value Ref Range Status   05/02/2025 4.10 1.70 - 7.00 10*3/mm3 Final     Lymphocytes, Absolute   Date Value Ref Range Status   05/02/2025 2.42 0.70 - 3.10 10*3/mm3 Final     Monocytes, Absolute   Date Value Ref Range Status   05/02/2025 0.85 0.10 - 0.90 10*3/mm3 Final     Eosinophils, Absolute   Date Value Ref Range Status   05/02/2025 0.23 0.00 - 0.40 10*3/mm3 Final     Basophils, Absolute   Date Value Ref Range Status   05/02/2025 0.03 0.00 - 0.20 10*3/mm3 Final     Immature Grans, Absolute   Date Value Ref Range Status   05/02/2025 0.02 0.00 - 0.05 10*3/mm3 Final     nRBC   Date Value Ref Range Status   05/02/2025 0.0 0.0 - 0.2 /100 WBC Final       Lab Results   Component Value Date    GLUCOSE 112 (H) 05/02/2025    BUN 11 05/02/2025    CREATININE 0.33 (L) 05/02/2025     (L) 05/02/2025    K 4.4 05/02/2025    CL 97 (L) 05/02/2025    CALCIUM 9.5 05/02/2025    PROTEINTOT 6.6 05/01/2025    ALBUMIN 3.7 05/02/2025     ALT 14 05/01/2025    AST 22 05/01/2025    ALKPHOS 160 (H) 05/01/2025    BILITOT 0.5 05/01/2025    GLOB 2.6 05/01/2025    AGRATIO 1.5 05/01/2025    BCR 33.3 (H) 05/02/2025    ANIONGAP 12.0 05/02/2025    EGFR 113.1 05/02/2025     CT Head Without Contrast  Result Date: 5/2/2025  Impression: 1.No acute intracranial process identified. 2.Mild paranasal sinus mucosal disease. Electronically Signed: Cristi Be MD  5/2/2025 12:16 PM EDT  Workstation ID: [id number]    CT Angiogram Head w AI Analysis of LVO  Result Date: 4/30/2025  Impression: Mild cervical and intracranial atherosclerotic changes are present as above, without evidence of associated high-grade stenosis or large vessel occlusion. 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 Electronically Signed: Barney Valle MD  4/30/2025 4:51 PM EDT  Workstation ID: [id number]    CT Angiogram Neck  Result Date: 4/30/2025  Impression: Mild cervical and intracranial atherosclerotic changes are present as above, without evidence of associated high-grade stenosis or large vessel occlusion. 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 Electronically Signed: Barney Valle MD  4/30/2025 4:51 PM EDT  Workstation ID: [id number]    CT Head Without Contrast Stroke Protocol  Result Date: 4/30/2025  Impression: CT scan of the head without IV contrast demonstrating mild prominence of the ventricles which may be due to atrophy or mild ventricular hypertension. Mild white matter changes consistent with gliosis and/or scattered old lacunar infarcts. No acute intracranial abnormality is seen. Electronically Signed: Bj Champagne MD  4/30/2025 4:48 PM EDT  Workstation ID: [id number]     -A1c from 5/1/2025 was 7.2%  -LDL from 5/1/2025 was  46    Assessment/Plan     This is a 70-year-old female with known medical diagnoses of essential hypertension, diabetes mellitus type 2, rheumatoid arthritis, history of pancreatic cancer (2011), COPD, chronic dysphagia, remote stroke (1990s, residual left-sided weakness), and chronic pain 2/2 injury with broken back s/p reconstruction and chronic kyphosis with insertion of pain pump presents to the emergency department via EMS for further evaluation of right-sided weakness, numbness, and dysarthria.  Patient's LKW was 0300 this morning therefore she is not a candidate for IV thrombolytic therapy.  There was a delay in obtaining CT imaging secondary to patient's pain and chronic kyphosis she required sedation to assist with lying flat on the scanner.  CTA head/neck is negative for flow-limiting stenosis or LVO; incidental LMCA aneurysm noted.  Patient will require admission to the hospitalist service for further evaluation.     Antiplatelet PTA: None  Anticoagulant PTA: None    #Right sided weakness/numbness and dysarthria  #History of right hemispheric stroke with residual left sided weakness   #Left MCA aneurysm 3mm x 5mm x 7mm; incidental  -Etiology of patient's symptoms likely TIA    Recommendations:  - MRI brain cannot be obtained 2/2 kyposis and pain pump  - Repeat CTH (5/2/2025) to assess for infarct is negative, however patient still has notable right upper extremity weakness  - TTE: pending  - Meds: Continue Plavix 75 mg daily for secondary stroke prevention.  - Recommend Discontinuing outpatient omeprazole and replace with Protonix to decrease interaction with Plavix.   - Activity as tolerated, fall risk precautions  - PT/OT/SLP recommend IPR, however patient plan is home   - Will need to follow-up with neurosurgery outpatient for incidental aneurysm  -Stroke clinic follow up 4-6 weeks     #Essential hypertension  - Blood pressure goals of normotension  - Overall management per Hospital Medicine Team       #Hyperlipidemia  - Atorvastatin 40 mg nightly  - Goal LDL <70 for secondary stroke prevention      #Diabetes Mellitus type 2  - Maintain euglycemia  - Management per primary team  - Goal A1c <7.0 for secondary stroke prevention     Discussed the importance of medication compliance Plavix 75mg daily and lifestyle modifications adequate control of blood pressure, adequate control of cholesterol (goal LDL <70), adequate control of glucose (<140, A1c goal <7), increased physical activity, and implementation of healthy diet to help reduce the risk of future cerebrovascular events.  Also discussed the signs symptoms that would warrant the patient return back to the emergency department including unilateral weakness, unilateral numbness, visual disturbances, loss of balance, speech difficulties, and/or a sudden severe headache.  Patient verbalized understanding.     Plan of care discussed with Dr. Angel, patient and primary RN. Stroke Neurology will sign off. Please call with any questions or concerns.    SANDIP Haq  05/03/25  [id number]

## 2025-05-04 LAB
GLUCOSE BLDC GLUCOMTR-MCNC: 147 MG/DL (ref 70–130)
GLUCOSE BLDC GLUCOMTR-MCNC: 181 MG/DL (ref 70–130)
GLUCOSE BLDC GLUCOMTR-MCNC: 203 MG/DL (ref 70–130)
GLUCOSE BLDC GLUCOMTR-MCNC: 232 MG/DL (ref 70–130)
QT INTERVAL: 338 MS
QTC INTERVAL: 448 MS

## 2025-05-04 PROCEDURE — 63710000001 INSULIN LISPRO (HUMAN) PER 5 UNITS: Performed by: STUDENT IN AN ORGANIZED HEALTH CARE EDUCATION/TRAINING PROGRAM

## 2025-05-04 PROCEDURE — 82948 REAGENT STRIP/BLOOD GLUCOSE: CPT

## 2025-05-04 PROCEDURE — 99232 SBSQ HOSP IP/OBS MODERATE 35: CPT | Performed by: INTERNAL MEDICINE

## 2025-05-04 RX ORDER — LIDOCAINE 4 G/G
2 PATCH TOPICAL
Status: DISCONTINUED | OUTPATIENT
Start: 2025-05-05 | End: 2025-05-04

## 2025-05-04 RX ORDER — LIDOCAINE 4 G/G
2 PATCH TOPICAL
Status: DISCONTINUED | OUTPATIENT
Start: 2025-05-04 | End: 2025-05-09 | Stop reason: HOSPADM

## 2025-05-04 RX ORDER — ATENOLOL 25 MG/1
25 TABLET ORAL DAILY
Status: DISCONTINUED | OUTPATIENT
Start: 2025-05-04 | End: 2025-05-09 | Stop reason: HOSPADM

## 2025-05-04 RX ORDER — AMLODIPINE BESYLATE 5 MG/1
5 TABLET ORAL DAILY
Status: DISCONTINUED | OUTPATIENT
Start: 2025-05-04 | End: 2025-05-09 | Stop reason: HOSPADM

## 2025-05-04 RX ADMIN — MICONAZOLE NITRATE 1 APPLICATION: 20 POWDER TOPICAL at 11:58

## 2025-05-04 RX ADMIN — BACLOFEN 10 MG: 10 TABLET ORAL at 15:24

## 2025-05-04 RX ADMIN — ROPINIROLE 1 MG: 1 TABLET, FILM COATED ORAL at 15:24

## 2025-05-04 RX ADMIN — ROPINIROLE 1 MG: 1 TABLET, FILM COATED ORAL at 08:00

## 2025-05-04 RX ADMIN — AMLODIPINE BESYLATE 5 MG: 5 TABLET ORAL at 08:00

## 2025-05-04 RX ADMIN — CLOTRIMAZOLE AND BETAMETHASONE DIPROPIONATE: 10; .5 CREAM TOPICAL at 08:00

## 2025-05-04 RX ADMIN — ATENOLOL 25 MG: 25 TABLET ORAL at 08:00

## 2025-05-04 RX ADMIN — ALLOPURINOL 100 MG: 100 TABLET ORAL at 21:37

## 2025-05-04 RX ADMIN — INSULIN LISPRO 4 UNITS: 100 INJECTION, SOLUTION INTRAVENOUS; SUBCUTANEOUS at 21:37

## 2025-05-04 RX ADMIN — Medication 10 ML: at 08:00

## 2025-05-04 RX ADMIN — BUSPIRONE HYDROCHLORIDE 30 MG: 10 TABLET ORAL at 08:00

## 2025-05-04 RX ADMIN — MICONAZOLE NITRATE 1 APPLICATION: 20 POWDER TOPICAL at 21:38

## 2025-05-04 RX ADMIN — LIDOCAINE 2 PATCH: 4 PATCH TOPICAL at 22:58

## 2025-05-04 RX ADMIN — GABAPENTIN 600 MG: 300 CAPSULE ORAL at 05:28

## 2025-05-04 RX ADMIN — BACLOFEN 10 MG: 10 TABLET ORAL at 08:00

## 2025-05-04 RX ADMIN — ALLOPURINOL 100 MG: 100 TABLET ORAL at 08:00

## 2025-05-04 RX ADMIN — INSULIN LISPRO 4 UNITS: 100 INJECTION, SOLUTION INTRAVENOUS; SUBCUTANEOUS at 12:27

## 2025-05-04 RX ADMIN — ATORVASTATIN CALCIUM 40 MG: 40 TABLET, FILM COATED ORAL at 21:37

## 2025-05-04 RX ADMIN — CLOPIDOGREL BISULFATE 75 MG: 75 TABLET, FILM COATED ORAL at 08:00

## 2025-05-04 RX ADMIN — GABAPENTIN 600 MG: 300 CAPSULE ORAL at 21:37

## 2025-05-04 RX ADMIN — INSULIN LISPRO 2 UNITS: 100 INJECTION, SOLUTION INTRAVENOUS; SUBCUTANEOUS at 09:03

## 2025-05-04 RX ADMIN — GABAPENTIN 600 MG: 300 CAPSULE ORAL at 13:48

## 2025-05-04 RX ADMIN — BACLOFEN 10 MG: 10 TABLET ORAL at 21:37

## 2025-05-04 RX ADMIN — BUSPIRONE HYDROCHLORIDE 30 MG: 10 TABLET ORAL at 21:37

## 2025-05-04 RX ADMIN — CLOTRIMAZOLE AND BETAMETHASONE DIPROPIONATE: 10; .5 CREAM TOPICAL at 21:38

## 2025-05-04 RX ADMIN — ROPINIROLE 1 MG: 1 TABLET, FILM COATED ORAL at 21:37

## 2025-05-04 NOTE — PROGRESS NOTES
[facility] Medicine Services  PROGRESS NOTE    Patient Name: Phoebe Call  : 1955  MRN: [id number]    Date of Admission: 2025  Primary Care Physician: Provider, No Known    Subjective   Subjective     CC:  New weakness    HPI:  Was planning on sending her home today but now she states she wants rehab if she can go to [facility]      Objective   Objective     Vital Signs:   Temp:  [97.8 °F (36.6 °C)-98.3 °F (36.8 °C)] 98.3 °F (36.8 °C)  Heart Rate:  [59-80] 65  Resp:  [16-18] 18  BP: (148-191)/(68-91) 163/82  Flow (L/min) (Oxygen Therapy):  [3] 3     Physical Exam:  Constitutional: No acute distress, chronically ill-appearing  HENT: NCAT, mucous membranes moist  Respiratory: Respiratory effort normal   Musculoskeletal: No bilateral ankle edema  Psychiatric: Appropriate affect, cooperative  Neurologic: Oriented x 3, speech slow but clear  Skin: No rashes      Results Reviewed:  LAB RESULTS:      Lab 25  0648 25  0735 25  1558 25  1555   WBC 7.65 7.49 7.95  --    HEMOGLOBIN 12.2 11.6* 12.3  --    HEMOGLOBIN, POC  --   --   --  12.9   HEMATOCRIT 39.6 38.3 37.7  --    HEMATOCRIT POC  --   --   --  38   PLATELETS 176 222 257  --    NEUTROS ABS 4.10 3.76 4.40  --    IMMATURE GRANS (ABS) 0.02 0.02 0.03  --    LYMPHS ABS 2.42 2.67 2.61  --    MONOS ABS 0.85 0.69 0.58  --    EOS ABS 0.23 0.31 0.30  --    MCV 88.8 89.7 84.2  --    PROTIME  --   --  14.3  --    APTT  --   --  37.9  --          Lab 25  0648 25  0932 25  0735 25  1653 25  1555   SODIUM 135* 138  --  138  --    POTASSIUM 4.4 4.5  --  4.0  --    CHLORIDE 97* 100  --  99  --    CO2 26.0 27.0  --  28.0  --    ANION GAP 12.0 11.0  --  11.0  --    BUN 11 11  --  9  --    CREATININE 0.33* 0.37*  --  0.35* 0.40*   EGFR 113.1 110.0  --  111.5 108.0   GLUCOSE 112* 113*  --  127*  --    CALCIUM 9.5 9.7  --  9.9  --    MAGNESIUM  --  1.8  --   --   --    PHOSPHORUS 2.8 3.6  --    --   --    HEMOGLOBIN A1C  --   --  7.20*  --   --          Lab 05/02/25  0648 05/01/25  0932 04/30/25  1653   TOTAL PROTEIN  --  6.6 7.2   ALBUMIN 3.7 4.0 4.1   GLOBULIN  --  2.6 3.1   ALT (SGPT)  --  14 8   AST (SGOT)  --  22 28   BILIRUBIN  --  0.5 0.4   ALK PHOS  --  160* 167*         Lab 04/30/25  1558   PROTIME 14.3   INR 1.05         Lab 05/01/25  0932   CHOLESTEROL 96   LDL CHOL 46   HDL CHOL 32*   TRIGLYCERIDES 89             Brief Urine Lab Results  (Last result in the past 365 days)        Color   Clarity   Blood   Leuk Est   Nitrite   Protein   CREAT   Urine HCG        05/01/25 2114 Yellow   Clear   Negative   Moderate (2+)   Negative   Trace                   Microbiology Results Abnormal       None            No radiology results from the last 24 hrs    Results for orders placed during the hospital encounter of 04/30/25    Adult Transthoracic Echo Complete W/ Cont if Necessary Per Protocol (With Agitated Saline)    Interpretation Summary    Left ventricular ejection fraction appears to be 66 - 70%.    Left ventricular wall thickness is consistent with borderline basal asymmetric hypertrophy.  Mild gradient in the LV cavity seen which at best is 16 mmHg    Left ventricular diastolic function is consistent with (grade I) impaired relaxation.    The left atrial cavity is mildly dilated.    Left atrial volume is mildly increased.    Saline test results are negative.    Estimated right ventricular systolic pressure from tricuspid regurgitation is normal (<35 mmHg).      Current medications:  Scheduled Meds:allopurinol, 100 mg, Oral, BID  amLODIPine, 5 mg, Oral, Daily  atenolol, 25 mg, Oral, Daily  atorvastatin, 40 mg, Oral, Nightly  baclofen, 10 mg, Oral, TID  busPIRone, 30 mg, Oral, BID  clopidogrel, 75 mg, Oral, Daily  clotrimazole-betamethasone, , Topical, Q12H  gabapentin, 600 mg, Oral, Q8H  insulin lispro, 2-9 Units, Subcutaneous, 4x Daily AC & at Bedtime  miconazole, 1 Application, Topical,  Q12H  rOPINIRole, 1 mg, Oral, TID  sodium chloride, 10 mL, Intravenous, Q12H  sodium chloride, 10 mL, Intravenous, Q12H      Continuous Infusions:   PRN Meds:.  acetaminophen    ALPRAZolam    aluminum-magnesium hydroxide-simethicone    senna-docusate sodium **AND** polyethylene glycol **AND** bisacodyl **AND** bisacodyl    Calcium Replacement - Follow Nurse / BPA Driven Protocol    dextrose    dextrose    diphenhydrAMINE    glucagon (human recombinant)    Magnesium Standard Dose Replacement - Follow Nurse / BPA Driven Protocol    melatonin    nitroglycerin    ondansetron    Phosphorus Replacement - Follow Nurse / BPA Driven Protocol    Potassium Replacement - Follow Nurse / BPA Driven Protocol    sodium chloride    sodium chloride    sodium chloride    sodium chloride    sodium chloride    Assessment & Plan   Assessment & Plan     Active Hospital Problems    Diagnosis  POA    **Stroke-like symptoms [R29.90]  Yes    CVA (cerebral vascular accident) [I63.9]  Yes    Chronic pain disorder [G89.4]  Yes    Diabetic peripheral neuropathy associated with type 2 diabetes mellitus [E11.42]  Yes    Gastroesophageal reflux disease without esophagitis [K21.9]  Yes    Hyperlipidemia [E78.5]  Yes    Hypothyroidism [E03.9]  Yes    Benign essential hypertension [I10]  Yes    Chronic obstructive pulmonary disease [J44.9]  Yes      Resolved Hospital Problems   No resolved problems to display.        Brief Hospital Course to date:  Phoebe Call is a 70 y.o. female with history of hypertension, type 2 diabetes, prior cancer in 2000, remote CVA admitted for right-sided weakness and slurred speech.  She was evaluated by stroke neurology, unable to have MRI due to pain pump.     TIA/CVA  History of right hemispheric stroke with residual left sided weakness   Left MCA aneurysm 3mm x 5mm x 7mm; incidental  - Right sided weakness/numbness and dysarthria, still weaker than left.  Concerning for left hemispheric infarct  - MRI brain cannot be  obtained 2/2 kyposis and pain pump, repeat CT head unchanged  .-DCed Dilaudid  - Continue Plavix 75mg for now, patient cannot have ASA 2/2 hives  - PT/OT recommending rehab but plan is home  - Will need to follow-up with neurosurgery outpatient for incidental aneurysm   - Neurology evaluated     Essential hypertension  - Not on meds     HLD  - Continue statin     Diabetes Mellitus type 2  -SSI     Severe LBP  Hx of surgical reconstruction, pain pump implantation  - Continue pain control     UA concerning for yeast  - Received 2 doses of Diflucan for initial urine showing yeast  - Culture with mixed dhiraj, no antibiotics or antifungals for now      Expected Discharge Location and Transportation: Home versus SNF  Expected Discharge   Expected Discharge Date: 5/5/2025; Expected Discharge Time:      VTE Prophylaxis:  Mechanical VTE prophylaxis orders are present.         AM-PAC 6 Clicks Score (PT): 8 (05/04/25 0800)    CODE STATUS:   Code Status and Medical Interventions: CPR (Attempt to Resuscitate); Full Support   Ordered at: 04/30/25 2036     Code Status (Patient has no pulse and is not breathing):    CPR (Attempt to Resuscitate)     Medical Interventions (Patient has pulse or is breathing):    Full Support     Level Of Support Discussed With:    Patient       Maricel Parker Cuauhtemoc, DO  05/04/25

## 2025-05-04 NOTE — NURSING NOTE
Pt d/o to time, RA all of shift, NSR to sinus christine (mid-50s), NIH=9, c/o pain in lower back with movement. Refused transfer to chair. More alert this shift. BP elevated in the am, home meds restarted, most recent /82. No evidence of bilat LE itching, scratching. Turned [id number], safety precautions in place, care ongoing.

## 2025-05-05 LAB
ALBUMIN SERPL-MCNC: 4.6 G/DL (ref 3.5–5.2)
ANION GAP SERPL CALCULATED.3IONS-SCNC: 11 MMOL/L (ref 5–15)
BASOPHILS # BLD AUTO: 0.03 10*3/MM3 (ref 0–0.2)
BASOPHILS NFR BLD AUTO: 0.4 % (ref 0–1.5)
BUN SERPL-MCNC: 13 MG/DL (ref 8–23)
BUN/CREAT SERPL: 28.3 (ref 7–25)
CALCIUM SPEC-SCNC: 11.1 MG/DL (ref 8.6–10.5)
CHLORIDE SERPL-SCNC: 95 MMOL/L (ref 98–107)
CO2 SERPL-SCNC: 28 MMOL/L (ref 22–29)
CREAT SERPL-MCNC: 0.46 MG/DL (ref 0.57–1)
DEPRECATED RDW RBC AUTO: 48.1 FL (ref 37–54)
EGFRCR SERPLBLD CKD-EPI 2021: 104.4 ML/MIN/1.73
EOSINOPHIL # BLD AUTO: 0.34 10*3/MM3 (ref 0–0.4)
EOSINOPHIL NFR BLD AUTO: 4.6 % (ref 0.3–6.2)
ERYTHROCYTE [DISTWIDTH] IN BLOOD BY AUTOMATED COUNT: 15.8 % (ref 12.3–15.4)
GLUCOSE BLDC GLUCOMTR-MCNC: 139 MG/DL (ref 70–130)
GLUCOSE BLDC GLUCOMTR-MCNC: 177 MG/DL (ref 70–130)
GLUCOSE BLDC GLUCOMTR-MCNC: 181 MG/DL (ref 70–130)
GLUCOSE BLDC GLUCOMTR-MCNC: 292 MG/DL (ref 70–130)
GLUCOSE SERPL-MCNC: 200 MG/DL (ref 65–99)
HCT VFR BLD AUTO: 46.2 % (ref 34–46.6)
HGB BLD-MCNC: 14.9 G/DL (ref 12–15.9)
IMM GRANULOCYTES # BLD AUTO: 0.02 10*3/MM3 (ref 0–0.05)
IMM GRANULOCYTES NFR BLD AUTO: 0.3 % (ref 0–0.5)
LYMPHOCYTES # BLD AUTO: 2.22 10*3/MM3 (ref 0.7–3.1)
LYMPHOCYTES NFR BLD AUTO: 30.2 % (ref 19.6–45.3)
MCH RBC QN AUTO: 27.5 PG (ref 26.6–33)
MCHC RBC AUTO-ENTMCNC: 32.3 G/DL (ref 31.5–35.7)
MCV RBC AUTO: 85.2 FL (ref 79–97)
MONOCYTES # BLD AUTO: 0.51 10*3/MM3 (ref 0.1–0.9)
MONOCYTES NFR BLD AUTO: 6.9 % (ref 5–12)
NEUTROPHILS NFR BLD AUTO: 4.22 10*3/MM3 (ref 1.7–7)
NEUTROPHILS NFR BLD AUTO: 57.6 % (ref 42.7–76)
NRBC BLD AUTO-RTO: 0 /100 WBC (ref 0–0.2)
PHOSPHATE SERPL-MCNC: 3.3 MG/DL (ref 2.5–4.5)
PLATELET # BLD AUTO: 315 10*3/MM3 (ref 140–450)
PMV BLD AUTO: 10.4 FL (ref 6–12)
POTASSIUM SERPL-SCNC: 3.8 MMOL/L (ref 3.5–5.2)
QT INTERVAL: 342 MS
QTC INTERVAL: 441 MS
RBC # BLD AUTO: 5.42 10*6/MM3 (ref 3.77–5.28)
SODIUM SERPL-SCNC: 134 MMOL/L (ref 136–145)
WBC NRBC COR # BLD AUTO: 7.34 10*3/MM3 (ref 3.4–10.8)

## 2025-05-05 PROCEDURE — 63710000001 INSULIN LISPRO (HUMAN) PER 5 UNITS: Performed by: STUDENT IN AN ORGANIZED HEALTH CARE EDUCATION/TRAINING PROGRAM

## 2025-05-05 PROCEDURE — 85025 COMPLETE CBC W/AUTO DIFF WBC: CPT | Performed by: INTERNAL MEDICINE

## 2025-05-05 PROCEDURE — 92507 TX SP LANG VOICE COMM INDIV: CPT

## 2025-05-05 PROCEDURE — 80069 RENAL FUNCTION PANEL: CPT | Performed by: INTERNAL MEDICINE

## 2025-05-05 PROCEDURE — 82948 REAGENT STRIP/BLOOD GLUCOSE: CPT

## 2025-05-05 PROCEDURE — 99232 SBSQ HOSP IP/OBS MODERATE 35: CPT | Performed by: INTERNAL MEDICINE

## 2025-05-05 RX ADMIN — GABAPENTIN 600 MG: 300 CAPSULE ORAL at 06:11

## 2025-05-05 RX ADMIN — BACLOFEN 10 MG: 10 TABLET ORAL at 08:54

## 2025-05-05 RX ADMIN — GABAPENTIN 600 MG: 300 CAPSULE ORAL at 22:04

## 2025-05-05 RX ADMIN — INSULIN LISPRO 6 UNITS: 100 INJECTION, SOLUTION INTRAVENOUS; SUBCUTANEOUS at 12:28

## 2025-05-05 RX ADMIN — INSULIN LISPRO 2 UNITS: 100 INJECTION, SOLUTION INTRAVENOUS; SUBCUTANEOUS at 18:15

## 2025-05-05 RX ADMIN — MICONAZOLE NITRATE 1 APPLICATION: 20 POWDER TOPICAL at 22:06

## 2025-05-05 RX ADMIN — ROPINIROLE 1 MG: 1 TABLET, FILM COATED ORAL at 15:29

## 2025-05-05 RX ADMIN — ALPRAZOLAM 0.5 MG: 0.5 TABLET ORAL at 22:21

## 2025-05-05 RX ADMIN — ROPINIROLE 1 MG: 1 TABLET, FILM COATED ORAL at 08:54

## 2025-05-05 RX ADMIN — INSULIN LISPRO 2 UNITS: 100 INJECTION, SOLUTION INTRAVENOUS; SUBCUTANEOUS at 08:54

## 2025-05-05 RX ADMIN — CLOPIDOGREL BISULFATE 75 MG: 75 TABLET, FILM COATED ORAL at 08:54

## 2025-05-05 RX ADMIN — ATORVASTATIN CALCIUM 40 MG: 40 TABLET, FILM COATED ORAL at 22:05

## 2025-05-05 RX ADMIN — Medication 5 MG: at 22:21

## 2025-05-05 RX ADMIN — ALLOPURINOL 100 MG: 100 TABLET ORAL at 22:05

## 2025-05-05 RX ADMIN — BACLOFEN 10 MG: 10 TABLET ORAL at 22:04

## 2025-05-05 RX ADMIN — AMLODIPINE BESYLATE 5 MG: 5 TABLET ORAL at 08:54

## 2025-05-05 RX ADMIN — GABAPENTIN 600 MG: 300 CAPSULE ORAL at 15:29

## 2025-05-05 RX ADMIN — CLOTRIMAZOLE AND BETAMETHASONE DIPROPIONATE: 10; .5 CREAM TOPICAL at 22:06

## 2025-05-05 RX ADMIN — BUSPIRONE HYDROCHLORIDE 30 MG: 10 TABLET ORAL at 08:54

## 2025-05-05 RX ADMIN — MICONAZOLE NITRATE 1 APPLICATION: 20 POWDER TOPICAL at 12:28

## 2025-05-05 RX ADMIN — ROPINIROLE 1 MG: 1 TABLET, FILM COATED ORAL at 22:04

## 2025-05-05 RX ADMIN — CLOTRIMAZOLE AND BETAMETHASONE DIPROPIONATE: 10; .5 CREAM TOPICAL at 12:29

## 2025-05-05 RX ADMIN — BUSPIRONE HYDROCHLORIDE 30 MG: 10 TABLET ORAL at 22:05

## 2025-05-05 RX ADMIN — ATENOLOL 25 MG: 25 TABLET ORAL at 08:54

## 2025-05-05 RX ADMIN — BACLOFEN 10 MG: 10 TABLET ORAL at 15:30

## 2025-05-05 RX ADMIN — ALLOPURINOL 100 MG: 100 TABLET ORAL at 08:54

## 2025-05-05 NOTE — CASE MANAGEMENT/SOCIAL WORK
"Discharge Planning Assessment  [facility]     Patient Name: Phoebe Call  MRN: [id number]  Today's Date: 5/5/2025    Admit Date: 4/30/2025    Plan: [facility]     Discharge Plan       Row Name 05/05/25 1410       Plan    Plan [facility]    Patient/Family in Agreement with Plan yes    Plan Comments Followed up with Ms. Call, at the bedside, for discharge planning.    Ms. Call has been evaluated by PT and OT and per notes, recommendation is for SNF rehab.  Discussed rehab with the patient and she requested a referral to [facility].  She stated that she \"does not want to go to a nursing home.\"  Referral given to Geri with [facility].  Once accepted by the facility, a prior auth with the patient's [employer] will be required for the rehab admission.    CM will follow up.    Final Discharge Disposition Code 03 - skilled nursing facility (SNF)               Continued Care and Services - Admitted Since 4/30/2025       Destination       Service Provider Request Status Services Address Phone Fax Patient Preferred    [facility] Pending - No Request Sent -- [address] [address] [phone] --                  Expected Discharge Date and Time       Expected Discharge Date Expected Discharge Time    May 7, 2025           Aliya Echeverria RN    "

## 2025-05-05 NOTE — PROGRESS NOTES
[facility] Medicine Services  PROGRESS NOTE    Patient Name: Phoebe Call  : 1955  MRN: [id number]    Date of Admission: 2025  Primary Care Physician: Provider, No Known    Subjective   Subjective     CC:  New weakness    HPI:  No new issues overnight. Wanted to know why she was brought here and what would have caused a stroke.       Objective   Objective     Vital Signs:   Temp:  [98.2 °F (36.8 °C)-98.7 °F (37.1 °C)] 98.5 °F (36.9 °C)  Heart Rate:  [60-88] 60  Resp:  [16-18] 18  BP: (119-150)/(63-94) 119/63     Physical Exam:  Constitutional: No acute distress, chronically ill-appearing  HENT: NCAT, mucous membranes moist  Respiratory: Respiratory effort normal   Musculoskeletal: No bilateral ankle edema  Psychiatric: Appropriate affect, cooperative  Neurologic: Oriented x 3, speech slow but clear  Skin: No rashes      Results Reviewed:  LAB RESULTS:      Lab 25  0747 25  0648 25  0735 25  1558 25  1555   WBC 7.34 7.65 7.49 7.95  --    HEMOGLOBIN 14.9 12.2 11.6* 12.3  --    HEMOGLOBIN, POC  --   --   --   --  12.9   HEMATOCRIT 46.2 39.6 38.3 37.7  --    HEMATOCRIT POC  --   --   --   --  38   PLATELETS 315 176 222 257  --    NEUTROS ABS 4.22 4.10 3.76 4.40  --    IMMATURE GRANS (ABS) 0.02 0.02 0.02 0.03  --    LYMPHS ABS 2.22 2.42 2.67 2.61  --    MONOS ABS 0.51 0.85 0.69 0.58  --    EOS ABS 0.34 0.23 0.31 0.30  --    MCV 85.2 88.8 89.7 84.2  --    PROTIME  --   --   --  14.3  --    APTT  --   --   --  37.9  --          Lab 25  0747 25  0648 25  0932 25  0735 25  1653 25  1555   SODIUM 134* 135* 138  --  138  --    POTASSIUM 3.8 4.4 4.5  --  4.0  --    CHLORIDE 95* 97* 100  --  99  --    CO2 28.0 26.0 27.0  --  28.0  --    ANION GAP 11.0 12.0 11.0  --  11.0  --    BUN 13 11 11  --  9  --    CREATININE 0.46* 0.33* 0.37*  --  0.35* 0.40*   EGFR 104.4 113.1 110.0  --  111.5 108.0   GLUCOSE 200* 112* 113*  --  127*   --    CALCIUM 11.1* 9.5 9.7  --  9.9  --    MAGNESIUM  --   --  1.8  --   --   --    PHOSPHORUS 3.3 2.8 3.6  --   --   --    HEMOGLOBIN A1C  --   --   --  7.20*  --   --          Lab 05/05/25  0747 05/02/25  0648 05/01/25  0932 04/30/25  1653   TOTAL PROTEIN  --   --  6.6 7.2   ALBUMIN 4.6 3.7 4.0 4.1   GLOBULIN  --   --  2.6 3.1   ALT (SGPT)  --   --  14 8   AST (SGOT)  --   --  22 28   BILIRUBIN  --   --  0.5 0.4   ALK PHOS  --   --  160* 167*         Lab 04/30/25  1558   PROTIME 14.3   INR 1.05         Lab 05/01/25  0932   CHOLESTEROL 96   LDL CHOL 46   HDL CHOL 32*   TRIGLYCERIDES 89             Brief Urine Lab Results  (Last result in the past 365 days)        Color   Clarity   Blood   Leuk Est   Nitrite   Protein   CREAT   Urine HCG        05/01/25 2114 Yellow   Clear   Negative   Moderate (2+)   Negative   Trace                   Microbiology Results Abnormal       None            No radiology results from the last 24 hrs    Results for orders placed during the hospital encounter of 04/30/25    Adult Transthoracic Echo Complete W/ Cont if Necessary Per Protocol (With Agitated Saline)    Interpretation Summary    Left ventricular ejection fraction appears to be 66 - 70%.    Left ventricular wall thickness is consistent with borderline basal asymmetric hypertrophy.  Mild gradient in the LV cavity seen which at best is 16 mmHg    Left ventricular diastolic function is consistent with (grade I) impaired relaxation.    The left atrial cavity is mildly dilated.    Left atrial volume is mildly increased.    Saline test results are negative.    Estimated right ventricular systolic pressure from tricuspid regurgitation is normal (<35 mmHg).      Current medications:  Scheduled Meds:allopurinol, 100 mg, Oral, BID  amLODIPine, 5 mg, Oral, Daily  atenolol, 25 mg, Oral, Daily  atorvastatin, 40 mg, Oral, Nightly  baclofen, 10 mg, Oral, TID  busPIRone, 30 mg, Oral, BID  clopidogrel, 75 mg, Oral,  Daily  clotrimazole-betamethasone, , Topical, Q12H  gabapentin, 600 mg, Oral, Q8H  insulin lispro, 2-9 Units, Subcutaneous, 4x Daily AC & at Bedtime  Lidocaine, 2 patch, Transdermal, Q24H  miconazole, 1 Application, Topical, Q12H  rOPINIRole, 1 mg, Oral, TID  sodium chloride, 10 mL, Intravenous, Q12H  sodium chloride, 10 mL, Intravenous, Q12H      Continuous Infusions:   PRN Meds:.  acetaminophen    ALPRAZolam    aluminum-magnesium hydroxide-simethicone    senna-docusate sodium **AND** polyethylene glycol **AND** bisacodyl **AND** bisacodyl    Calcium Replacement - Follow Nurse / BPA Driven Protocol    dextrose    dextrose    diphenhydrAMINE    glucagon (human recombinant)    Magnesium Standard Dose Replacement - Follow Nurse / BPA Driven Protocol    melatonin    nitroglycerin    ondansetron    Phosphorus Replacement - Follow Nurse / BPA Driven Protocol    Potassium Replacement - Follow Nurse / BPA Driven Protocol    sodium chloride    sodium chloride    sodium chloride    sodium chloride    sodium chloride    Assessment & Plan   Assessment & Plan     Active Hospital Problems    Diagnosis  POA    **Stroke-like symptoms [R29.90]  Yes    CVA (cerebral vascular accident) [I63.9]  Yes    Chronic pain disorder [G89.4]  Yes    Diabetic peripheral neuropathy associated with type 2 diabetes mellitus [E11.42]  Yes    Gastroesophageal reflux disease without esophagitis [K21.9]  Yes    Hyperlipidemia [E78.5]  Yes    Hypothyroidism [E03.9]  Yes    Benign essential hypertension [I10]  Yes    Chronic obstructive pulmonary disease [J44.9]  Yes      Resolved Hospital Problems   No resolved problems to display.        Brief Hospital Course to date:  Phoebe Call is a 70 y.o. female with history of hypertension, type 2 diabetes, prior cancer in 2000, remote CVA admitted for right-sided weakness and slurred speech.  She was evaluated by stroke neurology, unable to have MRI due to pain pump.     TIA/CVA  History of right hemispheric  stroke with residual left sided weakness   Left MCA aneurysm 3mm x 5mm x 7mm; incidental  - Right sided weakness/numbness and dysarthria, still weaker than left.  Concerning for left hemispheric infarct  - MRI brain cannot be obtained due to kyphosis and pain pump, repeat CT head unchanged  .-DCed Dilaudid  - Continue Plavix 75mg for now, patient cannot have ASA due to hives  - PT/OT recommending rehab but plan is home  - Will need to follow-up with neurosurgery outpatient for incidental aneurysm   - Neurology evaluated     Essential hypertension  - Not on meds     HLD  - Continue statin     Diabetes Mellitus type 2  -SSI     Severe LBP  Hx of surgical reconstruction, pain pump implantation  - Continue pain control     UA concerning for yeast  - Received 2 doses of Diflucan for initial urine showing yeast  - Culture with mixed dhiraj, no antibiotics or antifungals for now      Expected Discharge Location and Transportation: [facility]  Expected Discharge   Expected Discharge Date: 5/7/2025; Expected Discharge Time:      VTE Prophylaxis:  Mechanical VTE prophylaxis orders are present.         AM-PAC 6 Clicks Score (PT): 8 (05/05/25 0801)    CODE STATUS:   Code Status and Medical Interventions: CPR (Attempt to Resuscitate); Full Support   Ordered at: 04/30/25 2036     Code Status (Patient has no pulse and is not breathing):    CPR (Attempt to Resuscitate)     Medical Interventions (Patient has pulse or is breathing):    Full Support     Level Of Support Discussed With:    Patient       Maribell Posadas MD  05/05/25

## 2025-05-05 NOTE — CASE MANAGEMENT/SOCIAL WORK
Continued Stay Note  [facility]     Patient Name: Phoebe Call  MRN: [id number]  Today's Date: 5/5/2025    Admit Date: 4/30/2025    Plan: [place]   Discharge Plan       Row Name 05/05/25 1536       Plan    Plan Comments MSW received consult about pt's previous home health provider reducing pt's intake. MSW spoke with pt at bedside. Prior to admission, pt has been living alone and had been independent in ADL's and IADL's. Pt reports she has been ash to cook some and has access to food at home. pt reports that her sister in law is a big support and can also help her or get her anything she needs as well. MSW discussed what pt had reported to RN about someone reducing her intake. Pt explained that she was having someone come in her home who was supposed to help her alfonso ebut they did not help much. Pt denies any current unsafe condition or abuse. Pt also reports she was independent prior to recently and had the support and resources to be able to get food as well as prepare it or have other help and support to as well. Pt denies any current safety or any other concerns at this time.                                                     Discharge Codes    No documentation.                 Expected Discharge Date and Time       Expected Discharge Date Expected Discharge Time    May 7, 2025               CELIA Ybarra

## 2025-05-05 NOTE — THERAPY TREATMENT NOTE
Acute Care - Speech Language Pathology Treatment Note  [facility]     Patient Name: Phoebe Call  : 1955  MRN: [id number]  Today's Date: 2025               Admit Date: 2025     Visit Dx:    ICD-10-CM ICD-9-CM   1. Acute right-sided weakness  R53.1 728.87   2. Right sided numbness  R20.0 782.0   3. Dysarthria  R47.1 784.51   4. History of CVA (cerebrovascular accident)  Z86.73 V12.54   5. History of rheumatoid arthritis  Z87.39 V13.4   6. History of diabetes mellitus  Z86.39 V12.29   7. History of hypertension  Z86.79 V12.59   8. Cognitive communication deficit  R41.841 799.52   9. Stroke-like symptoms  R29.90 781.99     Patient Active Problem List   Diagnosis    Stroke-like symptoms    Benign essential hypertension    Chronic obstructive pulmonary disease    Chronic pain disorder    Diabetic peripheral neuropathy associated with type 2 diabetes mellitus    Gastroesophageal reflux disease without esophagitis    Hyperlipidemia    Hypothyroidism    CVA (cerebral vascular accident)     Past Medical History:   Diagnosis Date    Diabetes     History of transfusion     [facility], no reaction    Hypertension     RA (rheumatoid arthritis)      Past Surgical History:   Procedure Laterality Date    BLADDER SUSPENSION      HERNIA REPAIR      HYSTERECTOMY      MASTOID SURGERY      PAIN PUMP INSERTION/REVISION N/A 2024    Procedure: INTRATHECAL CATHETER AND PAIN PUMP IMPLANT;  Surgeon: Cristi Egan MD;  Location: [facility];  Service: Pain Management;  Laterality: N/A;       SLP Recommendation and Plan                    Anticipated Discharge Disposition (SLP): inpatient rehabilitation facility (25 1130)                    Daily Summary of Progress (SLP): progress toward functional goals as expected (25 1130)           Treatment Assessment (SLP): continued, cognitive-linguistic disorder (25 1130)     Plan for Continued Treatment (SLP): continue treatment per plan of care  (05/05/25 1130)         SLP EVALUATION (Last 72 Hours)       SLP SLC Evaluation       Row Name 05/05/25 1130                   Communication Assessment/Intervention    Document Type therapy note (daily note)  -SM        Subjective Information no complaints  -SM        Patient Observations alert;cooperative  -SM        Patient Effort good  -SM           Pain    Pretreatment Pain Rating 8/10  -SM        Posttreatment Pain Rating 8/10  -SM        Pain Location back  -        Pain Side/Orientation lower  -        Pain Management Interventions nursing notified  -           SLP Treatment Clinical Impressions    Treatment Assessment (SLP) continued;cognitive-linguistic disorder  -        Daily Summary of Progress (SLP) progress toward functional goals as expected  -        Plan for Continued Treatment (SLP) continue treatment per plan of care  -        Care Plan Review evaluation/treatment results reviewed;risks/benefits reviewed;patient/other agree to care plan  -           Recommendations    Anticipated Discharge Disposition (SLP) inpatient rehabilitation facility  -                  User Key  (r) = Recorded By, (t) = Taken By, (c) = Cosigned By      Initials Name Effective Dates     Chloe Garcia MS CCC-SLP 01/20/25 -                        EDUCATION  The patient has been educated in the following areas:     Cognitive Impairment Communication Impairment.           SLP GOALS       Row Name 05/05/25 1130             Patient will demonstrate functional cognitive-linguistic skills for return to discharge environment    [place] with minimal cues  -      Time frame 1 week  -      Progress/Outcomes continuing progress toward goal  -         SLP Diagnostic Treatment     Patient will participate in further assessment in the following areas clarification of baseline cognitive communication status;reading comprehension;graphic expression  -      Time Frame (Diagnostic) 1 week  -       "Progress/Assessment (Diagnostic) Cognition impacts reading/writing. Suspect baseline cognitive difficulties. No family present to confirm though pt was living by self PTA, will benefit and agreeable to further tx.  -SM      Progress/Outcomes (Additional Goal 1, SLP) goal met  -SM         Comprehend Questions Goal 1 (SLP)    Improve Ability to Comprehend Questions Goal 1 (SLP) simple wh questions;simple general questions;100%;independently (over 90% accuracy)  -SM      Time Frame (Comprehend Questions Goal 1, SLP) 1 week  -SM      Progress (Ability to Comprehend Questions Goal 1, SLP) 100%;independently (over 90% accuracy)  -SM      Progress/Outcomes (Comprehend Questions Goal 1, SLP) goal met  -SM         Attention Goal 1 (SLP)    Improve Attention by Goal 1 (SLP) complete sustained attention task;100%;independently (over 90% accuracy)  -SM      Time Frame (Attention Goal 1, SLP) 1 week  -SM      Progress (Attention Goal 1, SLP) 60%;with minimal cues (75-90%)  -SM      Progress/Outcomes (Attention Goal 1, SLP) continuing progress toward goal  -SM         Orientation Goal 1 (SLP)    Improve Orientation Through Goal 1 (SLP) demonstrating orientation to month;demonstrating orientation to year;demonstrating orientation to place;demonstrating orientation to disease/impairment;100%;independently (over 90% accuracy)  -SM      Time Frame (Orientation Goal 1, SLP) 1 week  -SM      Progress (Orientation Goal 1, SLP) 30%;independently (over 90% accuracy);other (comment)  -SM      Progress/Outcomes (Orientation Goal 1, SLP) continuing progress toward goal  -SM      Comment (Orientation Goal 1, SLP) Oriented to year. \"I don't know\" for month and situation. \"[facility]\" for place. Once prompted with name of hospital, still with recall of name by end of session.  -SM         Functional Problem Solving Skills Goal 1 (SLP)    Improve Problem Solving Through Goal 1 (SLP) determine solutions to simple ADL/safety problems;answer \"what " "if\" questions;100%;independently (over 90% accuracy)  -SM      Time Frame (Problem Solving Goal 1, SLP) 1 week  -SM      Progress (Problem Solving Goal 1, SLP) 70%;with minimal cues (75-90%)  -SM      Progress/Outcomes (Problem Solving Goal 1, SLP) continuing progress toward goal  -SM         Executive Functional Skills Goal 1 (SLP)    Improve Executive Function Skills Goal 1 (SLP) home management activity;demonstrate awareness of deficit;identify strategies, strengths, limitations;perform self-correction;perform self-evaluation;80%;with minimal cues (75-90%)  -SM      Time Frame (Executive Function Skills Goal 1, SLP) 1 week  -SM      Progress/Outcomes (Executive Function Skills Goal 1, SLP) new goal  -SM                User Key  (r) = Recorded By, (t) = Taken By, (c) = Cosigned By      Initials Name Provider Type    Chloe Gonzales MS CCC-SLP Speech and Language Pathologist                              Time Calculation:      Time Calculation- SLP       Row Name 05/05/25 1206             Time Calculation- SLP    SLP Start Time 1130  -SM      SLP Received On 05/05/25  -SM         Untimed Charges    [id number] Treatment/ST Modification Prosth Aug Alter  38  -SM         Total Minutes    Untimed Charges Total Minutes 38  -SM       Total Minutes 38  -SM                User Key  (r) = Recorded By, (t) = Taken By, (c) = Cosigned By      Initials Name Provider Type    Chloe Gonzales MS CCC-SLP Speech and Language Pathologist                    Therapy Charges for Today       Code Description Service Date Service Provider Modifiers Qty    [id number]  ST TREATMENT SPEECH 3 5/5/2025 Chloe Garcia MS CCC-SLP GN 1                       Chloe Garcia MS CCC-SLP  5/5/2025  "

## 2025-05-06 ENCOUNTER — APPOINTMENT (OUTPATIENT)
Dept: GENERAL RADIOLOGY | Facility: HOSPITAL | Age: 70
DRG: 065 | End: 2025-05-06
Payer: MEDICARE

## 2025-05-06 LAB
ANION GAP SERPL CALCULATED.3IONS-SCNC: 10 MMOL/L (ref 5–15)
BASOPHILS # BLD AUTO: 0.04 10*3/MM3 (ref 0–0.2)
BASOPHILS NFR BLD AUTO: 0.5 % (ref 0–1.5)
BUN SERPL-MCNC: 19 MG/DL (ref 8–23)
BUN/CREAT SERPL: 47.5 (ref 7–25)
CALCIUM SPEC-SCNC: 9.8 MG/DL (ref 8.6–10.5)
CHLORIDE SERPL-SCNC: 97 MMOL/L (ref 98–107)
CO2 SERPL-SCNC: 26 MMOL/L (ref 22–29)
CREAT SERPL-MCNC: 0.4 MG/DL (ref 0.57–1)
DEPRECATED RDW RBC AUTO: 49.1 FL (ref 37–54)
EGFRCR SERPLBLD CKD-EPI 2021: 108 ML/MIN/1.73
EOSINOPHIL # BLD AUTO: 0.22 10*3/MM3 (ref 0–0.4)
EOSINOPHIL NFR BLD AUTO: 2.6 % (ref 0.3–6.2)
ERYTHROCYTE [DISTWIDTH] IN BLOOD BY AUTOMATED COUNT: 15.9 % (ref 12.3–15.4)
GLUCOSE BLDC GLUCOMTR-MCNC: 164 MG/DL (ref 70–130)
GLUCOSE BLDC GLUCOMTR-MCNC: 185 MG/DL (ref 70–130)
GLUCOSE BLDC GLUCOMTR-MCNC: 266 MG/DL (ref 70–130)
GLUCOSE BLDC GLUCOMTR-MCNC: 298 MG/DL (ref 70–130)
GLUCOSE SERPL-MCNC: 120 MG/DL (ref 65–99)
HCT VFR BLD AUTO: 38.9 % (ref 34–46.6)
HGB BLD-MCNC: 12.5 G/DL (ref 12–15.9)
IMM GRANULOCYTES # BLD AUTO: 0.02 10*3/MM3 (ref 0–0.05)
IMM GRANULOCYTES NFR BLD AUTO: 0.2 % (ref 0–0.5)
LYMPHOCYTES # BLD AUTO: 2.56 10*3/MM3 (ref 0.7–3.1)
LYMPHOCYTES NFR BLD AUTO: 30.5 % (ref 19.6–45.3)
MCH RBC QN AUTO: 27.4 PG (ref 26.6–33)
MCHC RBC AUTO-ENTMCNC: 32.1 G/DL (ref 31.5–35.7)
MCV RBC AUTO: 85.1 FL (ref 79–97)
MONOCYTES # BLD AUTO: 0.65 10*3/MM3 (ref 0.1–0.9)
MONOCYTES NFR BLD AUTO: 7.8 % (ref 5–12)
NEUTROPHILS NFR BLD AUTO: 4.89 10*3/MM3 (ref 1.7–7)
NEUTROPHILS NFR BLD AUTO: 58.4 % (ref 42.7–76)
NRBC BLD AUTO-RTO: 0 /100 WBC (ref 0–0.2)
PLATELET # BLD AUTO: 304 10*3/MM3 (ref 140–450)
PMV BLD AUTO: 10.6 FL (ref 6–12)
POTASSIUM SERPL-SCNC: 4.4 MMOL/L (ref 3.5–5.2)
RBC # BLD AUTO: 4.57 10*6/MM3 (ref 3.77–5.28)
SODIUM SERPL-SCNC: 133 MMOL/L (ref 136–145)
WBC NRBC COR # BLD AUTO: 8.38 10*3/MM3 (ref 3.4–10.8)

## 2025-05-06 PROCEDURE — 94640 AIRWAY INHALATION TREATMENT: CPT

## 2025-05-06 PROCEDURE — 71045 X-RAY EXAM CHEST 1 VIEW: CPT

## 2025-05-06 PROCEDURE — 85025 COMPLETE CBC W/AUTO DIFF WBC: CPT | Performed by: INTERNAL MEDICINE

## 2025-05-06 PROCEDURE — 99232 SBSQ HOSP IP/OBS MODERATE 35: CPT

## 2025-05-06 PROCEDURE — 94761 N-INVAS EAR/PLS OXIMETRY MLT: CPT

## 2025-05-06 PROCEDURE — 25810000003 LACTATED RINGERS SOLUTION

## 2025-05-06 PROCEDURE — 82948 REAGENT STRIP/BLOOD GLUCOSE: CPT

## 2025-05-06 PROCEDURE — 97530 THERAPEUTIC ACTIVITIES: CPT

## 2025-05-06 PROCEDURE — 80048 BASIC METABOLIC PNL TOTAL CA: CPT | Performed by: INTERNAL MEDICINE

## 2025-05-06 PROCEDURE — 63710000001 INSULIN LISPRO (HUMAN) PER 5 UNITS: Performed by: STUDENT IN AN ORGANIZED HEALTH CARE EDUCATION/TRAINING PROGRAM

## 2025-05-06 PROCEDURE — 97535 SELF CARE MNGMENT TRAINING: CPT

## 2025-05-06 PROCEDURE — 94664 DEMO&/EVAL PT USE INHALER: CPT

## 2025-05-06 RX ORDER — OXYCODONE AND ACETAMINOPHEN 10; 325 MG/1; MG/1
1 TABLET ORAL DAILY PRN
Refills: 0 | Status: DISCONTINUED | OUTPATIENT
Start: 2025-05-06 | End: 2025-05-06

## 2025-05-06 RX ORDER — OXYCODONE AND ACETAMINOPHEN 10; 325 MG/1; MG/1
1 TABLET ORAL DAILY
Refills: 0 | Status: DISCONTINUED | OUTPATIENT
Start: 2025-05-06 | End: 2025-05-09 | Stop reason: HOSPADM

## 2025-05-06 RX ORDER — IPRATROPIUM BROMIDE AND ALBUTEROL SULFATE 2.5; .5 MG/3ML; MG/3ML
3 SOLUTION RESPIRATORY (INHALATION)
Status: DISCONTINUED | OUTPATIENT
Start: 2025-05-06 | End: 2025-05-09

## 2025-05-06 RX ORDER — FLUTICASONE PROPIONATE 50 MCG
2 SPRAY, SUSPENSION (ML) NASAL DAILY
Status: DISCONTINUED | OUTPATIENT
Start: 2025-05-06 | End: 2025-05-09 | Stop reason: HOSPADM

## 2025-05-06 RX ADMIN — Medication 10 ML: at 05:45

## 2025-05-06 RX ADMIN — ATENOLOL 25 MG: 25 TABLET ORAL at 08:54

## 2025-05-06 RX ADMIN — ALLOPURINOL 100 MG: 100 TABLET ORAL at 08:51

## 2025-05-06 RX ADMIN — Medication 10 ML: at 08:54

## 2025-05-06 RX ADMIN — CLOTRIMAZOLE AND BETAMETHASONE DIPROPIONATE: 10; .5 CREAM TOPICAL at 22:40

## 2025-05-06 RX ADMIN — ROPINIROLE 1 MG: 1 TABLET, FILM COATED ORAL at 18:36

## 2025-05-06 RX ADMIN — INSULIN LISPRO 2 UNITS: 100 INJECTION, SOLUTION INTRAVENOUS; SUBCUTANEOUS at 08:50

## 2025-05-06 RX ADMIN — LIDOCAINE 2 PATCH: 4 PATCH TOPICAL at 22:59

## 2025-05-06 RX ADMIN — ATORVASTATIN CALCIUM 40 MG: 40 TABLET, FILM COATED ORAL at 20:29

## 2025-05-06 RX ADMIN — ALLOPURINOL 100 MG: 100 TABLET ORAL at 20:29

## 2025-05-06 RX ADMIN — OXYCODONE HYDROCHLORIDE AND ACETAMINOPHEN 1 TABLET: 10; 325 TABLET ORAL at 13:16

## 2025-05-06 RX ADMIN — GABAPENTIN 600 MG: 300 CAPSULE ORAL at 13:16

## 2025-05-06 RX ADMIN — FLUTICASONE PROPIONATE 2 SPRAY: 50 SPRAY, METERED NASAL at 09:07

## 2025-05-06 RX ADMIN — BACLOFEN 10 MG: 10 TABLET ORAL at 18:36

## 2025-05-06 RX ADMIN — ALPRAZOLAM 0.5 MG: 0.5 TABLET ORAL at 22:27

## 2025-05-06 RX ADMIN — INSULIN LISPRO 6 UNITS: 100 INJECTION, SOLUTION INTRAVENOUS; SUBCUTANEOUS at 20:38

## 2025-05-06 RX ADMIN — CLOTRIMAZOLE AND BETAMETHASONE DIPROPIONATE: 10; .5 CREAM TOPICAL at 08:51

## 2025-05-06 RX ADMIN — MICONAZOLE NITRATE 1 APPLICATION: 20 POWDER TOPICAL at 08:53

## 2025-05-06 RX ADMIN — GABAPENTIN 600 MG: 300 CAPSULE ORAL at 22:27

## 2025-05-06 RX ADMIN — AMLODIPINE BESYLATE 5 MG: 5 TABLET ORAL at 08:54

## 2025-05-06 RX ADMIN — IPRATROPIUM BROMIDE AND ALBUTEROL SULFATE 3 ML: 2.5; .5 SOLUTION RESPIRATORY (INHALATION) at 17:05

## 2025-05-06 RX ADMIN — SODIUM CHLORIDE, POTASSIUM CHLORIDE, SODIUM LACTATE AND CALCIUM CHLORIDE 1000 ML: 600; 310; 30; 20 INJECTION, SOLUTION INTRAVENOUS at 01:15

## 2025-05-06 RX ADMIN — GABAPENTIN 600 MG: 300 CAPSULE ORAL at 05:44

## 2025-05-06 RX ADMIN — BACLOFEN 10 MG: 10 TABLET ORAL at 20:29

## 2025-05-06 RX ADMIN — INSULIN LISPRO 6 UNITS: 100 INJECTION, SOLUTION INTRAVENOUS; SUBCUTANEOUS at 11:54

## 2025-05-06 RX ADMIN — BUSPIRONE HYDROCHLORIDE 30 MG: 10 TABLET ORAL at 08:50

## 2025-05-06 RX ADMIN — BUSPIRONE HYDROCHLORIDE 30 MG: 10 TABLET ORAL at 20:29

## 2025-05-06 RX ADMIN — MICONAZOLE NITRATE 1 APPLICATION: 20 POWDER TOPICAL at 22:36

## 2025-05-06 RX ADMIN — LIDOCAINE 2 PATCH: 4 PATCH TOPICAL at 00:00

## 2025-05-06 RX ADMIN — ROPINIROLE 1 MG: 1 TABLET, FILM COATED ORAL at 22:27

## 2025-05-06 RX ADMIN — ROPINIROLE 1 MG: 1 TABLET, FILM COATED ORAL at 08:50

## 2025-05-06 RX ADMIN — CLOPIDOGREL BISULFATE 75 MG: 75 TABLET, FILM COATED ORAL at 08:51

## 2025-05-06 RX ADMIN — BACLOFEN 10 MG: 10 TABLET ORAL at 08:51

## 2025-05-06 RX ADMIN — INSULIN LISPRO 2 UNITS: 100 INJECTION, SOLUTION INTRAVENOUS; SUBCUTANEOUS at 18:36

## 2025-05-06 NOTE — THERAPY TREATMENT NOTE
Patient Name: Phoebe Call  : 1955    MRN: [id number]                              Today's Date: 2025       Admit Date: 2025    Visit Dx:     ICD-10-CM ICD-9-CM   1. Acute right-sided weakness  R53.1 728.87   2. Right sided numbness  R20.0 782.0   3. Dysarthria  R47.1 784.51   4. History of CVA (cerebrovascular accident)  Z86.73 V12.54   5. History of rheumatoid arthritis  Z87.39 V13.4   6. History of diabetes mellitus  Z86.39 V12.29   7. History of hypertension  Z86.79 V12.59   8. Cognitive communication deficit  R41.841 799.52   9. Stroke-like symptoms  R29.90 781.99     Patient Active Problem List   Diagnosis    Stroke-like symptoms    Benign essential hypertension    Chronic obstructive pulmonary disease    Chronic pain disorder    Diabetic peripheral neuropathy associated with type 2 diabetes mellitus    Gastroesophageal reflux disease without esophagitis    Hyperlipidemia    Hypothyroidism    CVA (cerebral vascular accident)     Past Medical History:   Diagnosis Date    Diabetes     History of transfusion     [facility], no reaction    Hypertension     RA (rheumatoid arthritis)      Past Surgical History:   Procedure Laterality Date    BLADDER SUSPENSION      HERNIA REPAIR      HYSTERECTOMY      MASTOID SURGERY      PAIN PUMP INSERTION/REVISION N/A 2024    Procedure: INTRATHECAL CATHETER AND PAIN PUMP IMPLANT;  Surgeon: Cristi Egan MD;  Location: [facility];  Service: Pain Management;  Laterality: N/A;      General Information       Row Name 25 1007          OT Time and Intention    Document Type therapy note (daily note)  -CS     Mode of Treatment occupational therapy  -CS     Patient Effort good  -CS       Row Name 25 1007          General Information    Patient Profile Reviewed yes  -CS     Existing Precautions/Restrictions fall;other (see comments)  remote CVA w/ L sided weakness, LLE limited ROM  -CS     Barriers to Rehab medically complex;previous functional  deficit;cognitive status  -       Row Name 05/06/25 1007          Cognition    Orientation Status (Cognition) oriented to;person;place;verbal cues/prompts needed for orientation;time  -       Row Name 05/06/25 1007          Safety Issues/Impairments Affecting Functional Mobility    Safety Issues Affecting Function (Mobility) awareness of need for assistance;insight into deficits/self-awareness;safety precaution awareness;safety precautions follow-through/compliance  -     Impairments Affecting Function (Mobility) balance;cognition;strength;endurance/activity tolerance;postural/trunk control;pain;range of motion (ROM)  -     Cognitive Impairments, Mobility Safety/Performance insight into deficits/self-awareness;safety precaution follow-through;safety precaution awareness;sequencing abilities;judgment;problem-solving/reasoning  -               User Key  (r) = Recorded By, (t) = Taken By, (c) = Cosigned By      Initials Name Provider Type     Ced Guevara OT Occupational Therapist                     Mobility/ADL's       Row Name 05/06/25 1009          Bed Mobility    Supine-Sit [place] (Bed Mobility) 1 person assist;verbal cues;nonverbal cues (demo/gesture);moderate assist (50% patient effort)  -     Bed Mobility, Safety Issues decreased use of legs for bridging/pushing  -     Assistive Device (Bed Mobility) bed rails;head of bed elevated  -     Comment, (Bed Mobility) fair balance upon sitting, no dizziness reported  -       Row Name 05/06/25 1009          Transfers    Comment, (Transfers) SPT, able to sustain supported standing for approx 30secs  -       Row Name 05/06/25 1009          Bed-Chair Transfer    Bed-Chair [place] (Transfers) moderate assist (50% patient effort);1 person assist;verbal cues;nonverbal cues (demo/gesture)  -       Row Name 05/06/25 1009          Sit-Stand Transfer    Sit-Stand [place] (Transfers) moderate assist (50% patient effort);2 person  assist;verbal cues;nonverbal cues (demo/gesture)  -[facility] Name 05/06/25 1009          Functional Mobility    Patient was able to Ambulate yes  -[facility] Name 05/06/25 1009          Activities of Daily Living    BADL Assessment/Intervention lower body dressing;grooming;feeding  -CS       Row Name 05/06/25 1009          Lower Body Dressing Assessment/Training    [place] Level (Lower Body Dressing) don;socks;moderate assist (50% patient effort)  -CS       Row Name 05/06/25 1009          Self-Feeding Assessment/Training    [place] Level (Feeding) feeding skills;prepare tray/open items;scoop food and bring to mouth;liquids to mouth;independent  -     Position (Feeding) supported sitting  -CS       Row Name 05/06/25 1009          Grooming Assessment/Training    [place] Level (Grooming) hair care, combing/brushing;wash face, hands;set up  -               User Key  (r) = Recorded By, (t) = Taken By, (c) = Cosigned By      Initials Name Provider Type     Ced Guevara, OT Occupational Therapist                   Obj/Interventions       Row Name 05/06/25 1016          Sensory Assessment (Somatosensory)    Sensory Assessment (Somatosensory) bilateral UE  -CS       Row Name 05/06/25 1016          Shoulder (Therapeutic Exercise)    Shoulder (Therapeutic Exercise) AROM (active range of motion)  -     Shoulder AROM (Therapeutic Exercise) bilateral;flexion;extension;aBduction;aDduction;2 sets;5 repetitions  -CS       Row Name 05/06/25 1016          Elbow/Forearm (Therapeutic Exercise)    Elbow/Forearm (Therapeutic Exercise) AROM (active range of motion)  -     Elbow/Forearm AROM (Therapeutic Exercise) bilateral;flexion;extension;2 sets;5 repetitions  -CS       Row Name 05/06/25 1016          Motor Skills    Therapeutic Exercise elbow/forearm;shoulder  -CS       Row Name 05/06/25 1016          Balance    Balance Assessment sitting static balance;sitting dynamic balance;standing static  balance;standing dynamic balance  -CS     Static Sitting Balance standby assist  -CS     Dynamic Sitting Balance contact guard  -CS     Position, Sitting Balance unsupported;sitting edge of bed  -CS     Static Standing Balance moderate assist;1-person assist  -CS     Dynamic Standing Balance moderate assist;2-person assist  -CS     Position/Device Used, Standing Balance supported  -CS     Balance Interventions sitting;standing;sit to stand;occupation based/functional task  -CS     Comment, Balance BUE support for transfer  -CS               User Key  (r) = Recorded By, (t) = Taken By, (c) = Cosigned By      Initials Name Provider Type    CS Ced Guevara OT Occupational Therapist                   Goals/Plan    No documentation.                  Clinical Impression       Row Name 05/06/25 1017          Pain Assessment    Additional Documentation Pain Scale: FACES Pre/Post-Treatment (Group)  -       Row Name 05/06/25 1017          Pain Scale: FACES Pre/Post-Treatment    Pain: FACES Scale, Pretreatment 0-->no hurt  -CS     Posttreatment Pain Rating 0-->no hurt  -CS       Row Name 05/06/25 1017          Plan of Care Review    Plan of Care Reviewed With patient  -CS     Progress no change  -CS     Outcome Evaluation Pt agreeable to EOB and OOB activities requiring ModA x1 for transfer and setup for simple seated grooming tasks. Will cont IPOT per POC as tolerated, Rec d/c to SNF.  -       Row Name 05/06/25 1017          Therapy Plan Review/Discharge Plan (OT)    Anticipated Discharge Disposition (OT) skilled nursing facility  -       Row Name 05/06/25 1017          Vital Signs    Pre Systolic BP Rehab --  RN cleared for tx  -CS     O2 Delivery Pre Treatment room air  -CS     O2 Delivery Intra Treatment room air  -CS     O2 Delivery Post Treatment room air  -CS     Pre Patient Position Supine  -CS     Intra Patient Position Standing  -CS     Post Patient Position Sitting  -CS       Row Name 05/06/25 1017           Positioning and Restraints    Pre-Treatment Position in bed  -CS     Post Treatment Position chair  -CS     In Chair notified nsg;reclined;sitting;call light within reach;encouraged to call for assist;exit alarm on;on mechanical lift sling;waffle cushion;legs elevated;heels elevated  -CS               User Key  (r) = Recorded By, (t) = Taken By, (c) = Cosigned By      Initials Name Provider Type    CS Ced Guevara OT Occupational Therapist                   Outcome Measures       Row Name 05/06/25 1023          How much help from another is currently needed...    Putting on and taking off regular lower body clothing? 2  -CS     Bathing (including washing, rinsing, and drying) 2  -CS     Toileting (which includes using toilet bed pan or urinal) 2  -CS     Putting on and taking off regular upper body clothing 3  -CS     Taking care of personal grooming (such as brushing teeth) 3  -CS     Eating meals 4  -CS     AM-PAC 6 Clicks Score (OT) 16  -CS       Row Name 05/06/25 1023          Functional Assessment    Outcome Measure Options AM-PAC 6 Clicks Daily Activity (OT)  -CS               User Key  (r) = Recorded By, (t) = Taken By, (c) = Cosigned By      Initials Name Provider Type    Ced Vieira OT Occupational Therapist                    Occupational Therapy Education       Title: PT OT SLP Therapies (In Progress)       Topic: Occupational Therapy (Done)       Point: ADL training (Done)       Learning Progress Summary            Patient Acceptance, E,D, VU,NR by CS at 5/6/2025 1027                      Point: Home exercise program (Done)       Learning Progress Summary            Patient Acceptance, E,D, VU,NR by  at 5/6/2025 1027                      Point: Precautions (Done)       Learning Progress Summary            Patient Acceptance, E,D, VU,NR by  at 5/6/2025 1027                      Point: Body mechanics (Done)       Learning Progress Summary            Patient Acceptance, E,D, VU,NR by   at 5/6/2025 1027                                      User Key       Initials Effective Dates Name Provider Type Discipline    CS 06/16/21 -  Ced Guevara OT Occupational Therapist OT                  OT Recommendation and Plan     Plan of Care Review  Plan of Care Reviewed With: patient  Progress: no change  Outcome Evaluation: Pt agreeable to EOB and OOB activities requiring ModA x1 for transfer and setup for simple seated grooming tasks. Will cont IPOT per POC as tolerated, Rec d/c to SNF.     Time Calculation:         Time Calculation- OT       Row Name 05/06/25 1027             Time Calculation- OT    OT Start Time 0930  -CS      OT Received On 05/06/25  -CS      OT Goal Re-Cert Due Date 05/12/25  -CS         Timed Charges    99932 - OT Therapeutic Exercise Minutes 6  -CS      07653 - OT Therapeutic Activity Minutes 10  -CS      10576 - OT Self Care/Mgmt Minutes 8  -CS         Total Minutes    Timed Charges Total Minutes 24  -CS       Total Minutes 24  -CS                User Key  (r) = Recorded By, (t) = Taken By, (c) = Cosigned By      Initials Name Provider Type    CS Ced Guevara OT Occupational Therapist                  Therapy Charges for Today       Code Description Service Date Service Provider Modifiers Qty    53974068245 HC OT THERAPEUTIC ACT EA 15 MIN 5/6/2025 Ced Guevara OT GO 1    90722750543 HC OT SELF CARE/MGMT/TRAIN EA 15 MIN 5/6/2025 Ced Guevara OT GO 1                 Ced Guevara OT  5/6/2025

## 2025-05-06 NOTE — PLAN OF CARE
Goal Outcome Evaluation:  Plan of Care Reviewed With: patient        Progress: no change  Outcome Evaluation: Pt agreeable to EOB and OOB activities requiring ModA x1 for transfer and setup for simple seated grooming tasks. Will cont IPOT per POC as tolerated, Rec d/c to SNF.    Anticipated Discharge Disposition (OT): skilled nursing facility

## 2025-05-06 NOTE — THERAPY TREATMENT NOTE
Patient Name: Phoebe Call  : 1955    MRN: [id number]                              Today's Date: 2025       Admit Date: 2025    Visit Dx:     ICD-10-CM ICD-9-CM   1. Acute right-sided weakness  R53.1 728.87   2. Right sided numbness  R20.0 782.0   3. Dysarthria  R47.1 784.51   4. History of CVA (cerebrovascular accident)  Z86.73 V12.54   5. History of rheumatoid arthritis  Z87.39 V13.4   6. History of diabetes mellitus  Z86.39 V12.29   7. History of hypertension  Z86.79 V12.59   8. Cognitive communication deficit  R41.841 799.52   9. Stroke-like symptoms  R29.90 781.99     Patient Active Problem List   Diagnosis    Stroke-like symptoms    Benign essential hypertension    Chronic obstructive pulmonary disease    Chronic pain disorder    Diabetic peripheral neuropathy associated with type 2 diabetes mellitus    Gastroesophageal reflux disease without esophagitis    Hyperlipidemia    Hypothyroidism    CVA (cerebral vascular accident)     Past Medical History:   Diagnosis Date    Diabetes     History of transfusion     [facility], no reaction    Hypertension     RA (rheumatoid arthritis)      Past Surgical History:   Procedure Laterality Date    BLADDER SUSPENSION      HERNIA REPAIR      HYSTERECTOMY      MASTOID SURGERY      PAIN PUMP INSERTION/REVISION N/A 2024    Procedure: INTRATHECAL CATHETER AND PAIN PUMP IMPLANT;  Surgeon: Cristi Egan MD;  Location: [facility];  Service: Pain Management;  Laterality: N/A;      General Information       Row Name 25 1618          Physical Therapy Time and Intention    Document Type therapy note (daily note)  -SC     Mode of Treatment physical therapy  -SC       Row Name 25 1618          General Information    Patient Profile Reviewed yes  -SC     Existing Precautions/Restrictions fall;other (see comments)  L knee with limited flexion, remote cva L weakness  -SC       Row Name 25 1618          Cognition    Orientation Status  (Cognition) oriented x 3  -SC       Row Name 05/06/25 1618          Safety Issues/Impairments Affecting Functional Mobility    Impairments Affecting Function (Mobility) balance;cognition;strength;endurance/activity tolerance;postural/trunk control;pain;range of motion (ROM)  -SC     Comment, Safety Issues/Impairments (Mobility) alert, following commands  -SC               User Key  (r) = Recorded By, (t) = Taken By, (c) = Cosigned By      Initials Name Provider Type    SC Tyler Mayo PT Physical Therapist                   Mobility       Row Name 05/06/25 1619          Bed Mobility    Bed Mobility scooting/bridging;supine-sit;sit-supine  -SC     Scooting/Bridging [place] (Bed Mobility) modified independence  -SC     Supine-Sit [place] (Bed Mobility) 1 person assist;minimum assist (75% patient effort);verbal cues  -SC     Sit-Supine [place] (Bed Mobility) verbal cues;modified independence;standby assist  -SC     Assistive Device (Bed Mobility) bed rails;head of bed elevated  -SC     Comment, (Bed Mobility) worked on getting in/oob. Patient demanding independence and requiring head of bed up and bed rail. Extra time needed  -[facility] Name 05/06/25 1619          Transfers    Comment, (Transfers) STS from EOB x3 with R hand pushing off of bed rail.  Encouraged upright posture. No buckling noted  -[facility] Name 05/06/25 1619          Bed-Chair Transfer    Comment, (Bed-Chair Transfer) patient declilned getting up to chair . Had been up all day  -[facility] Name 05/06/25 1619          Sit-Stand Transfer    Sit-Stand [place] (Transfers) 1 person assist;minimum assist (75% patient effort)  -SC     Assistive Device (Sit-Stand Transfers) walker, front-wheeled  -[facility] Name 05/06/25 1619          Gait/Stairs (Locomotion)    Comment, (Gait/Stairs) deferred  -SC               User Key  (r) = Recorded By, (t) = Taken By, (c) = Cosigned By      Initials Name Provider Type    SC Maria Esther,  Tyler SALCEDO PT Physical Therapist                   Obj/Interventions       [facility] Name 05/06/25 1621          Motor Skills    Therapeutic Exercise hip;knee;ankle  -SC       Row Name 05/06/25 1621          Hip (Therapeutic Exercise)    Hip (Therapeutic Exercise) AAROM (active assistive range of motion)  -SC     Hip AAROM (Therapeutic Exercise) bilateral;flexion;extension;aBduction;aDduction;10 repetitions;supine  -SC       Row Name 05/06/25 1621          Ankle (Therapeutic Exercise)    Ankle (Therapeutic Exercise) AROM (active range of motion)  -SC     Ankle AROM (Therapeutic Exercise) right;bilateral;dorsiflexion;10 repetitions;sitting  -SC       Row Name 05/06/25 1621          Balance    Balance Assessment sitting static balance;sitting dynamic balance;standing static balance  -SC     Static Sitting Balance standby assist  -SC     Dynamic Sitting Balance contact guard  -SC     Static Standing Balance 1-person assist;minimal assist  -SC     Position/Device Used, Standing Balance supported;walker, rolling  -SC     Comment, Balance worked on standing balance with walker. Encouraged upright posture  -SC               User Key  (r) = Recorded By, (t) = Taken By, (c) = Cosigned By      Initials Name Provider Type    SC Tyler Mayo, PT Physical Therapist                   Goals/Plan    No documentation.                  Clinical Impression       Row Name 05/06/25 1623          Pain    Pretreatment Pain Rating 0/10 - no pain  -SC     Posttreatment Pain Rating 0/10 - no pain  -SC       Row Name 05/06/25 1623          Plan of Care Review    Plan of Care Reviewed With patient  -SC     Progress improving  -SC     Outcome Evaluation Patient demonstrated improving bed mobiltiy and standing transfers. I expect she can  progress to ambulation  with assistance  -SC       Row Name 05/06/25 1623          Therapy Assessment/Plan (PT)    Patient/Family Therapy Goals Statement (PT) get up to standing  -SC     Rehab Potential (PT) fair   -SC     Criteria for Skilled Interventions Met (PT) skilled treatment is necessary;meets criteria;yes  -SC     Therapy Frequency (PT) daily  -SC       Row Name 05/06/25 1623          Positioning and Restraints    Pre-Treatment Position in bed  -SC     Post Treatment Position bed  -SC     In Bed supine;call light within reach;with nsg  -SC               User Key  (r) = Recorded By, (t) = Taken By, (c) = Cosigned By      Initials Name Provider Type    SC Tyler Mayo, PT Physical Therapist                   Outcome Measures       Row Name 05/06/25 1624 05/06/25 0800       How much help from another person do you currently need...    Turning from your back to your side while in flat bed without using bedrails? 3  -SC 2  -AP    Moving from lying on back to sitting on the side of a flat bed without bedrails? 3  -SC 2  -AP    Moving to and from a bed to a chair (including a wheelchair)? 2  -SC 1  -AP    Standing up from a chair using your arms (e.g., wheelchair, bedside chair)? 3  -SC 1  -AP    Climbing 3-5 steps with a railing? 1  -SC 1  -AP    To walk in hospital room? 2  -SC 1  -AP    AM-PAC 6 Clicks Score (PT) 14  -SC 8  -AP    Highest Level of Mobility Goal 4 --> Transfer to chair/commode  -SC 3 --> Sit at edge of bed  -AP      Row Name 05/06/25 1624 05/06/25 1023       Functional Assessment    Outcome Measure Options AM-PAC 6 Clicks Basic Mobility (PT)  -SC AM-PAC 6 Clicks Daily Activity (OT)  -              User Key  (r) = Recorded By, (t) = Taken By, (c) = Cosigned By      Initials Name Provider Type    SC Tyler Mayo, PT Physical Therapist    AP Lawrence Rogers, RN Registered Nurse    Ced Vieira OT Occupational Therapist                                 Physical Therapy Education       Title: PT OT SLP Therapies (In Progress)       Topic: Physical Therapy (Done)       Point: Mobility training (Done)       Learning Progress Summary            Patient LIANET Tapia VU by SC at 5/6/2025 7507     Comment: reviewed HEP    Acceptance, E, NR,NL by KR at 5/2/2025 0935                      Point: Home exercise program (Done)       Learning Progress Summary            Patient Eager, E, VU by SC at 5/6/2025 1625    Comment: reviewed HEP                      Point: Body mechanics (Done)       Learning Progress Summary            Patient Eager, E, VU by SC at 5/6/2025 1625    Comment: reviewed HEP    Acceptance, E, NR,NL by KR at 5/2/2025 0935                      Point: Precautions (Done)       Learning Progress Summary            Patient Eager, E, VU by SC at 5/6/2025 1625    Comment: reviewed HEP    Acceptance, E, NR,NL by KR at 5/2/2025 0935                                      User Key       Initials Effective Dates Name Provider Type Discipline    SC 02/03/23 -  Tyler Mayo, PT Physical Therapist PT    KR 12/30/22 -  Evette Goncalves PT Physical Therapist PT                  PT Recommendation and Plan     Progress: improving  Outcome Evaluation: Patient demonstrated improving bed mobiltiy and standing transfers. I expect she can  progress to ambulation  with assistance     Time Calculation:         PT Charges       Row Name 05/06/25 1533             Time Calculation    Start Time 1533  -SC      PT Received On 05/06/25  -SC      PT Goal Re-Cert Due Date 05/12/25  -SC         Timed Charges    87068 - PT Therapeutic Exercise Minutes 4  -SC      21920 - PT Therapeutic Activity Minutes 20  -SC         Total Minutes    Timed Charges Total Minutes 24  -SC       Total Minutes 24  -SC                User Key  (r) = Recorded By, (t) = Taken By, (c) = Cosigned By      Initials Name Provider Type    SC Tyler Mayo, PT Physical Therapist                  Therapy Charges for Today       Code Description Service Date Service Provider Modifiers Qty    61859563572 HC PT THERAPEUTIC ACT EA 15 MIN 5/6/2025 Tyler Mayo, PT GP 2            PT G-Codes  Outcome Measure Options: AM-PAC 6 Clicks Basic Mobility (PT)  AM-PAC  6 Clicks Score (PT): 14  AM-PAC 6 Clicks Score (OT): 16  Modified Turner Scale: 4 - Moderately severe disability.  Unable to walk without assistance, and unable to attend to own bodily needs without assistance.       Tyler Mayo, PT  5/6/2025

## 2025-05-06 NOTE — PLAN OF CARE
Goal Outcome Evaluation:  Plan of Care Reviewed With: patient        Progress: improving  Outcome Evaluation: Patient demonstrated improving bed mobiltiy and standing transfers. I expect she can  progress to ambulation  with assistance

## 2025-05-06 NOTE — PROGRESS NOTES
"    [facility] Medicine Services  PROGRESS NOTE    Patient Name: Phoebe Call  : 1955  MRN: [id number]    Date of Admission: 2025  Primary Care Physician: Provider, No Known    Subjective   Subjective     CC:  Follow up new weakness    HPI:  Patient seen and examined this morning. Phlebotomy at bedside. Noted to be hypotensive last evening, normotensive on exam. Patient reports she takes chronic Percocet at home once daily in addition to her pain pump (verified with Rx fill hx). She states she would also like lidocaine patches to be sent at discharge for her back. She also states she feels short of breath and feels like she is \"breathing through a screen\".     Objective   Objective     Vital Signs:   Temp:  [96.1 °F (35.6 °C)-98.4 °F (36.9 °C)] 97.9 °F (36.6 °C)  Heart Rate:  [53-75] 66  Resp:  [16-18] 16  BP: ()/(50-83) 141/83     Physical Exam:  Constitutional: Chronically ill appearing female sitting up in bedside chair in NAD  HENT: NCAT, mucous membranes moist  Respiratory: Anterior lung fields clear to auscultation bilaterally, nonlabored respirations on room air  Cardiovascular: RRR, no murmurs  Gastrointestinal: Positive bowel sounds, soft, nontender, nondistended  Musculoskeletal: No bilateral ankle edema  Psychiatric: Appropriate affect, cooperative  Neurologic: Alert, oriented, follows commands, speech clear  Skin: No rashes on exposed skin    Results Reviewed:  LAB RESULTS:      Lab 25  0747 25  0648 25  0735 25  1558 25  1555   WBC 7.34 7.65 7.49 7.95  --    HEMOGLOBIN 14.9 12.2 11.6* 12.3  --    HEMOGLOBIN, POC  --   --   --   --  12.9   HEMATOCRIT 46.2 39.6 38.3 37.7  --    HEMATOCRIT POC  --   --   --   --  38   PLATELETS 315 176 222 257  --    NEUTROS ABS 4.22 4.10 3.76 4.40  --    IMMATURE GRANS (ABS) 0.02 0.02 0.02 0.03  --    LYMPHS ABS 2.22 2.42 2.67 2.61  --    MONOS ABS 0.51 0.85 0.69 0.58  --    EOS ABS 0.34 0.23 0.31 0.30 "  --    MCV 85.2 88.8 89.7 84.2  --    PROTIME  --   --   --  14.3  --    APTT  --   --   --  37.9  --          Lab 05/05/25  0747 05/02/25  0648 05/01/25  0932 05/01/25  0735 04/30/25  1653 04/30/25  1555   SODIUM 134* 135* 138  --  138  --    POTASSIUM 3.8 4.4 4.5  --  4.0  --    CHLORIDE 95* 97* 100  --  99  --    CO2 28.0 26.0 27.0  --  28.0  --    ANION GAP 11.0 12.0 11.0  --  11.0  --    BUN 13 11 11  --  9  --    CREATININE 0.46* 0.33* 0.37*  --  0.35* 0.40*   EGFR 104.4 113.1 110.0  --  111.5 108.0   GLUCOSE 200* 112* 113*  --  127*  --    CALCIUM 11.1* 9.5 9.7  --  9.9  --    MAGNESIUM  --   --  1.8  --   --   --    PHOSPHORUS 3.3 2.8 3.6  --   --   --    HEMOGLOBIN A1C  --   --   --  7.20*  --   --          Lab 05/05/25  0747 05/02/25  0648 05/01/25  0932 04/30/25  1653   TOTAL PROTEIN  --   --  6.6 7.2   ALBUMIN 4.6 3.7 4.0 4.1   GLOBULIN  --   --  2.6 3.1   ALT (SGPT)  --   --  14 8   AST (SGOT)  --   --  22 28   BILIRUBIN  --   --  0.5 0.4   ALK PHOS  --   --  160* 167*         Lab 04/30/25  1558   PROTIME 14.3   INR 1.05         Lab 05/01/25  0932   CHOLESTEROL 96   LDL CHOL 46   HDL CHOL 32*   TRIGLYCERIDES 89             Brief Urine Lab Results  (Last result in the past 365 days)        Color   Clarity   Blood   Leuk Est   Nitrite   Protein   CREAT   Urine HCG        05/01/25 2114 Yellow   Clear   Negative   Moderate (2+)   Negative   Trace                   Microbiology Results Abnormal       None            No radiology results from the last 24 hrs    Results for orders placed during the hospital encounter of 04/30/25    Adult Transthoracic Echo Complete W/ Cont if Necessary Per Protocol (With Agitated Saline)    Interpretation Summary    Left ventricular ejection fraction appears to be 66 - 70%.    Left ventricular wall thickness is consistent with borderline basal asymmetric hypertrophy.  Mild gradient in the LV cavity seen which at best is 16 mmHg    Left ventricular diastolic function is  consistent with (grade I) impaired relaxation.    The left atrial cavity is mildly dilated.    Left atrial volume is mildly increased.    Saline test results are negative.    Estimated right ventricular systolic pressure from tricuspid regurgitation is normal (<35 mmHg).      Current medications:  Scheduled Meds:allopurinol, 100 mg, Oral, BID  amLODIPine, 5 mg, Oral, Daily  atenolol, 25 mg, Oral, Daily  atorvastatin, 40 mg, Oral, Nightly  baclofen, 10 mg, Oral, TID  busPIRone, 30 mg, Oral, BID  clopidogrel, 75 mg, Oral, Daily  clotrimazole-betamethasone, , Topical, Q12H  fluticasone, 2 spray, Each Nare, Daily  gabapentin, 600 mg, Oral, Q8H  insulin lispro, 2-9 Units, Subcutaneous, 4x Daily AC & at Bedtime  Lidocaine, 2 patch, Transdermal, Q24H  miconazole, 1 Application, Topical, Q12H  rOPINIRole, 1 mg, Oral, TID  sodium chloride, 10 mL, Intravenous, Q12H  sodium chloride, 10 mL, Intravenous, Q12H      Continuous Infusions:   PRN Meds:.  acetaminophen    ALPRAZolam    aluminum-magnesium hydroxide-simethicone    senna-docusate sodium **AND** polyethylene glycol **AND** bisacodyl **AND** bisacodyl    Calcium Replacement - Follow Nurse / BPA Driven Protocol    dextrose    dextrose    diphenhydrAMINE    glucagon (human recombinant)    Magnesium Standard Dose Replacement - Follow Nurse / BPA Driven Protocol    melatonin    nitroglycerin    ondansetron    Phosphorus Replacement - Follow Nurse / BPA Driven Protocol    Potassium Replacement - Follow Nurse / BPA Driven Protocol    sodium chloride    sodium chloride    sodium chloride    sodium chloride    sodium chloride    Assessment & Plan   Assessment & Plan     Active Hospital Problems    Diagnosis  POA    **Stroke-like symptoms [R29.90]  Yes    CVA (cerebral vascular accident) [I63.9]  Yes    Chronic pain disorder [G89.4]  Yes    Diabetic peripheral neuropathy associated with type 2 diabetes mellitus [E11.42]  Yes    Gastroesophageal reflux disease without esophagitis  "[K21.9]  Yes    Hyperlipidemia [E78.5]  Yes    Hypothyroidism [E03.9]  Yes    Benign essential hypertension [I10]  Yes    Chronic obstructive pulmonary disease [J44.9]  Yes      Resolved Hospital Problems   No resolved problems to display.        Brief Hospital Course to date:  Phoebe Call is a 70 y.o. female with history of hypertension, type 2 diabetes, prior cancer in 2000, remote CVA admitted for right-sided weakness and slurred speech.  She was evaluated by stroke neurology, unable to have MRI due to pain pump.    This patient's problems and plans were partially entered by my partner and updated as appropriate by me 05/06/25.      TIA/CVA  History of right hemispheric stroke with residual left sided weakness   Left MCA aneurysm 3mm x 5mm x 7mm; incidental  - Right sided weakness/numbness and dysarthria, still weaker than left. Concerning for left hemispheric infarct  - MRI brain cannot be obtained due to kyphosis and pain pump, repeat CT head unchanged  - Echo: LVEF 66-70%, negative saline test  - Prior DC'd Dilaudid -- continue home Percocet and gabapentin  - Continue Plavix 75mg for now, patient cannot have ASA due to hives  - PT/OT recommending rehab  - Will need to follow-up with Neurosurgery outpatient for incidental aneurysm   - Neurology evaluated    Dyspnea  - Patient feels like she is \"breathing through a screen\" on 5/6  - CXR pending  - Duo-nebs     Essential hypertension  - Continue amlodipine     HLD  - Continue statin     Diabetes Mellitus type 2  - Continue SSI     Severe LBP  Hx of surgical reconstruction, pain pump implantation  - Patient reports she takes daily Percocet in addition to pain pump (verified in Rx fill hx), follows with Dr. Egan  - Continue home gabapentin  - Patient would like lidocaine patches at DC     UA concerning for yeast  - Received 2 doses of Diflucan for initial urine showing yeast  - Culture with mixed dhiraj, no antibiotics or antifungals for now    Expected " Discharge Location and Transportation: [facility]  Expected Discharge   Expected Discharge Date: 5/7/2025; Expected Discharge Time:      VTE Prophylaxis:  Mechanical VTE prophylaxis orders are present.         AM-PAC 6 Clicks Score (PT): 8 (05/05/25 2000)    CODE STATUS:   Code Status and Medical Interventions: CPR (Attempt to Resuscitate); Full Support   Ordered at: 04/30/25 2036     Code Status (Patient has no pulse and is not breathing):    CPR (Attempt to Resuscitate)     Medical Interventions (Patient has pulse or is breathing):    Full Support     Level Of Support Discussed With:    Patient       Zuleika Juan J Foss PA-C  05/06/25

## 2025-05-07 LAB
GLUCOSE BLDC GLUCOMTR-MCNC: 170 MG/DL (ref 70–130)
GLUCOSE BLDC GLUCOMTR-MCNC: 185 MG/DL (ref 70–130)
GLUCOSE BLDC GLUCOMTR-MCNC: 234 MG/DL (ref 70–130)
GLUCOSE BLDC GLUCOMTR-MCNC: 254 MG/DL (ref 70–130)

## 2025-05-07 PROCEDURE — 94799 UNLISTED PULMONARY SVC/PX: CPT

## 2025-05-07 PROCEDURE — 63710000001 INSULIN LISPRO (HUMAN) PER 5 UNITS: Performed by: STUDENT IN AN ORGANIZED HEALTH CARE EDUCATION/TRAINING PROGRAM

## 2025-05-07 PROCEDURE — 94761 N-INVAS EAR/PLS OXIMETRY MLT: CPT

## 2025-05-07 PROCEDURE — 82948 REAGENT STRIP/BLOOD GLUCOSE: CPT

## 2025-05-07 PROCEDURE — 99232 SBSQ HOSP IP/OBS MODERATE 35: CPT

## 2025-05-07 PROCEDURE — 94664 DEMO&/EVAL PT USE INHALER: CPT

## 2025-05-07 RX ORDER — NICOTINE 21 MG/24HR
1 PATCH, TRANSDERMAL 24 HOURS TRANSDERMAL
Status: DISCONTINUED | OUTPATIENT
Start: 2025-05-07 | End: 2025-05-09 | Stop reason: HOSPADM

## 2025-05-07 RX ADMIN — INSULIN LISPRO 4 UNITS: 100 INJECTION, SOLUTION INTRAVENOUS; SUBCUTANEOUS at 12:12

## 2025-05-07 RX ADMIN — BACLOFEN 10 MG: 10 TABLET ORAL at 16:29

## 2025-05-07 RX ADMIN — CLOTRIMAZOLE AND BETAMETHASONE DIPROPIONATE: 10; .5 CREAM TOPICAL at 21:38

## 2025-05-07 RX ADMIN — GABAPENTIN 600 MG: 300 CAPSULE ORAL at 06:03

## 2025-05-07 RX ADMIN — ALLOPURINOL 100 MG: 100 TABLET ORAL at 21:38

## 2025-05-07 RX ADMIN — ROPINIROLE 1 MG: 1 TABLET, FILM COATED ORAL at 21:38

## 2025-05-07 RX ADMIN — BACLOFEN 10 MG: 10 TABLET ORAL at 09:03

## 2025-05-07 RX ADMIN — INSULIN LISPRO 2 UNITS: 100 INJECTION, SOLUTION INTRAVENOUS; SUBCUTANEOUS at 09:13

## 2025-05-07 RX ADMIN — CLOPIDOGREL BISULFATE 75 MG: 75 TABLET, FILM COATED ORAL at 09:03

## 2025-05-07 RX ADMIN — OXYCODONE HYDROCHLORIDE AND ACETAMINOPHEN 1 TABLET: 10; 325 TABLET ORAL at 09:03

## 2025-05-07 RX ADMIN — BUSPIRONE HYDROCHLORIDE 30 MG: 10 TABLET ORAL at 09:03

## 2025-05-07 RX ADMIN — IPRATROPIUM BROMIDE AND ALBUTEROL SULFATE 3 ML: 2.5; .5 SOLUTION RESPIRATORY (INHALATION) at 08:16

## 2025-05-07 RX ADMIN — GABAPENTIN 600 MG: 300 CAPSULE ORAL at 21:38

## 2025-05-07 RX ADMIN — BUSPIRONE HYDROCHLORIDE 30 MG: 10 TABLET ORAL at 21:38

## 2025-05-07 RX ADMIN — NICOTINE 1 PATCH: 14 PATCH, EXTENDED RELEASE TRANSDERMAL at 09:42

## 2025-05-07 RX ADMIN — BISMUTH SUBSALICYLATE 30 ML: 525 LIQUID ORAL at 10:11

## 2025-05-07 RX ADMIN — ROPINIROLE 1 MG: 1 TABLET, FILM COATED ORAL at 09:02

## 2025-05-07 RX ADMIN — FLUTICASONE PROPIONATE 2 SPRAY: 50 SPRAY, METERED NASAL at 09:05

## 2025-05-07 RX ADMIN — ALPRAZOLAM 0.5 MG: 0.5 TABLET ORAL at 09:14

## 2025-05-07 RX ADMIN — ATORVASTATIN CALCIUM 40 MG: 40 TABLET, FILM COATED ORAL at 21:38

## 2025-05-07 RX ADMIN — ATENOLOL 25 MG: 25 TABLET ORAL at 09:03

## 2025-05-07 RX ADMIN — MICONAZOLE NITRATE 1 APPLICATION: 20 POWDER TOPICAL at 09:04

## 2025-05-07 RX ADMIN — AMLODIPINE BESYLATE 5 MG: 5 TABLET ORAL at 09:02

## 2025-05-07 RX ADMIN — Medication 10 ML: at 21:39

## 2025-05-07 RX ADMIN — CLOTRIMAZOLE AND BETAMETHASONE DIPROPIONATE: 10; .5 CREAM TOPICAL at 09:04

## 2025-05-07 RX ADMIN — INSULIN LISPRO 6 UNITS: 100 INJECTION, SOLUTION INTRAVENOUS; SUBCUTANEOUS at 22:08

## 2025-05-07 RX ADMIN — Medication 10 ML: at 09:03

## 2025-05-07 RX ADMIN — ROPINIROLE 1 MG: 1 TABLET, FILM COATED ORAL at 16:29

## 2025-05-07 RX ADMIN — INSULIN LISPRO 2 UNITS: 100 INJECTION, SOLUTION INTRAVENOUS; SUBCUTANEOUS at 17:07

## 2025-05-07 RX ADMIN — IPRATROPIUM BROMIDE AND ALBUTEROL SULFATE 3 ML: 2.5; .5 SOLUTION RESPIRATORY (INHALATION) at 16:31

## 2025-05-07 RX ADMIN — GABAPENTIN 600 MG: 300 CAPSULE ORAL at 13:49

## 2025-05-07 RX ADMIN — IPRATROPIUM BROMIDE AND ALBUTEROL SULFATE 3 ML: 2.5; .5 SOLUTION RESPIRATORY (INHALATION) at 21:31

## 2025-05-07 RX ADMIN — ALLOPURINOL 100 MG: 100 TABLET ORAL at 09:02

## 2025-05-07 RX ADMIN — BACLOFEN 10 MG: 10 TABLET ORAL at 21:38

## 2025-05-07 RX ADMIN — MICONAZOLE NITRATE 1 APPLICATION: 20 POWDER TOPICAL at 21:39

## 2025-05-07 NOTE — CONSULTS
Diabetes Education    Patient Name:  Phoebe Call  YOB: 1955  MRN: [id number]  Admit Date:  4/30/2025        Reviewed chart for diabetes education consult.  Noted history of diabetes.  Noted A1c of 7.2%.  Noted taking metformin at home.  Spoke to Ms. Call at the bedside this morning.  She stated that she has had diabetes for 4-5 years.  She stated that she uses Ivonne 2 CGM to monitor blood sugar.  She states that she is very careful about what she eats in order to keep her blood sugar in a good range.  She states that her ivonne is charging and asked me to get it for her so she could scan her meter.  She uses a  to scan not her phone.   and her  are in the room today.  I handed her the  and put the  in her bag at her request.  She was very drowsy and would fall asleep mid sentence.  She stated that she wears a pain pump so she can get some relief related to chronic back pain.  She states that she sees provider for diabetes care, but has not been in for a long time, about 8 months.  She asked if I could come back to talk with her tomorrow and maybe she would be more awake.  We will continue to follow to complete diabetes education.  Helped her get her TV going and her call light in reach at her request before I left the room.  Thank you for this referral.       Electronically signed by:  Nannette Newman RN  05/07/25 16:46 EDT

## 2025-05-07 NOTE — PROGRESS NOTES
[facility] Medicine Services  PROGRESS NOTE    Patient Name: Phoebe Call  : 1955  MRN: [id number]    Date of Admission: 2025  Primary Care Physician: Provider, No Known    Subjective   Subjective     CC:  Follow up new weakness    HPI:  Patient seen and examined this morning. Nurse at bedside. Patient tearful about diarrhea overnight. Patient denies abdominal pain, N/V. PRN medication added. She reports pain in her right 2nd toe, and left 5th toe, offered Xray however, patient declines. Patient reports her shortness of breath has improved from yesterday.     Objective   Objective     Vital Signs:   Temp:  [97.7 °F (36.5 °C)-98 °F (36.7 °C)] 97.7 °F (36.5 °C)  Heart Rate:  [53-69] 59  Resp:  [16-18] 16  BP: ()/(56-74) 94/61     Physical Exam:  Constitutional: Chronically ill appearing female sitting up in bed in NAD  HENT: NCAT, mucous membranes moist  Respiratory: Anterior lung fields clear to auscultation bilaterally, nonlabored respirations on room air  Cardiovascular: RRR, no murmurs  Gastrointestinal: Positive bowel sounds, soft, nontender, nondistended  Musculoskeletal: No bilateral ankle edema, redness to right 2nd toe and left 5th toe  Psychiatric: Appropriate affect, cooperative  Neurologic: Alert, oriented, follows commands, speech clear  Skin: No rashes on exposed skin    Results Reviewed:  LAB RESULTS:      Lab 25  1534 25  0747 25  0648 25  0735 25  1558   WBC 8.38 7.34 7.65 7.49 7.95   HEMOGLOBIN 12.5 14.9 12.2 11.6* 12.3   HEMATOCRIT 38.9 46.2 39.6 38.3 37.7   PLATELETS 304 315 176 222 257   NEUTROS ABS 4.89 4.22 4.10 3.76 4.40   IMMATURE GRANS (ABS) 0.02 0.02 0.02 0.02 0.03   LYMPHS ABS 2.56 2.22 2.42 2.67 2.61   MONOS ABS 0.65 0.51 0.85 0.69 0.58   EOS ABS 0.22 0.34 0.23 0.31 0.30   MCV 85.1 85.2 88.8 89.7 84.2   PROTIME  --   --   --   --  14.3   APTT  --   --   --   --  37.9         Lab 25  1534 25  0747  05/02/25  0648 05/01/25  0932 05/01/25  0735 04/30/25  1653   SODIUM 133* 134* 135* 138  --  138   POTASSIUM 4.4 3.8 4.4 4.5  --  4.0   CHLORIDE 97* 95* 97* 100  --  99   CO2 26.0 28.0 26.0 27.0  --  28.0   ANION GAP 10.0 11.0 12.0 11.0  --  11.0   BUN 19 13 11 11  --  9   CREATININE 0.40* 0.46* 0.33* 0.37*  --  0.35*   EGFR 108.0 104.4 113.1 110.0  --  111.5   GLUCOSE 120* 200* 112* 113*  --  127*   CALCIUM 9.8 11.1* 9.5 9.7  --  9.9   MAGNESIUM  --   --   --  1.8  --   --    PHOSPHORUS  --  3.3 2.8 3.6  --   --    HEMOGLOBIN A1C  --   --   --   --  7.20*  --          Lab 05/05/25  0747 05/02/25  0648 05/01/25  0932 04/30/25  1653   TOTAL PROTEIN  --   --  6.6 7.2   ALBUMIN 4.6 3.7 4.0 4.1   GLOBULIN  --   --  2.6 3.1   ALT (SGPT)  --   --  14 8   AST (SGOT)  --   --  22 28   BILIRUBIN  --   --  0.5 0.4   ALK PHOS  --   --  160* 167*         Lab 04/30/25  1558   PROTIME 14.3   INR 1.05         Lab 05/01/25  0932   CHOLESTEROL 96   LDL CHOL 46   HDL CHOL 32*   TRIGLYCERIDES 89             Brief Urine Lab Results  (Last result in the past 365 days)        Color   Clarity   Blood   Leuk Est   Nitrite   Protein   CREAT   Urine HCG        05/01/25 2114 Yellow   Clear   Negative   Moderate (2+)   Negative   Trace                   Microbiology Results Abnormal       None            XR Chest 1 View  Result Date: 5/6/2025  XR CHEST 1 VW Date of Exam: 5/6/2025 12:36 PM EDT Indication: shortness of breath Comparison: April 30, 2025 Findings: The lungs are clear. The heart and mediastinal contours appear normal. There is no pleural effusion. The pulmonary vasculature appears normal. The osseous structures appear intact.     Impression: Impression: No acute cardiopulmonary process. Electronically Signed: Cristi Be MD  5/6/2025 1:23 PM EDT  Workstation ID: [id number]      Results for orders placed during the hospital encounter of 04/30/25    Adult Transthoracic Echo Complete W/ Cont if Necessary Per Protocol (With Agitated  Saline)    Interpretation Summary    Left ventricular ejection fraction appears to be 66 - 70%.    Left ventricular wall thickness is consistent with borderline basal asymmetric hypertrophy.  Mild gradient in the LV cavity seen which at best is 16 mmHg    Left ventricular diastolic function is consistent with (grade I) impaired relaxation.    The left atrial cavity is mildly dilated.    Left atrial volume is mildly increased.    Saline test results are negative.    Estimated right ventricular systolic pressure from tricuspid regurgitation is normal (<35 mmHg).      Current medications:  Scheduled Meds:allopurinol, 100 mg, Oral, BID  amLODIPine, 5 mg, Oral, Daily  atenolol, 25 mg, Oral, Daily  atorvastatin, 40 mg, Oral, Nightly  baclofen, 10 mg, Oral, TID  busPIRone, 30 mg, Oral, BID  clopidogrel, 75 mg, Oral, Daily  clotrimazole-betamethasone, , Topical, Q12H  fluticasone, 2 spray, Each Nare, Daily  gabapentin, 600 mg, Oral, Q8H  insulin lispro, 2-9 Units, Subcutaneous, 4x Daily AC & at Bedtime  ipratropium-albuterol, 3 mL, Nebulization, 4x Daily - RT  Lidocaine, 2 patch, Transdermal, Q24H  miconazole, 1 Application, Topical, Q12H  nicotine, 1 patch, Transdermal, Q24H  oxyCODONE-acetaminophen, 1 tablet, Oral, Daily  rOPINIRole, 1 mg, Oral, TID  sodium chloride, 10 mL, Intravenous, Q12H  sodium chloride, 10 mL, Intravenous, Q12H      Continuous Infusions:   PRN Meds:.  acetaminophen    ALPRAZolam    aluminum-magnesium hydroxide-simethicone    senna-docusate sodium **AND** polyethylene glycol **AND** bisacodyl **AND** bisacodyl    bismuth subsalicylate    Calcium Replacement - Follow Nurse / BPA Driven Protocol    dextrose    dextrose    diphenhydrAMINE    glucagon (human recombinant)    Magnesium Standard Dose Replacement - Follow Nurse / BPA Driven Protocol    melatonin    nitroglycerin    ondansetron    Phosphorus Replacement - Follow Nurse / BPA Driven Protocol    Potassium Replacement - Follow Nurse / BPA Driven  "Protocol    sodium chloride    sodium chloride    sodium chloride    sodium chloride    sodium chloride    Assessment & Plan   Assessment & Plan     Active Hospital Problems    Diagnosis  POA    **Stroke-like symptoms [R29.90]  Yes    CVA (cerebral vascular accident) [I63.9]  Yes    Chronic pain disorder [G89.4]  Yes    Diabetic peripheral neuropathy associated with type 2 diabetes mellitus [E11.42]  Yes    Gastroesophageal reflux disease without esophagitis [K21.9]  Yes    Hyperlipidemia [E78.5]  Yes    Hypothyroidism [E03.9]  Yes    Benign essential hypertension [I10]  Yes    Chronic obstructive pulmonary disease [J44.9]  Yes      Resolved Hospital Problems   No resolved problems to display.        Brief Hospital Course to date:  Phoebe Call is a 70 y.o. female with history of hypertension, type 2 diabetes, prior cancer in 2000, remote CVA admitted for right-sided weakness and slurred speech.  She was evaluated by stroke neurology, unable to have MRI due to pain pump.    This patient's problems and plans were partially entered by my partner and updated as appropriate by me 05/07/25.      TIA/CVA  History of right hemispheric stroke with residual left sided weakness   Left MCA aneurysm 3mm x 5mm x 7mm; incidental  - Right sided weakness/numbness and dysarthria, still weaker than left. Concerning for left hemispheric infarct  - MRI brain cannot be obtained due to kyphosis and pain pump, repeat CT head unchanged  - Echo: LVEF 66-70%, negative saline test  - Prior DC'd Dilaudid -- continue home Percocet and gabapentin  - Continue Plavix 75mg for now, patient cannot have ASA due to hives  - PT/OT recommending rehab  - Will need to follow-up with Neurosurgery outpatient for incidental aneurysm   - Neurology evaluated    Dyspnea - resolved  - Patient feels like she is \"breathing through a screen\" on 5/6  - CXR negative for acute process  - Duo-nebs     Essential hypertension  Hypotension  - Continue amlodipine  - Hold " atenolol for soft/hypotensive BP and bradycardia     HLD  - Continue statin     Diabetes mellitus type 2  - Continue SSI     Severe LBP  Hx of surgical reconstruction, pain pump implantation  - Patient reports she takes daily Percocet in addition to pain pump (verified in Rx fill hx), follows with Dr. Egan  - Continue home gabapentin  - Patient would [facility]oc[facility] at [facility] concerning for yeast  - Received 2 doses of Diflucan for initial urine showing yeast  - Culture with mixed dhiraj, no antibiotics or antifungals for now    Toe pain  Gout  - Pain in right 2nd toe and left 5th toe, patient declines Xray  - Continue allopurinol    Nicotine dependence  - Patient reports vaping prior to admission  - Agreeable to NRT    Attempted to reach patient's sister 5/7/25, sent to voicemail    Expected Discharge Location and Transportation: [facility]  Expected Discharge   Expected Discharge Date: 5/8/2025; Expected Discharge Time:      VTE Prophylaxis:  Mechanical VTE prophylaxis orders are present.         AM-PAC 6 Clicks Score (PT): 14 (05/06/25 1624)    CODE STATUS:   Code Status and Medical Interventions: CPR (Attempt to Resuscitate); Full Support   Ordered at: 04/30/25 2036     Code Status (Patient has no pulse and is not breathing):    CPR (Attempt to Resuscitate)     Medical Interventions (Patient has pulse or is breathing):    Full Support     Level Of Support Discussed With:    Patient       Zuleika Foss PA-C  05/07/25

## 2025-05-07 NOTE — CONSULTS
"          Clinical Nutrition Assessment     Patient Name: Phoebe Call  YOB: 1955  MRN: [id number]  Date of Encounter: 05/07/25 18:41 EDT  Admission date: 4/30/2025  Reason for Visit: Follow-up protocol    Assessment   Nutrition Assessment   Admission Diagnosis:  Stroke [I63.9]  Stroke-like symptoms [R29.90]  CVA (cerebral vascular accident) [I63.9]    Problem List:    Stroke-like symptoms    Benign essential hypertension    Chronic obstructive pulmonary disease    Chronic pain disorder    Diabetic peripheral neuropathy associated with type 2 diabetes mellitus    Gastroesophageal reflux disease without esophagitis    Hyperlipidemia    Hypothyroidism    CVA (cerebral vascular accident)      PMH:   She  has a past medical history of Diabetes, History of transfusion, Hypertension, and RA (rheumatoid arthritis).    PSH:  She  has a past surgical history that includes Hernia repair; Hysterectomy; Bladder suspension; Mastoid surgery; and Pain Pump Insertion/Revision (N/A, 6/21/2024).    Applicable Nutrition History:       Anthropometrics     Height: Height: 154.9 cm (61\")  Last Filed Weight: Weight: 61.7 kg (136 lb) (05/03/25 1119)  Method:    BMI: BMI (Calculated): 25.7    UBW:     Weight      Weight (kg) Weight (lbs) Weight Method   6/14/2024 62.596 kg  138 lb  Stated (S)   6/21/2024 62.596 kg  138 lb  Stated    4/30/2025 58.968 kg  130 lb      61.689 kg  136 lb        Weight change: No significant changes  recently Note wt of 150lbs on 6/18/24 and 133 lbs on 12/6/24 - loss of 17 lbs 11% body wt in a 6 month period = significant. Wt apparently sl increase and stable since that time.     Nutrition Focused Physical Exam    Date: 5/1 - 5/6    Pt does not meet criteria for malnutrition diagnosis, at this time.   5/1  Upon re-examination 5/6 note some facial and thoracic fat wasting detected and some muscle wasting at clavicle acromion process and scapular areas.     Subjective " "  Reported/Observed/Food/Nutrition Related History:     5/5   Pt c/o pain that is \"in her bone\"; will tell her MD about this. Allows was 198 lbs a year ago. Details what helper would feed her, including [employer] yogurt and sandwiches, over time the that she lost wt. Says she now uses an sb flavor suppl at home. Concern expressed r/t inconsistent BS. She is attentive to keeping BS records. Allows she needs chopped meats. Likes oatmeal, scr egg, toast, yogurt, pot roast, broccoli, fresh fruit. Would try vanilla suppl.  Noted sometimes pt taking mostly fruit.    5/1  Patient triggered for nutrition assessment for MST 1 for weight loss along with consult.  Patient laying in bed at time of visit, leaned to her side, reports her shoulder and hip hurt really bad to sit up straight to eat. She hurt herself from a fall prior to admission.  Recent admission to outside hospital late March. Patient reports she went home after that, she feels she was not able to take care of herself as needed. Reports she was set up with a sitter to help her with her meals, however she feels the sitter did not help her that much. She is not sure about weight changes.  Has good appetite if she is provided with food. Eager to eat breakfast meal.      Current Nutrition Prescription   PO: Diet: Cardiac, Diabetic; Healthy Heart (2-3 Na+); Consistent Carbohydrate; Texture: Regular (IDDSI 7); Fluid Consistency: Thin (IDDSI 0)  Oral Nutrition Supplement:   Intake: 3 Days: 55% x 5 meals recorded    Assessment & Plan   Nutrition Diagnosis   Date:  5/6            Updated:    Problem Predicted inadequate nutrient intake    Etiology Limited food pref   Signs/Symptoms 55% x 5 meals recorded    Status: New    Goal / Objectives:   Nutrition to support treatment and Increase intake       Nutrition Intervention      Follow treatment progress, Care plan reviewed, Advise alternate selection, Interview for preferences, Menu provided, Menu adjusted, Encourage intake, " Supplement provided    Note to chop meats added to Food c directions  Yogurt daily and Boost Glucose Control daily added  Follow for intake as able.    Monitoring/Evaluation:   Per protocol, I&O, PO intake, Supplement intake, Pertinent labs, Weight, Symptoms    Sarah Aj RD  Time Spent: 30 min

## 2025-05-07 NOTE — CASE MANAGEMENT/SOCIAL WORK
Continued Stay Note   [place]     Patient Name: Phoebe Call  MRN: [id number]  Today's Date: 5/7/2025    Admit Date: 4/30/2025    Plan: [facility]   Discharge Plan       Row Name 05/07/25 1243       Plan    Plan [facility]    Patient/Family in Agreement with Plan other (see comments)    Plan Comments  spoke with Veronika/[facility]. Veronika states that she is submitting pt's information to their doctor today for Acute rehab. ALL messaged Zaida to update her. Zaida states that pt may be medically ready for discharge tomorrow. Pt will require an insurance precert once a bed is offered.  will continue to follow.    Final Discharge Disposition Code 62 - inpatient rehab facility                   Discharge Codes    No documentation.                 Expected Discharge Date and Time       Expected Discharge Date Expected Discharge Time    May 8, 2025               Jordyn Martins RN

## 2025-05-08 LAB
GLUCOSE BLDC GLUCOMTR-MCNC: 171 MG/DL (ref 70–130)
GLUCOSE BLDC GLUCOMTR-MCNC: 180 MG/DL (ref 70–130)
GLUCOSE BLDC GLUCOMTR-MCNC: 209 MG/DL (ref 70–130)
GLUCOSE BLDC GLUCOMTR-MCNC: 341 MG/DL (ref 70–130)

## 2025-05-08 PROCEDURE — 94799 UNLISTED PULMONARY SVC/PX: CPT

## 2025-05-08 PROCEDURE — 99232 SBSQ HOSP IP/OBS MODERATE 35: CPT

## 2025-05-08 PROCEDURE — 63710000001 DIPHENHYDRAMINE PER 50 MG: Performed by: INTERNAL MEDICINE

## 2025-05-08 PROCEDURE — 82948 REAGENT STRIP/BLOOD GLUCOSE: CPT

## 2025-05-08 PROCEDURE — 97530 THERAPEUTIC ACTIVITIES: CPT

## 2025-05-08 PROCEDURE — 63710000001 INSULIN LISPRO (HUMAN) PER 5 UNITS: Performed by: STUDENT IN AN ORGANIZED HEALTH CARE EDUCATION/TRAINING PROGRAM

## 2025-05-08 PROCEDURE — 97110 THERAPEUTIC EXERCISES: CPT

## 2025-05-08 PROCEDURE — 94664 DEMO&/EVAL PT USE INHALER: CPT

## 2025-05-08 PROCEDURE — 92507 TX SP LANG VOICE COMM INDIV: CPT

## 2025-05-08 RX ADMIN — LIDOCAINE 2 PATCH: 4 PATCH TOPICAL at 00:22

## 2025-05-08 RX ADMIN — GABAPENTIN 600 MG: 300 CAPSULE ORAL at 17:25

## 2025-05-08 RX ADMIN — CLOPIDOGREL BISULFATE 75 MG: 75 TABLET, FILM COATED ORAL at 08:36

## 2025-05-08 RX ADMIN — IPRATROPIUM BROMIDE AND ALBUTEROL SULFATE 3 ML: 2.5; .5 SOLUTION RESPIRATORY (INHALATION) at 21:33

## 2025-05-08 RX ADMIN — BACLOFEN 10 MG: 10 TABLET ORAL at 08:37

## 2025-05-08 RX ADMIN — FLUTICASONE PROPIONATE 2 SPRAY: 50 SPRAY, METERED NASAL at 08:37

## 2025-05-08 RX ADMIN — AMLODIPINE BESYLATE 5 MG: 5 TABLET ORAL at 08:36

## 2025-05-08 RX ADMIN — BUSPIRONE HYDROCHLORIDE 30 MG: 10 TABLET ORAL at 08:36

## 2025-05-08 RX ADMIN — IPRATROPIUM BROMIDE AND ALBUTEROL SULFATE 3 ML: 2.5; .5 SOLUTION RESPIRATORY (INHALATION) at 11:25

## 2025-05-08 RX ADMIN — ALLOPURINOL 100 MG: 100 TABLET ORAL at 20:48

## 2025-05-08 RX ADMIN — ALPRAZOLAM 0.5 MG: 0.5 TABLET ORAL at 17:25

## 2025-05-08 RX ADMIN — DIPHENHYDRAMINE HYDROCHLORIDE 25 MG: 25 CAPSULE ORAL at 08:36

## 2025-05-08 RX ADMIN — ALPRAZOLAM 0.5 MG: 0.5 TABLET ORAL at 00:23

## 2025-05-08 RX ADMIN — DIPHENHYDRAMINE HYDROCHLORIDE 25 MG: 25 CAPSULE ORAL at 20:54

## 2025-05-08 RX ADMIN — CLOTRIMAZOLE AND BETAMETHASONE DIPROPIONATE: 10; .5 CREAM TOPICAL at 08:37

## 2025-05-08 RX ADMIN — Medication 10 ML: at 06:15

## 2025-05-08 RX ADMIN — MICONAZOLE NITRATE 1 APPLICATION: 20 POWDER TOPICAL at 20:50

## 2025-05-08 RX ADMIN — ATORVASTATIN CALCIUM 40 MG: 40 TABLET, FILM COATED ORAL at 20:48

## 2025-05-08 RX ADMIN — GABAPENTIN 600 MG: 300 CAPSULE ORAL at 05:21

## 2025-05-08 RX ADMIN — ALPRAZOLAM 0.5 MG: 0.5 TABLET ORAL at 08:36

## 2025-05-08 RX ADMIN — GABAPENTIN 600 MG: 300 CAPSULE ORAL at 21:00

## 2025-05-08 RX ADMIN — INSULIN LISPRO 2 UNITS: 100 INJECTION, SOLUTION INTRAVENOUS; SUBCUTANEOUS at 17:25

## 2025-05-08 RX ADMIN — ROPINIROLE 1 MG: 1 TABLET, FILM COATED ORAL at 08:37

## 2025-05-08 RX ADMIN — Medication 10 ML: at 20:50

## 2025-05-08 RX ADMIN — BUSPIRONE HYDROCHLORIDE 30 MG: 10 TABLET ORAL at 20:48

## 2025-05-08 RX ADMIN — Medication 10 ML: at 08:39

## 2025-05-08 RX ADMIN — NICOTINE 1 PATCH: 14 PATCH, EXTENDED RELEASE TRANSDERMAL at 08:37

## 2025-05-08 RX ADMIN — OXYCODONE HYDROCHLORIDE AND ACETAMINOPHEN 1 TABLET: 10; 325 TABLET ORAL at 08:36

## 2025-05-08 RX ADMIN — BACLOFEN 10 MG: 10 TABLET ORAL at 20:48

## 2025-05-08 RX ADMIN — MICONAZOLE NITRATE 1 APPLICATION: 20 POWDER TOPICAL at 08:39

## 2025-05-08 RX ADMIN — ROPINIROLE 1 MG: 1 TABLET, FILM COATED ORAL at 17:25

## 2025-05-08 RX ADMIN — ROPINIROLE 1 MG: 1 TABLET, FILM COATED ORAL at 20:48

## 2025-05-08 RX ADMIN — BACLOFEN 10 MG: 10 TABLET ORAL at 17:27

## 2025-05-08 RX ADMIN — INSULIN LISPRO 4 UNITS: 100 INJECTION, SOLUTION INTRAVENOUS; SUBCUTANEOUS at 20:50

## 2025-05-08 RX ADMIN — INSULIN LISPRO 2 UNITS: 100 INJECTION, SOLUTION INTRAVENOUS; SUBCUTANEOUS at 08:37

## 2025-05-08 RX ADMIN — CLOTRIMAZOLE AND BETAMETHASONE DIPROPIONATE: 10; .5 CREAM TOPICAL at 20:49

## 2025-05-08 RX ADMIN — ALLOPURINOL 100 MG: 100 TABLET ORAL at 08:36

## 2025-05-08 RX ADMIN — INSULIN LISPRO 7 UNITS: 100 INJECTION, SOLUTION INTRAVENOUS; SUBCUTANEOUS at 12:53

## 2025-05-08 NOTE — THERAPY TREATMENT NOTE
Patient Name: Phoebe Call  : 1955    MRN: [id number]                              Today's Date: 2025       Admit Date: 2025    Visit Dx:     ICD-10-CM ICD-9-CM   1. Acute right-sided weakness  R53.1 728.87   2. Right sided numbness  R20.0 782.0   3. Dysarthria  R47.1 784.51   4. History of CVA (cerebrovascular accident)  Z86.73 V12.54   5. History of rheumatoid arthritis  Z87.39 V13.4   6. History of diabetes mellitus  Z86.39 V12.29   7. History of hypertension  Z86.79 V12.59   8. Cognitive communication deficit  R41.841 799.52   9. Stroke-like symptoms  R29.90 781.99     Patient Active Problem List   Diagnosis    Stroke-like symptoms    Benign essential hypertension    Chronic obstructive pulmonary disease    Chronic pain disorder    Diabetic peripheral neuropathy associated with type 2 diabetes mellitus    Gastroesophageal reflux disease without esophagitis    Hyperlipidemia    Hypothyroidism    CVA (cerebral vascular accident)     Past Medical History:   Diagnosis Date    Diabetes     History of transfusion     [facility], no reaction    Hypertension     RA (rheumatoid arthritis)      Past Surgical History:   Procedure Laterality Date    BLADDER SUSPENSION      HERNIA REPAIR      HYSTERECTOMY      MASTOID SURGERY      PAIN PUMP INSERTION/REVISION N/A 2024    Procedure: INTRATHECAL CATHETER AND PAIN PUMP IMPLANT;  Surgeon: Cristi Egan MD;  Location: [facility];  Service: Pain Management;  Laterality: N/A;      General Information       Row Name 25 1623          Physical Therapy Time and Intention    Document Type therapy note (daily note)  -SC     Mode of Treatment physical therapy  -SC       Row Name 25 1623          General Information    Patient Profile Reviewed yes  -SC     Existing Precautions/Restrictions fall;other (see comments)  L knee flexion limitations, Hx of remote cva, incontenent  -SC       Row Name 25 1620          Cognition    Orientation Status  (Cognition) oriented x 3  -SC       Row Name 05/08/25 1623          Safety Issues/Impairments Affecting Functional Mobility    Impairments Affecting Function (Mobility) balance;cognition;strength;endurance/activity tolerance;postural/trunk control;pain;range of motion (ROM)  -SC     Comment, Safety Issues/Impairments (Mobility) alert, following commands. needs redirection . talkative  -SC               User Key  (r) = Recorded By, (t) = Taken By, (c) = Cosigned By      Initials Name Provider Type    SC Tyler Mayo PT Physical Therapist                   Mobility       Row Name 05/08/25 1624          Bed Mobility    Bed Mobility rolling right;supine-sit;scooting/bridging;rolling left  -SC     Rolling Left Lohman (Bed Mobility) minimum assist (75% patient effort)  -SC     Rolling Right Lohman (Bed Mobility) 1 person assist  -SC     Scooting/Bridging Lohman (Bed Mobility) modified independence  -SC     Supine-Sit Lohman (Bed Mobility) 1 person assist;minimum assist (75% patient effort);verbal cues  -SC     Assistive Device (Bed Mobility) bed rails;head of bed elevated  -SC     Comment, (Bed Mobility) Rolled to don brief. up to edge of bed with extra time and cues. Able to use bed railing to assist.  -SC       Row Name 05/08/25 1624          Transfers    Comment, (Transfers) Time taken in sitting to work on exercise and sitting balace prior to standing. Worked on STS x3. Required cues for safety and to wait for walker placement. Slow transfers into standing with flexed posture. Encouraged upright posture  -[facility] Name 05/08/25 1624          Sit-Stand Transfer    Sit-Stand Lohman (Transfers) minimum assist (75% patient effort);1 person to manage equipment;1 person assist  -SC     Assistive Device (Sit-Stand Transfers) walker, front-wheeled  -[facility] Name 05/08/25 1625          Gait/Stairs (Locomotion)    Lohman Level (Gait) 1 person to manage equipment;1 person  assist;minimum assist (75% patient effort)  -SC     Assistive Device (Gait) walker, front-wheeled  -SC     Distance in Feet (Gait) 6  -SC     Deviations/Abnormal Patterns (Gait) stride length decreased;weight shifting decreased;gait speed decreased;antalgic;left sided deviations  -SC     Bilateral Gait Deviations forward flexed posture  -SC     Comment, (Gait/Stairs) Demonstrated very flexed posture with ambulation/ C/o L leg pain when wt shifting. Cues to stay closer to walker. No buckling noted. Followed by chair  -SC               User Key  (r) = Recorded By, (t) = Taken By, (c) = Cosigned By      Initials Name Provider Type    SC Tyler Mayo, PT Physical Therapist                   Obj/Interventions       Row Name 05/08/25 1632          Motor Skills    Therapeutic Exercise knee  -SC       Row Name 05/08/25 1632          Knee (Therapeutic Exercise)    Knee (Therapeutic Exercise) strengthening exercise  -SC     Knee Strengthening (Therapeutic Exercise) bilateral;SLR (straight leg raise);10 repetitions;right;LAQ (long arc quad)  -[facility] Name 05/08/25 1632          Balance    Balance Assessment sitting static balance;standing static balance;standing dynamic balance  -SC     Static Sitting Balance modified independence  -SC     Static Standing Balance 1-person assist;1 person to manage equipment;minimal assist  -SC     Dynamic Standing Balance 2-person assist;minimal assist;1 person to manage equipment  -SC     Position/Device Used, Standing Balance supported;walker, rolling  -SC     Comment, Balance slow, unsteady,  -SC               User Key  (r) = Recorded By, (t) = Taken By, (c) = Cosigned By      Initials Name Provider Type    SC Tyler Mayo, PT Physical Therapist                   Goals/Plan    No documentation.                  Clinical Impression       Row Name 05/08/25 1634          Pain    Pain Location extremity  -SC     Pain Side/Orientation lower  -SC     Pain Management Interventions  positioning techniques utilized  -SC     Response to Pain Interventions activity participation with tolerable pain  -SC       Row Name 05/08/25 1634          Pain Scale: FACES Pre/Post-Treatment    Pain: FACES Scale, Pretreatment 2-->hurts little bit  -SC     Posttreatment Pain Rating 4-->hurts little more  -SC       Row Name 05/08/25 1634          Plan of Care Review    Plan of Care Reviewed With patient  -SC     Progress improving  -SC     Outcome Evaluation Patient willing to participate in therapeutic activities. She demonstrated improving mobility and abilty to ambulate. I expect this to improve with continued skilled PT.  Recommend Acute inpatient Rehab at discharge.  -SC       Row Name 05/08/25 1634          Therapy Assessment/Plan (PT)    Patient/Family Therapy Goals Statement (PT) walk  -SC     Rehab Potential (PT) good  -SC     Criteria for Skilled Interventions Met (PT) skilled treatment is necessary;meets criteria;yes  -SC     Therapy Frequency (PT) daily  -[facility] Name 05/08/25 1637          Positioning and Restraints    Pre-Treatment Position in bed  -SC     Post Treatment Position chair  -SC     In Chair notified nsg;reclined;sitting;call light within reach;encouraged to call for assist;exit alarm on  -SC               User Key  (r) = Recorded By, (t) = Taken By, (c) = Cosigned By      Initials Name Provider Type    SC Tyler Mayo, PT Physical Therapist                   Outcome Measures       Row Name 05/08/25 0329          How much help from another person do you currently need...    Turning from your back to your side while in flat bed without using bedrails? 3  -SC     Moving from lying on back to sitting on the side of a flat bed without bedrails? 3  -SC     Moving to and from a bed to a chair (including a wheelchair)? 3  -SC     Standing up from a chair using your arms (e.g., wheelchair, bedside chair)? 3  -SC     Climbing 3-5 steps with a railing? 1  -SC     To walk in hospital room? 2   -SC     AM-PAC 6 Clicks Score (PT) 15  -SC     Highest Level of Mobility Goal 4 --> Transfer to chair/commode  -SC       Row Name 05/08/25 1638          Functional Assessment    Outcome Measure Options AM-PAC 6 Clicks Basic Mobility (PT)  -SC               User Key  (r) = Recorded By, (t) = Taken By, (c) = Cosigned By      Initials Name Provider Type    SC Tyler Mayo, PT Physical Therapist                                 Physical Therapy Education       Title: PT OT SLP Therapies (In Progress)       Topic: Physical Therapy (Done)       Point: Mobility training (Done)       Learning Progress Summary            Patient Eager, E, VU,DU,NR by SC at 5/8/2025 1638    Comment: reviewed HEP    Eager, E, VU by SC at 5/6/2025 1625    Comment: reviewed HEP    Acceptance, E, NR,NL by KR at 5/2/2025 0935                      Point: Home exercise program (Done)       Learning Progress Summary            Patient Eager, E, VU,DU,NR by SC at 5/8/2025 1638    Comment: reviewed HEP    Eager, E, VU by SC at 5/6/2025 1625    Comment: reviewed HEP                      Point: Body mechanics (Done)       Learning Progress Summary            Patient Eager, E, VU,DU,NR by SC at 5/8/2025 1638    Comment: reviewed HEP    Eager, E, VU by SC at 5/6/2025 1625    Comment: reviewed HEP    Acceptance, E, NR,NL by KR at 5/2/2025 0935                      Point: Precautions (Done)       Learning Progress Summary            Patient Eager, E, VU,DU,NR by SC at 5/8/2025 1638    Comment: reviewed HEP    Eager, E, VU by SC at 5/6/2025 1625    Comment: reviewed HEP    Acceptance, E, NR,NL by KR at 5/2/2025 0935                                      User Key       Initials Effective Dates Name Provider Type [facility] 02/03/23 -  Tyler Mayo PT Physical Therapist PT    KR 12/30/22 -  Evette Goncalves PT Physical Therapist PT                  PT Recommendation and Plan     Progress: improving  Outcome Evaluation: Patient willing to participate  in therapeutic activities. She demonstrated improving mobility and abilty to ambulate. I expect this to improve with continued skilled PT.  Recommend Acute inpatient Rehab at discharge.     Time Calculation:         PT Charges       Row Name 05/08/25 1445             Time Calculation    Start Time 1445  -SC      PT Received On 05/08/25  -SC      PT Goal Re-Cert Due Date 05/12/25  -SC         Timed Charges    81498 - PT Therapeutic Exercise Minutes 10  -SC      70971 - Gait Training Minutes  5  -SC      14670 - PT Therapeutic Activity Minutes 30  -SC         Total Minutes    Timed Charges Total Minutes 45  -SC       Total Minutes 45  -SC                User Key  (r) = Recorded By, (t) = Taken By, (c) = Cosigned By      Initials Name Provider Type    SC Tyler Mayo PT Physical Therapist                  Therapy Charges for Today       Code Description Service Date Service Provider Modifiers Qty    31364650017  PT THER PROC EA 15 MIN 5/8/2025 Tyler Mayo, PT GP 1    01509922969 HC PT THERAPEUTIC ACT EA 15 MIN 5/8/2025 Tyler Mayo, PT GP 2    84678059907 HC PT THER SUPP EA 15 MIN 5/8/2025 Tyler Mayo, PT GP 3            PT G-Codes  Outcome Measure Options: AM-PAC 6 Clicks Basic Mobility (PT)  AM-PAC 6 Clicks Score (PT): 15  AM-PAC 6 Clicks Score (OT): 16  Modified Keely Scale: 4 - Moderately severe disability.  Unable to walk without assistance, and unable to attend to own bodily needs without assistance.  PT Discharge Summary  Anticipated Discharge Disposition (PT): inpatient rehabilitation facility    Tyler Mayo PT  5/8/2025

## 2025-05-08 NOTE — PLAN OF CARE
Goal Outcome Evaluation:  Plan of Care Reviewed With: patient           Outcome Evaluation: Improved cognitive communication. Suspect pt is now back to her baseline re: communication- cognition- organization, problem solving, reasoning.  She endorses she feels better now than she has felt in months.  SLP to sign off.    Anticipated Discharge Disposition (SLP): No further SLP services warranted             Treatment Assessment (SLP): improved, cognitive-linguistic disorder, communication disorder (05/08/25 1015)  Treatment Assessment Comments (SLP): Pt now appears to be functioning at baseline.  She is tangential but she can be redirected and stay on task.  Successful with all cognitive communication tasks.  Improved attention, problem solving, and orientation. No further ST services are indicated at this time. (05/08/25 1015)  Plan for Continued Treatment (SLP): treatment no longer indicated as all goals met (05/08/25 1015)

## 2025-05-08 NOTE — CONSULTS
"Diabetes Education    Patient Name:  Phoebe Call  YOB: 1955  MRN: [id number]  Admit Date:  4/30/2025      Total time spent reviewing chart, preparing education/materials, providing education at bedside, and coordinating care approx 40 minutes.    Consult for diabetes education received per stroke protocol. Chart reviewed. Pt was seen at bedside today. Permission given for visit.     Pt seen today as a follow-up from visit with dm educator yesterday. Reviewed pts Ivonne 2 data with her, discussed strategies r/t promotion of stable blood glucose levels, etc. Pt states she takes insulin at home \"as needed\". Follows with endo provider at [facility]. She is able to verbalize appropriate treatment of low blood sugar. Reviewed general sick day rules with her. Discussed importance of ongoing follow-up with providers, and preventive care exams. Discussed resources for support and provided [facility] Diabetes Basics booklet to her. Unable to complete full education verbally due to pt information sharing/story telling. She was encouraged to reach out via provided contact information after discharge, should she seek further support with her diabetes self-mgmt.     Pt is not a candidate for follow-up stroke class based on exclusion criteria of  IPR at discharge.       Electronically signed by:  Lisbeth Watson RN, [facility]  05/08/25 12:42 EDT  "

## 2025-05-08 NOTE — PLAN OF CARE
Goal Outcome Evaluation:  Plan of Care Reviewed With: patient        Progress: improving  Outcome Evaluation: Patient willing to participate in therapeutic activities. She demonstrated improving mobility and abilty to ambulate. I expect this to improve with continued skilled PT.  Recommend Acute inpatient Rehab at discharge.    Anticipated Discharge Disposition (PT): inpatient rehabilitation facility

## 2025-05-08 NOTE — PROGRESS NOTES
[facility] Medicine Services  PROGRESS NOTE    Patient Name: Phoebe Call  : 1955  MRN: [id number]    Date of Admission: 2025  Primary Care Physician: Provider, No Known    Subjective   Subjective     CC:  Follow up new weakness    HPI:  Patient seen and examined this morning. Nurse at bedside. Patient reports her diarrhea has improved after taking Pepto Bismol from home, patient instructed to make nurse aware of any home meds, she expressed understanding. Patient states her feet are itching.     Objective   Objective     Vital Signs:   Temp:  [98 °F (36.7 °C)-98.3 °F (36.8 °C)] 98.3 °F (36.8 °C)  Heart Rate:  [45-67] 60  Resp:  [16-18] 18  BP: ()/(47-73) 115/47     Physical Exam:  Constitutional: Chronically ill appearing female sitting up in bed in NAD  HENT: NCAT, mucous membranes moist  Respiratory: Anterior lung fields clear to auscultation bilaterally, nonlabored respirations on room air  Cardiovascular: RRR, no murmurs  Gastrointestinal: Positive bowel sounds, soft, nontender, nondistended  Musculoskeletal: No bilateral ankle edema, redness to right 2nd toe and left 5th toe (improved)  Psychiatric: Appropriate affect, cooperative  Neurologic: Alert, oriented, follows commands, speech clear  Skin: No rashes on exposed skin    Results Reviewed:  LAB RESULTS:      Lab 25  1534 25  0747 25  0648   WBC 8.38 7.34 7.65   HEMOGLOBIN 12.5 14.9 12.2   HEMATOCRIT 38.9 46.2 39.6   PLATELETS 304 315 176   NEUTROS ABS 4.89 4.22 4.10   IMMATURE GRANS (ABS) 0.02 0.02 0.02   LYMPHS ABS 2.56 2.22 2.42   MONOS ABS 0.65 0.51 0.85   EOS ABS 0.22 0.34 0.23   MCV 85.1 85.2 88.8         Lab 25  1534 25  0747 25  0648   SODIUM 133* 134* 135*   POTASSIUM 4.4 3.8 4.4   CHLORIDE 97* 95* 97*   CO2 26.0 28.0 26.0   ANION GAP 10.0 11.0 12.0   BUN 19 13 11   CREATININE 0.40* 0.46* 0.33*   EGFR 108.0 104.4 113.1   GLUCOSE 120* 200* 112*   CALCIUM 9.8 11.1* 9.5    PHOSPHORUS  --  3.3 2.8         Lab 05/05/25  0747 05/02/25  0648   ALBUMIN 4.6 3.7                         Brief Urine Lab Results  (Last result in the past 365 days)        Color   Clarity   Blood   Leuk Est   Nitrite   Protein   CREAT   Urine HCG        05/01/25 2114 Yellow   Clear   Negative   Moderate (2+)   Negative   Trace                   Microbiology Results Abnormal       None            No radiology results from the last 24 hrs      Results for orders placed during the hospital encounter of 04/30/25    Adult Transthoracic Echo Complete W/ Cont if Necessary Per Protocol (With Agitated Saline)    Interpretation Summary    Left ventricular ejection fraction appears to be 66 - 70%.    Left ventricular wall thickness is consistent with borderline basal asymmetric hypertrophy.  Mild gradient in the LV cavity seen which at best is 16 mmHg    Left ventricular diastolic function is consistent with (grade I) impaired relaxation.    The left atrial cavity is mildly dilated.    Left atrial volume is mildly increased.    Saline test results are negative.    Estimated right ventricular systolic pressure from tricuspid regurgitation is normal (<35 mmHg).      Current medications:  Scheduled Meds:allopurinol, 100 mg, Oral, BID  amLODIPine, 5 mg, Oral, Daily  [Held by provider] atenolol, 25 mg, Oral, Daily  atorvastatin, 40 mg, Oral, Nightly  baclofen, 10 mg, Oral, TID  busPIRone, 30 mg, Oral, BID  clopidogrel, 75 mg, Oral, Daily  clotrimazole-betamethasone, , Topical, Q12H  fluticasone, 2 spray, Each Nare, Daily  gabapentin, 600 mg, Oral, Q8H  insulin lispro, 2-9 Units, Subcutaneous, 4x Daily AC & at Bedtime  ipratropium-albuterol, 3 mL, Nebulization, 4x Daily - RT  Lidocaine, 2 patch, Transdermal, Q24H  miconazole, 1 Application, Topical, Q12H  nicotine, 1 patch, Transdermal, Q24H  oxyCODONE-acetaminophen, 1 tablet, Oral, Daily  rOPINIRole, 1 mg, Oral, TID  sodium chloride, 10 mL, Intravenous, Q12H  sodium chloride,  10 mL, Intravenous, Q12H      Continuous Infusions:   PRN Meds:.  acetaminophen    ALPRAZolam    aluminum-magnesium hydroxide-simethicone    senna-docusate sodium **AND** polyethylene glycol **AND** bisacodyl **AND** bisacodyl    bismuth subsalicylate    Calcium Replacement - Follow Nurse / BPA Driven Protocol    dextrose    dextrose    diphenhydrAMINE    glucagon (human recombinant)    Magnesium Standard Dose Replacement - Follow Nurse / BPA Driven Protocol    melatonin    nitroglycerin    ondansetron    Phosphorus Replacement - Follow Nurse / BPA Driven Protocol    Potassium Replacement - Follow Nurse / BPA Driven Protocol    sodium chloride    sodium chloride    sodium chloride    sodium chloride    sodium chloride    Assessment & Plan   Assessment & Plan     Active Hospital Problems    Diagnosis  POA    **Stroke-like symptoms [R29.90]  Yes    CVA (cerebral vascular accident) [I63.9]  Yes    Chronic pain disorder [G89.4]  Yes    Diabetic peripheral neuropathy associated with type 2 diabetes mellitus [E11.42]  Yes    Gastroesophageal reflux disease without esophagitis [K21.9]  Yes    Hyperlipidemia [E78.5]  Yes    Hypothyroidism [E03.9]  Yes    Benign essential hypertension [I10]  Yes    Chronic obstructive pulmonary disease [J44.9]  Yes      Resolved Hospital Problems   No resolved problems to display.        Brief Hospital Course to date:  Phoebe Call is a 70 y.o. female with history of hypertension, type 2 diabetes, prior cancer in 2000, remote CVA admitted for right-sided weakness and slurred speech.  She was evaluated by stroke neurology, unable to have MRI due to pain pump.    This patient's problems and plans were partially entered by my partner and updated as appropriate by me 05/08/25.      TIA/CVA  History of right hemispheric stroke with residual left sided weakness   Left MCA aneurysm 3mm x 5mm x 7mm; incidental  - Right sided weakness/numbness and dysarthria, still weaker than left. Concerning for  "left hemispheric infarct  - MRI brain cannot be obtained due to kyphosis and pain pump, repeat CT head unchanged  - Echo: LVEF 66-70%, negative saline test  - Prior DC'd Dilaudid -- continue home Percocet and gabapentin  - Continue Plavix 75mg for now, patient cannot have ASA due to hives  - PT/OT recommending rehab  - Will need to follow-up with Neurosurgery outpatient for incidental aneurysm   - Neurology evaluated    Dyspnea - resolved  - Patient feels like she is \"breathing through a screen\" on 5/6  - CXR negative for acute process  - Duo-nebs     Essential hypertension  Hypotension - improved  - Continue amlodipine  - Hold atenolol for soft/hypotensive BP and bradycardia     HLD  - Continue statin     Diabetes mellitus type 2  - Continue SSI     Severe LBP  Hx of surgical reconstruction, pain pump implantation  - Patient reports she takes daily Percocet in addition to pain pump (verified in Rx fill hx), follows with Dr. Egan  - Continue home gabapentin  - Patient would like lidocaine patches at DC     UA concerning for yeast  - Received 2 doses of Diflucan for initial urine showing yeast  - Culture with mixed dhiraj, no antibiotics or antifungals for now    Toe pain  Gout  - Pain in right 2nd toe and left 5th toe, patient declines Xray  - Continue allopurinol    Nicotine dependence  - Patient reports vaping prior to admission  - Agreeable to NRT      Expected Discharge Location and Transportation: [facility], medically ready  Expected Discharge   Expected Discharge Date: 5/9/2025; Expected Discharge Time:      VTE Prophylaxis:  Mechanical VTE prophylaxis orders are present.         AM-PAC 6 Clicks Score (PT): 13 (05/07/25 0800)    CODE STATUS:   Code Status and Medical Interventions: CPR (Attempt to Resuscitate); Full Support   Ordered at: 04/30/25 2036     Code Status (Patient has no pulse and is not breathing):    CPR (Attempt to Resuscitate)     Medical Interventions (Patient has pulse or is breathing):    " Full Support     Level Of Support Discussed With:    Patient       Zuleika Juan J Foss PA-C  05/08/25

## 2025-05-08 NOTE — THERAPY TREATMENT NOTE
Acute Care - Speech Language Pathology Treatment Note  [facility]     Patient Name: Phoebe Call  : 1955  MRN: [id number]  Today's Date: 2025               Admit Date: 2025     Visit Dx:    ICD-10-CM ICD-9-CM   1. Acute right-sided weakness  R53.1 728.87   2. Right sided numbness  R20.0 782.0   3. Dysarthria  R47.1 784.51   4. History of CVA (cerebrovascular accident)  Z86.73 V12.54   5. History of rheumatoid arthritis  Z87.39 V13.4   6. History of diabetes mellitus  Z86.39 V12.29   7. History of hypertension  Z86.79 V12.59   8. Cognitive communication deficit  R41.841 799.52   9. Stroke-like symptoms  R29.90 781.99     Patient Active Problem List   Diagnosis    Stroke-like symptoms    Benign essential hypertension    Chronic obstructive pulmonary disease    Chronic pain disorder    Diabetic peripheral neuropathy associated with type 2 diabetes mellitus    Gastroesophageal reflux disease without esophagitis    Hyperlipidemia    Hypothyroidism    CVA (cerebral vascular accident)     Past Medical History:   Diagnosis Date    Diabetes     History of transfusion     [facility], no reaction    Hypertension     RA (rheumatoid arthritis)      Past Surgical History:   Procedure Laterality Date    BLADDER SUSPENSION      HERNIA REPAIR      HYSTERECTOMY      MASTOID SURGERY      PAIN PUMP INSERTION/REVISION N/A 2024    Procedure: INTRATHECAL CATHETER AND PAIN PUMP IMPLANT;  Surgeon: Cristi Egan MD;  Location: [facility];  Service: Pain Management;  Laterality: N/A;       SLP Recommendation and Plan                    Anticipated Discharge Disposition (SLP): No further SLP services warranted (25 1015)        Therapy Frequency (SLP SLC): evaluation only (25 1015)           Daily Summary of Progress (SLP): progress toward functional goals is good, prepare for discharge (25 1015)           Treatment Assessment (SLP): improved, cognitive-linguistic disorder, communication disorder  (05/08/25 1015)  Treatment Assessment Comments (SLP): Pt now appears to be functioning at baseline.  She is tangential but she can be redirected and stay on task.  Successful with all cognitive communication tasks.  Improved attention, problem solving, and orientation. No further ST services are indicated at this time. (05/08/25 1015)  Plan for Continued Treatment (SLP): treatment no longer indicated as all goals met (05/08/25 1015)  Outcome Evaluation: Improved cognitive communication. Suspect pt is now back to her baseline re: communication- cognition- organization, problem solving, reasoning.  She endorses she feels better now than she has felt in months.  SLP to sign off. (05/08/25 1020)      SLP EVALUATION (Last 72 Hours)       SLP SLC Evaluation       Row Name 05/08/25 1015                   Communication Assessment/Intervention    Document Type therapy note (daily note)  -ML        Subjective Information no complaints  -ML        Patient Observations alert;cooperative  -ML        Patient/Family/Caregiver Comments/Observations No family present  -ML        Patient Effort good  -ML        Comment Pt with dx of ya yarbrough- - highest grade completed was 9th.  -ML        Symptoms Noted During/After Treatment none  -ML           General Information    Patient Profile Reviewed yes  -ML        Pertinent History Of Current Problem See ST hx- pt with limited education- quit school after 9th grade- mother assisted with raising children as mom did not feel she was capable- hx of ya yarbrough  -ML           Pain    Pretreatment Pain Rating 0/10 - no pain  -ML        Posttreatment Pain Rating 0/10 - no pain  -ML           SLP Treatment Clinical Impressions    Treatment Assessment (SLP) improved;cognitive-linguistic disorder;communication disorder  -ML        Treatment Assessment Comments (SLP) Pt now appears to be functioning at baseline.  She is tangential but she can be redirected and stay on task.  Successful  with all cognitive communication tasks.  Improved attention, problem solving, and orientation. No further ST services are indicated at this time.  -ML        Daily Summary of Progress (SLP) progress toward functional goals is good;prepare for discharge  -ML        Barriers to Overall Progress (SLP) Baseline deficits  -ML        Plan for Continued Treatment (SLP) treatment no longer indicated as all goals met  -ML        Care Plan Review care plan/treatment goals reviewed  -ML           Recommendations    Therapy Frequency (SLP SLC) evaluation only  -ML        Anticipated Discharge Disposition (SLP) No further SLP services warranted  -ML                  User Key  (r) = Recorded By, (t) = Taken By, (c) = Cosigned By      Initials Name Effective Dates    ML Elly Coe, MS CCC-SLP 08/30/24 -                        EDUCATION  The patient has been educated in the following areas:     Cognitive Impairment Communication Impairment.           SLP GOALS       Row Name 05/08/25 1015             Patient will demonstrate functional cognitive-linguistic skills for return to discharge environment    [place] with minimal cues  -ML      Time frame 1 week  -ML      Barriers baseline deficits  -ML      Progress/Outcomes goal met  -ML      Comments Appears to be functioning at baseline.  -ML         Attention Goal 1 (SLP)    Improve Attention by Goal 1 (SLP) complete sustained attention task;100%;independently (over 90% accuracy)  -ML      Time Frame (Attention Goal 1, SLP) 1 week  -ML      Progress (Attention Goal 1, SLP) 90%;with minimal cues (75-90%)  -ML      Progress/Outcomes (Attention Goal 1, SLP) goal met  -ML      Comment (Attention Goal 1, SLP) No difficulty with a variety of attention tasks from simple to moderately complex.  -ML         Orientation Goal 1 (SLP)    Improve Orientation Through Goal 1 (SLP) demonstrating orientation to month;demonstrating orientation to year;demonstrating orientation to  "place;demonstrating orientation to disease/impairment;100%;independently (over 90% accuracy)  -ML      Time Frame (Orientation Goal 1, SLP) 1 week  -ML      Progress (Orientation Goal 1, SLP) 100%;independently (over 90% accuracy)  -ML      Progress/Outcomes (Orientation Goal 1, SLP) goal met  -ML         Functional Problem Solving Skills Goal 1 (SLP)    Improve Problem Solving Through Goal 1 (SLP) determine solutions to simple ADL/safety problems;answer \"what if\" questions;100%;independently (over 90% accuracy)  -ML      Time Frame (Problem Solving Goal 1, SLP) 1 week  -ML      Progress (Problem Solving Goal 1, SLP) 90%;independently (over 90% accuracy)  -ML      Progress/Outcomes (Problem Solving Goal 1, SLP) goal met  -ML      Comment (Problem Solving Goal 1, SLP) No difficulty with functional activities/tasks.  -ML         Executive Functional Skills Goal 1 (SLP)    Improve Executive Function Skills Goal 1 (SLP) home management activity;demonstrate awareness of deficit;identify strategies, strengths, limitations;perform self-correction;perform self-evaluation;80%;with minimal cues (75-90%)  -ML      Time Frame (Executive Function Skills Goal 1, SLP) 1 week  -ML      Progress/Outcomes (Executive Function Skills Goal 1, SLP) goal met  -ML      Comment (Executive Function Skills Goal 1, SLP) Discussion re: baseline- safety, problem solving, planning- appears to be at baseline.  -ML                User Key  (r) = Recorded By, (t) = Taken By, (c) = Cosigned By      Initials Name Provider Type    Elly Hays MS CCC-SLP Speech and Language Pathologist                              Time Calculation:      Time Calculation- SLP       Row Name 05/08/25 1020             Time Calculation- SLP    SLP Start Time 1020  -ML      SLP Received On 05/08/25  -ML                User Key  (r) = Recorded By, (t) = Taken By, (c) = Cosigned By      Initials Name Provider Type    Elly Hays MS CCC-SLP Speech and " Language Pathologist                    Therapy Charges for Today       Code Description Service Date Service Provider Modifiers Qty    [id number] [facility] TREATMENT SPEECH 3 5/8/2025 Elly Coe, MS CCC-SLP GN 1                       Elly Coe, MS CCC-SLP  5/8/2025

## 2025-05-09 VITALS
BODY MASS INDEX: 25.68 KG/M2 | DIASTOLIC BLOOD PRESSURE: 70 MMHG | RESPIRATION RATE: 18 BRPM | HEART RATE: 83 BPM | TEMPERATURE: 97.5 F | HEIGHT: 61 IN | OXYGEN SATURATION: 94 % | WEIGHT: 136 LBS | SYSTOLIC BLOOD PRESSURE: 125 MMHG

## 2025-05-09 LAB
ANION GAP SERPL CALCULATED.3IONS-SCNC: 13 MMOL/L (ref 5–15)
BUN SERPL-MCNC: 15 MG/DL (ref 8–23)
BUN/CREAT SERPL: 36.6 (ref 7–25)
CALCIUM SPEC-SCNC: 10.1 MG/DL (ref 8.6–10.5)
CHLORIDE SERPL-SCNC: 96 MMOL/L (ref 98–107)
CO2 SERPL-SCNC: 25 MMOL/L (ref 22–29)
CREAT SERPL-MCNC: 0.41 MG/DL (ref 0.57–1)
EGFRCR SERPLBLD CKD-EPI 2021: 107.3 ML/MIN/1.73
GLUCOSE BLDC GLUCOMTR-MCNC: 177 MG/DL (ref 70–130)
GLUCOSE BLDC GLUCOMTR-MCNC: 336 MG/DL (ref 70–130)
GLUCOSE SERPL-MCNC: 194 MG/DL (ref 65–99)
POTASSIUM SERPL-SCNC: 4.7 MMOL/L (ref 3.5–5.2)
SODIUM SERPL-SCNC: 134 MMOL/L (ref 136–145)

## 2025-05-09 PROCEDURE — 82948 REAGENT STRIP/BLOOD GLUCOSE: CPT

## 2025-05-09 PROCEDURE — 99239 HOSP IP/OBS DSCHRG MGMT >30: CPT | Performed by: INTERNAL MEDICINE

## 2025-05-09 PROCEDURE — 80048 BASIC METABOLIC PNL TOTAL CA: CPT

## 2025-05-09 PROCEDURE — 63710000001 INSULIN LISPRO (HUMAN) PER 5 UNITS: Performed by: STUDENT IN AN ORGANIZED HEALTH CARE EDUCATION/TRAINING PROGRAM

## 2025-05-09 PROCEDURE — 63710000001 DIPHENHYDRAMINE PER 50 MG: Performed by: INTERNAL MEDICINE

## 2025-05-09 RX ORDER — IPRATROPIUM BROMIDE AND ALBUTEROL SULFATE 2.5; .5 MG/3ML; MG/3ML
3 SOLUTION RESPIRATORY (INHALATION) EVERY 6 HOURS PRN
Status: DISCONTINUED | OUTPATIENT
Start: 2025-05-09 | End: 2025-05-09 | Stop reason: HOSPADM

## 2025-05-09 RX ORDER — OXYCODONE AND ACETAMINOPHEN 10; 325 MG/1; MG/1
1 TABLET ORAL DAILY
COMMUNITY

## 2025-05-09 RX ORDER — GABAPENTIN 400 MG/1
800 CAPSULE ORAL 3 TIMES DAILY
COMMUNITY

## 2025-05-09 RX ORDER — CYCLOBENZAPRINE HCL 10 MG
10 TABLET ORAL 2 TIMES DAILY PRN
COMMUNITY

## 2025-05-09 RX ORDER — LEVOTHYROXINE SODIUM 137 UG/1
137 TABLET ORAL
COMMUNITY

## 2025-05-09 RX ORDER — ALPRAZOLAM 0.5 MG
0.5 TABLET ORAL 3 TIMES DAILY PRN
Start: 2025-05-09

## 2025-05-09 RX ORDER — CLOPIDOGREL BISULFATE 75 MG/1
75 TABLET ORAL DAILY
Qty: 30 TABLET | Refills: 0 | Status: SHIPPED | OUTPATIENT
Start: 2025-05-09 | End: 2025-06-08

## 2025-05-09 RX ORDER — SIMVASTATIN 20 MG
20 TABLET ORAL NIGHTLY
COMMUNITY
End: 2025-05-09 | Stop reason: HOSPADM

## 2025-05-09 RX ORDER — ALPRAZOLAM 0.5 MG
0.5 TABLET ORAL 3 TIMES DAILY
COMMUNITY

## 2025-05-09 RX ORDER — RIZATRIPTAN BENZOATE 10 MG/1
10 TABLET ORAL ONCE AS NEEDED
COMMUNITY

## 2025-05-09 RX ORDER — ATORVASTATIN CALCIUM 40 MG/1
40 TABLET, FILM COATED ORAL NIGHTLY
Start: 2025-05-09

## 2025-05-09 RX ADMIN — GABAPENTIN 600 MG: 300 CAPSULE ORAL at 13:03

## 2025-05-09 RX ADMIN — NICOTINE 1 PATCH: 14 PATCH, EXTENDED RELEASE TRANSDERMAL at 08:54

## 2025-05-09 RX ADMIN — DIPHENHYDRAMINE HYDROCHLORIDE 25 MG: 25 CAPSULE ORAL at 10:26

## 2025-05-09 RX ADMIN — FLUTICASONE PROPIONATE 2 SPRAY: 50 SPRAY, METERED NASAL at 08:54

## 2025-05-09 RX ADMIN — ALLOPURINOL 100 MG: 100 TABLET ORAL at 08:56

## 2025-05-09 RX ADMIN — ROPINIROLE 1 MG: 1 TABLET, FILM COATED ORAL at 08:56

## 2025-05-09 RX ADMIN — Medication 10 ML: at 08:55

## 2025-05-09 RX ADMIN — MICONAZOLE NITRATE 1 APPLICATION: 20 POWDER TOPICAL at 08:54

## 2025-05-09 RX ADMIN — INSULIN LISPRO 7 UNITS: 100 INJECTION, SOLUTION INTRAVENOUS; SUBCUTANEOUS at 12:16

## 2025-05-09 RX ADMIN — BISMUTH SUBSALICYLATE 30 ML: 525 LIQUID ORAL at 10:26

## 2025-05-09 RX ADMIN — AMLODIPINE BESYLATE 5 MG: 5 TABLET ORAL at 08:56

## 2025-05-09 RX ADMIN — OXYCODONE HYDROCHLORIDE AND ACETAMINOPHEN 1 TABLET: 10; 325 TABLET ORAL at 08:55

## 2025-05-09 RX ADMIN — GABAPENTIN 600 MG: 300 CAPSULE ORAL at 06:26

## 2025-05-09 RX ADMIN — LIDOCAINE 2 PATCH: 4 PATCH TOPICAL at 00:09

## 2025-05-09 RX ADMIN — INSULIN LISPRO 2 UNITS: 100 INJECTION, SOLUTION INTRAVENOUS; SUBCUTANEOUS at 08:55

## 2025-05-09 RX ADMIN — BACLOFEN 10 MG: 10 TABLET ORAL at 08:56

## 2025-05-09 RX ADMIN — ALPRAZOLAM 0.5 MG: 0.5 TABLET ORAL at 08:56

## 2025-05-09 RX ADMIN — BUSPIRONE HYDROCHLORIDE 30 MG: 10 TABLET ORAL at 08:56

## 2025-05-09 RX ADMIN — CLOPIDOGREL BISULFATE 75 MG: 75 TABLET, FILM COATED ORAL at 08:56

## 2025-05-09 RX ADMIN — ALPRAZOLAM 0.5 MG: 0.5 TABLET ORAL at 00:08

## 2025-05-09 RX ADMIN — CLOTRIMAZOLE AND BETAMETHASONE DIPROPIONATE: 10; .5 CREAM TOPICAL at 08:55

## 2025-05-09 RX ADMIN — Medication 5 MG: at 00:44

## 2025-05-09 NOTE — DISCHARGE PLACEMENT REQUEST
"Phoebe Call (70 y.o. Female)       Date of Birth   1955    Social Security Number       Address   [address]  [facility] [address]    Home Phone   [phone]    MRN   [id number]       Sabianism   Amish    Marital Status   Single                            Admission Date   4/30/2025    Admission Type   Emergency    Admitting Provider   Araseli Polanco MD    Attending Provider   Araseli Polanco MD    Department, Room/Bed   [facility] 3F, S318/1       Discharge Date       Discharge Disposition   Rehab Facility or Unit (DC - External)    Discharge Destination                                 Attending Provider: Araseli Polanco MD    Allergies: Cymbalta [Duloxetine Hcl], Norco [Hydrocodone-acetaminophen], Amoxicillin-pot Clavulanate, Aspirin, Ibuprofen    Isolation: None   Infection: None   Code Status: CPR    Ht: 154.9 cm (61\")   Wt: 61.7 kg (136 lb)    Admission Cmt: None   Principal Problem: CVA (cerebral vascular accident) [I63.9]                   Active Insurance as of 4/30/2025       Primary Coverage       Payor Plan Insurance Group Employer/Plan Group    HUMANA MEDICARE REPLACEMENT HUMANA MEDICARE ADVANTAGE [facility] HMO [id number]       Payor Plan Address Payor Plan Phone Number Payor Plan Fax Number Effective Dates    [address] [address] [phone]  1/1/2024 - None Entered    [facility] [address]         Subscriber Name Subscriber Birth Date Member ID       PHOEBE CALL 1955 [id number]               Secondary Coverage       Payor Plan Insurance Group Employer/Plan Group    [id number]        Payor Plan Address Payor Plan Phone Number Payor Plan Fax Number Effective Dates    [address]   6/21/2024 - None Entered    [facility] [address]         Subscriber Name Subscriber Birth Date Member ID       PHOEBE CALL 1955 [id number]                     Emergency Contacts        (Rel.) Home Phone Work Phone Mobile Phone    Marilou Call " (Sister) -- -- [phone]                 Discharge Summary        Araseli Polanco MD at 25 1244              [facility] Medicine Services  DISCHARGE SUMMARY    Patient Name: Phoebe Call  : 1955  MRN: [id number]    Date of Admission: 2025  3:43 PM  Date of Discharge:  2025  Primary Care Physician: Provider, No Known    Consults       Date and Time Order Name Status Description    2025  3:43 PM Inpatient Neurology Consult Stroke Completed             Hospital Course     Presenting Problem: suspected CVA (unable to obtain MRI to confirm)    Active Hospital Problems    Diagnosis  POA    **CVA (cerebral vascular accident) [I63.9]  Yes    Chronic pain disorder [G89.4]  Yes    Diabetic peripheral neuropathy associated with type 2 diabetes mellitus [E11.42]  Yes    Gastroesophageal reflux disease without esophagitis [K21.9]  Yes    Hyperlipidemia [E78.5]  Yes    Hypothyroidism [E03.9]  Yes    Benign essential hypertension [I10]  Yes    Chronic obstructive pulmonary disease [J44.9]  Yes      Resolved Hospital Problems   No resolved problems to display.          Hospital Course:  Phoebe Call is a 70 y.o. female w HTN, DMII, h/o CVA, back pain pump w chronic pain who presented w right-sided weakness and slurred speech. Stroke alert called    Stroke evaluation included CT/CTA but could not complete MRI given back pain pump not compatible. Symptoms most consistent with probable left hemispheric infarct. Plavix daily at discharge per neurology-stroke team. 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 Ambulatory referral placed.    Chronic pain issues - s/p pain pump, follows w Dr Egan. Home regimen duplicated here    Found appropriate for [facility] rehab discharge     Discharge Follow Up Recommendations for outpatient labs/diagnostics:   Neurosurgery referral for incidental aneurysm  findings   F/u neurology stroke in 4-6 weeks   PCP 1 week from discharge    Day of Discharge     HPI:   Doing well - asks me to put up 4 point in her bed due to fear of falling  No foot pain c/w gout  Chronic left knee pain that is nonspecific on review    Vital Signs:   Temp:  [97.5 °F (36.4 °C)-98.6 °F (37 °C)] 97.5 °F (36.4 °C)  Heart Rate:  [83-87] 83  Resp:  [16-20] 18  BP: (107-128)/(60-86) 125/70      Physical Exam:  Constitutional: No acute distress, awake, alert frail female lying in bed  Respiratory: Clear to auscultation bilaterally, respiratory effort normal   Cardiovascular: RRR, no murmurs, rubs, or gallops  Gastrointestinal: Soft, nontender, nondistended  Musculoskeletal: Muscle tone decreased throughout  Psychiatric: Appropriate affect, cooperative  Neurologic: Alert and oriented, no facial assymetry weak throughout all 4 extremities but moreso with limited move against gravity on right side, speech clear  Skin: No rashes    Pertinent  and/or Most Recent Results     LAB RESULTS:      Lab 05/06/25  1534 05/05/25  0747   WBC 8.38 7.34   HEMOGLOBIN 12.5 14.9   HEMATOCRIT 38.9 46.2   PLATELETS 304 315   NEUTROS ABS 4.89 4.22   IMMATURE GRANS (ABS) 0.02 0.02   LYMPHS ABS 2.56 2.22   MONOS ABS 0.65 0.51   EOS ABS 0.22 0.34   MCV 85.1 85.2         Lab 05/09/25  0739 05/06/25  1534 05/05/25  0747   SODIUM 134* 133* 134*   POTASSIUM 4.7 4.4 3.8   CHLORIDE 96* 97* 95*   CO2 25.0 26.0 28.0   ANION GAP 13.0 10.0 11.0   BUN 15 19 13   CREATININE 0.41* 0.40* 0.46*   EGFR 107.3 108.0 104.4   GLUCOSE 194* 120* 200*   CALCIUM 10.1 9.8 11.1*   PHOSPHORUS  --   --  3.3         Lab 05/05/25  0747   ALBUMIN 4.6                     Brief Urine Lab Results  (Last result in the past 365 days)        Color   Clarity   Blood   Leuk Est   Nitrite   Protein   CREAT   Urine HCG        05/01/25 2114 Yellow   Clear   Negative   Moderate (2+)   Negative   Trace                 Microbiology Results (last 10 days)       Procedure  Component Value - Date/Time    Urine Culture - Urine, Urine, Clean Catch [[id number]] Collected: 05/01/25 2114    Lab Status: Final result Specimen: Urine, Clean Catch Updated: 05/03/25 1237     Urine Culture >100,000 CFU/mL Mixed Ashley Isolated    Narrative:      Specimen contains mixed organisms of questionable pathogenicity suggestive of contamination. If symptoms persist, suggest recollection.  Colonization of the urinary tract without infection is common. Treatment is discouraged unless the patient is symptomatic, pregnant, or undergoing an invasive urologic procedure.            XR Chest 1 View  Result Date: 5/6/2025  XR CHEST 1 VW Date of Exam: 5/6/2025 12:36 PM EDT Indication: shortness of breath Comparison: April 30, 2025 Findings: The lungs are clear. The heart and mediastinal contours appear normal. There is no pleural effusion. The pulmonary vasculature appears normal. The osseous structures appear intact.     Impression: No acute cardiopulmonary process. Electronically Signed: Cristi Be MD  5/6/2025 1:23 PM EDT  Workstation ID: [id number]    CT Head Without Contrast  Result Date: 5/2/2025  CT HEAD WO CONTRAST Date of Exam: 5/2/2025 11:49 AM EDT Indication: stroke follow up, unable to obtain MRI. Comparison: April 30, 2025 Technique: Axial CT images were obtained of the head without contrast administration.  Automated exposure control and iterative construction methods were used. Findings: No acute intracranial hemorrhage or extra-axial collection is identified. The ventricles appear normal in caliber, with no evidence of mass effect or midline shift. The basal cisterns appear patent. The gray-white differentiation appears preserved. The calvarium appear intact. There is mild paranasal sinus mucosal disease. The mastoid air cells are well-aerated. Degenerative changes appear stable.     Impression: 1.No acute intracranial process identified. 2.Mild paranasal sinus mucosal disease. Electronically  Signed: Cristi Be MD  5/2/2025 12:16 PM EDT  Workstation ID: [id number]    XR Chest 1 View  Result Date: 4/30/2025  XR CHEST 1 VW Date of Exam: 4/30/2025 4:52 PM EDT Indication: Acute Stroke Protocol (onset < 12 hrs) Comparison: CXR 10/15/2012 Findings: The heart is normal in size. The lungs are well-expanded. Subsegmental atelectasis and/or linear fibrosis is seen at the right lung base. No consolidation or mass is seen. The left lung apex is obscured by facial structures. Bony structures appear intact.     Impression: Subsegmental atelectasis and/are linear fibrosis is seen at the right lung base. Electronically Signed: Bj Champagne MD  4/30/2025 5:21 PM EDT  Workstation ID: [id number]    CT Angiogram Head w AI Analysis of LVO  Result Date: 4/30/2025  CT ANGIOGRAM HEAD W AI ANALYSIS OF LVO, CT ANGIOGRAM NECK Date of Exam: 4/30/2025 4:28 PM EDT Indication: Neuro Deficit, acute, Stroke suspected Neuro deficit, acute stroke suspected. Comparison: None available. Technique: CTA of the head and neck was performed after the uneventful intravenous administration of 90 mL Isovue-370. Reconstructed coronal and sagittal images were also obtained. In addition, a 3-D volume rendered image was created for interpretation. Automated exposure control and iterative reconstruction methods were used. Findings: The lung apices are clear. Evaluation of the neck soft tissues demonstrates no evidence of aerodigestive tract mass or pathologic cervical lymphadenopathy. Multilevel cervical spondylosis is present, without evidence of acute fracture or aggressive osseous lesion. Patent aortic arch with typical three-vessel branching. The subclavian arteries are patent bilaterally. Some calcific atherosclerotic change is present at the right ICA origin with mild, less than 50% stenosis present by NASCET criteria. Similar mild narrowing is present on the left. The vertebral arteries are normal in course and caliber bilaterally.  Intracranially, the carotid siphons demonstrate calcific atherosclerotic changes with some mild to moderate narrowing of the cavernous and  ophthalmic segments. The anterior cerebral arteries appear patent. The right and left middle cerebral arteries demonstrate no evidence of high-grade stenosis or occlusion. There is a saccular aneurysm present involving the left MCA bifurcation, inferiorly projecting and measuring 3 mm at the neck, 5 mm in greatest transverse dimension and 7 mm neck to dome. The vertebrobasilar system is patent. The posterior cerebral arteries are normal in course and caliber bilaterally.     Impression: Mild cervical and intracranial atherosclerotic changes are present as above, without evidence of associated high-grade stenosis or large vessel occlusion. 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 Electronically Signed: Barney Valle MD  4/30/2025 4:51 PM EDT  Workstation ID: [id number]    CT Angiogram Neck  Result Date: 4/30/2025  CT ANGIOGRAM HEAD W AI ANALYSIS OF LVO, CT ANGIOGRAM NECK Date of Exam: 4/30/2025 4:28 PM EDT Indication: Neuro Deficit, acute, Stroke suspected Neuro deficit, acute stroke suspected. Comparison: None available. Technique: CTA of the head and neck was performed after the uneventful intravenous administration of 90 mL Isovue-370. Reconstructed coronal and sagittal images were also obtained. In addition, a 3-D volume rendered image was created for interpretation. Automated exposure control and iterative reconstruction methods were used. Findings: The lung apices are clear. Evaluation of the neck soft tissues demonstrates no evidence of aerodigestive tract mass or pathologic cervical lymphadenopathy. Multilevel cervical spondylosis is present, without evidence of acute fracture or aggressive osseous lesion. Patent aortic arch with typical  three-vessel branching. The subclavian arteries are patent bilaterally. Some calcific atherosclerotic change is present at the right ICA origin with mild, less than 50% stenosis present by NASCET criteria. Similar mild narrowing is present on the left. The vertebral arteries are normal in course and caliber bilaterally. Intracranially, the carotid siphons demonstrate calcific atherosclerotic changes with some mild to moderate narrowing of the cavernous and  ophthalmic segments. The anterior cerebral arteries appear patent. The right and left middle cerebral arteries demonstrate no evidence of high-grade stenosis or occlusion. There is a saccular aneurysm present involving the left MCA bifurcation, inferiorly projecting and measuring 3 mm at the neck, 5 mm in greatest transverse dimension and 7 mm neck to dome. The vertebrobasilar system is patent. The posterior cerebral arteries are normal in course and caliber bilaterally.     Impression: Mild cervical and intracranial atherosclerotic changes are present as above, without evidence of associated high-grade stenosis or large vessel occlusion. 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 Electronically Signed: Barney Valle MD  4/30/2025 4:51 PM EDT  Workstation ID: [id number]    CT Head Without Contrast Stroke Protocol  Result Date: 4/30/2025  CT HEAD WO CONTRAST STROKE PROTOCOL Date of Exam: 4/30/2025 3:48 PM EDT Indication: Neuro deficit, acute, stroke suspected Neuro Deficit, acute, Stroke suspected. Right arm weakness Comparison: None available. Technique: Axial CT images were obtained of the head without contrast administration.  Reconstructed coronal images were also obtained. Automated exposure control and iterative construction methods were used. Scan Time: 1619 hours Results discussed with Viktoria at 1645 hours. Findings: The  ventricles are mildly and diffusely prominent. Sulci and cerebellar folia are not abnormally prominent. Ill-defined diminished density in cerebral white matter is consistent with mild gliosis and/or scattered old lacunar infarcts. No diminished density is seen in immediate periventricular white matter to suggest transependymal CSF. There is no CT evidence of acute intracranial hemorrhage, mass, or mass effect. No abnormality of the orbits is evident. The paranasal sinuses are well aerated. Mild mucosal thickening is seen in the left maxillary antrum. Nasal antral windows are widely patent. The middle ears are well aerated. Mastoidectomy defect on the left is well aerated.     Impression: CT scan of the head without IV contrast demonstrating mild prominence of the ventricles which may be due to atrophy or mild ventricular hypertension. Mild white matter changes consistent with gliosis and/or scattered old lacunar infarcts. No acute intracranial abnormality is seen. Electronically Signed: Bj Champagne MD  4/30/2025 4:48 PM EDT  Workstation ID: [id number]              Results for orders placed during the hospital encounter of 04/30/25    Adult Transthoracic Echo Complete W/ Cont if Necessary Per Protocol (With Agitated Saline)    Interpretation Summary    Left ventricular ejection fraction appears to be 66 - 70%.    Left ventricular wall thickness is consistent with borderline basal asymmetric hypertrophy.  Mild gradient in the LV cavity seen which at best is 16 mmHg    Left ventricular diastolic function is consistent with (grade I) impaired relaxation.    The left atrial cavity is mildly dilated.    Left atrial volume is mildly increased.    Saline test results are negative.    Estimated right ventricular systolic pressure from tricuspid regurgitation is normal (<35 mmHg).      Plan for Follow-up of Pending Labs/Results:     Discharge Details        Discharge Medications        New Medications        Instructions  Start Date   atorvastatin 40 MG tablet  Commonly known as: LIPITOR   40 mg, Oral, Nightly      clopidogrel 75 MG tablet  Commonly known as: PLAVIX   75 mg, Oral, Daily      naloxone 4 MG/0.1ML nasal spray  Commonly known as: NARCAN   Call 911. Don't prime. [place] in 1 nostril for overdose. Repeat in 2-3 minutes in other nostril if no or minimal breathing/responsiveness.             Changes to Medications        Instructions Start Date   ALPRAZolam 0.5 MG tablet  Commonly known as: XANAX  What changed:   when to take this  reasons to take this   0.5 mg, Oral, 3 Times Daily PRN             Continue These Medications        Instructions Start Date   albuterol sulfate  (90 Base) MCG/ACT inhaler  Commonly known as: PROVENTIL HFA;VENTOLIN HFA;PROAIR HFA   INHALE 2 PUFFS BY MOUTH EVERY 4 TO 6 HOURS AS NEEDED      allopurinol 100 MG tablet  Commonly known as: ZYLOPRIM   100 mg, 2 Times Daily      amLODIPine 5 MG tablet  Commonly known as: NORVASC   5 mg, Daily      baclofen 10 MG tablet  Commonly known as: LIORESAL   10 mg, 3 Times Daily      busPIRone 10 MG tablet  Commonly known as: BUSPAR   3 tablets, 2 Times Daily      clotrimazole-betamethasone 1-0.05 % cream  Commonly known as: LOTRISONE   APPLY TO THE AFFECTED AND SURROUNDING AREAS OF SKIN BY TOPICAL ROUTE 2 TIMES PER DAY IN THE MORNING AND EVENING FOR 2 WEEKS      gabapentin 600 MG tablet  Commonly known as: NEURONTIN   600 mg, 3 Times Daily      metFORMIN 500 MG tablet  Commonly known as: GLUCOPHAGE   500 mg, 2 Times Daily With Meals      oxyCODONE-acetaminophen  MG per tablet  Commonly known as: PERCOCET   1 tablet, Oral, Daily      rOPINIRole 1 MG tablet  Commonly known as: REQUIP   1 mg, 3 Times Daily             Stop These Medications      atenolol 25 MG tablet  Commonly known as: TENORMIN     diphenoxylate-atropine 2.5-0.025 MG per tablet  Commonly known as: LOMOTIL     erythromycin 5 MG/GM ophthalmic ointment  Commonly known as: ROMYCIN      Gemtesa 75 MG tablet  Generic drug: Vibegron     Myrbetriq 50 MG tablet sustained-release 24 hour 24 hr tablet  Generic drug: Mirabegron ER     promethazine 25 MG tablet  Commonly known as: PHENERGAN              Allergies   Allergen Reactions    Cymbalta [Duloxetine Hcl] Swelling     Blisters    Norco [Hydrocodone-Acetaminophen] Anaphylaxis    Amoxicillin-Pot Clavulanate Diarrhea     Pt gets a yeast infection.    Aspirin Hives    Ibuprofen Rash         Discharge Disposition:      Diet:  Hospital:  Diet Order   Procedures    Diet: Cardiac, Diabetic; Healthy Heart (2-3 Na+); Consistent Carbohydrate; Texture: Regular (IDDSI 7); Fluid Consistency: Thin (IDDSI 0)            Activity:      Restrictions or Other Recommendations:         CODE STATUS:    Code Status and Medical Interventions: CPR (Attempt to Resuscitate); Full Support   Ordered at: 04/30/25 2036     Code Status (Patient has no pulse and is not breathing):    CPR (Attempt to Resuscitate)     Medical Interventions (Patient has pulse or is breathing):    Full Support     Level Of Support Discussed With:    Patient       Future Appointments   Date Time Provider Department Center   6/4/2025  9:00 AM [facility]       Additional Instructions for the Follow-ups that You Need to Schedule       Ambulatory Referral to Neurology   As directed      4-6 weeks from discharge                  Araseli Polanco MD  05/09/25      Time Spent on Discharge:  I spent  40  minutes on this discharge activity which included: face-to-face encounter with the patient, reviewing the data in the system, coordination of the care with the nursing staff as well as consultants, documentation, and entering orders.    Significant med rec work, d/w pharmacy        Electronically signed by Araseli Polanco MD at 05/09/25 8996

## 2025-05-09 NOTE — DISCHARGE SUMMARY
[facility] Medicine Services  DISCHARGE SUMMARY    Patient Name: Phoebe Call  : 1955  MRN: [id number]    Date of Admission: 2025  3:43 PM  Date of Discharge:  2025  Primary Care Physician: Provider, No Known    Consults       Date and Time Order Name Status Description    2025  3:43 PM Inpatient Neurology Consult Stroke Completed             Hospital Course     Presenting Problem: suspected CVA (unable to obtain MRI to confirm)    Active Hospital Problems    Diagnosis  POA    **CVA (cerebral vascular accident) [I63.9]  Yes    Chronic pain disorder [G89.4]  Yes    Diabetic peripheral neuropathy associated with type 2 diabetes mellitus [E11.42]  Yes    Gastroesophageal reflux disease without esophagitis [K21.9]  Yes    Hyperlipidemia [E78.5]  Yes    Hypothyroidism [E03.9]  Yes    Benign essential hypertension [I10]  Yes    Chronic obstructive pulmonary disease [J44.9]  Yes      Resolved Hospital Problems   No resolved problems to display.          Hospital Course:  Phoebe Call is a 70 y.o. female w HTN, DMII, h/o CVA, back pain pump w chronic pain who presented w right-sided weakness and slurred speech. Stroke alert called    Stroke evaluation included CT/CTA but could not complete MRI given back pain pump not compatible. Symptoms most consistent with probable left hemispheric infarct. Plavix daily at discharge per neurology-stroke team. 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 Ambulatory referral placed.    Chronic pain issues - s/p pain pump, follows w Dr Egan. Home regimen duplicated here    Found appropriate for [facility] rehab discharge     Discharge Follow Up Recommendations for outpatient labs/diagnostics:   Neurosurgery referral for incidental aneurysm findings   F/u neurology stroke in 4-6 weeks   PCP 1 week from discharge    Day of Discharge     HPI:   Doing well - asks  me to put up 4 point in her bed due to fear of falling  No foot pain c/w gout  Chronic left knee pain that is nonspecific on review    Vital Signs:   Temp:  [97.5 °F (36.4 °C)-98.6 °F (37 °C)] 97.5 °F (36.4 °C)  Heart Rate:  [83-87] 83  Resp:  [16-20] 18  BP: (107-128)/(60-86) 125/70      Physical Exam:  Constitutional: No acute distress, awake, alert frail female lying in bed  Respiratory: Clear to auscultation bilaterally, respiratory effort normal   Cardiovascular: RRR, no murmurs, rubs, or gallops  Gastrointestinal: Soft, nontender, nondistended  Musculoskeletal: Muscle tone decreased throughout  Psychiatric: Appropriate affect, cooperative  Neurologic: Alert and oriented, no facial assymetry weak throughout all 4 extremities but moreso with limited move against gravity on right side, speech clear  Skin: No rashes    Pertinent  and/or Most Recent Results     LAB RESULTS:      Lab 05/06/25  1534 05/05/25  0747   WBC 8.38 7.34   HEMOGLOBIN 12.5 14.9   HEMATOCRIT 38.9 46.2   PLATELETS 304 315   NEUTROS ABS 4.89 4.22   IMMATURE GRANS (ABS) 0.02 0.02   LYMPHS ABS 2.56 2.22   MONOS ABS 0.65 0.51   EOS ABS 0.22 0.34   MCV 85.1 85.2         Lab 05/09/25  0739 05/06/25  1534 05/05/25  0747   SODIUM 134* 133* 134*   POTASSIUM 4.7 4.4 3.8   CHLORIDE 96* 97* 95*   CO2 25.0 26.0 28.0   ANION GAP 13.0 10.0 11.0   BUN 15 19 13   CREATININE 0.41* 0.40* 0.46*   EGFR 107.3 108.0 104.4   GLUCOSE 194* 120* 200*   CALCIUM 10.1 9.8 11.1*   PHOSPHORUS  --   --  3.3         Lab 05/05/25  0747   ALBUMIN 4.6                     Brief Urine Lab Results  (Last result in the past 365 days)        Color   Clarity   Blood   Leuk Est   Nitrite   Protein   CREAT   Urine HCG        05/01/25 2114 Yellow   Clear   Negative   Moderate (2+)   Negative   Trace                 Microbiology Results (last 10 days)       Procedure Component Value - Date/Time    Urine Culture - Urine, Urine, Clean Catch [[id number]] Collected: 05/01/25 2114    Lab Status:  Final result Specimen: Urine, Clean Catch Updated: 05/03/25 1237     Urine Culture >100,000 CFU/mL Mixed Ashley Isolated    Narrative:      Specimen contains mixed organisms of questionable pathogenicity suggestive of contamination. If symptoms persist, suggest recollection.  Colonization of the urinary tract without infection is common. Treatment is discouraged unless the patient is symptomatic, pregnant, or undergoing an invasive urologic procedure.            XR Chest 1 View  Result Date: 5/6/2025  XR CHEST 1 VW Date of Exam: 5/6/2025 12:36 PM EDT Indication: shortness of breath Comparison: April 30, 2025 Findings: The lungs are clear. The heart and mediastinal contours appear normal. There is no pleural effusion. The pulmonary vasculature appears normal. The osseous structures appear intact.     Impression: No acute cardiopulmonary process. Electronically Signed: Cristi Be MD  5/6/2025 1:23 PM EDT  Workstation ID: [id number]    CT Head Without Contrast  Result Date: 5/2/2025  CT HEAD WO CONTRAST Date of Exam: 5/2/2025 11:49 AM EDT Indication: stroke follow up, unable to obtain MRI. Comparison: April 30, 2025 Technique: Axial CT images were obtained of the head without contrast administration.  Automated exposure control and iterative construction methods were used. Findings: No acute intracranial hemorrhage or extra-axial collection is identified. The ventricles appear normal in caliber, with no evidence of mass effect or midline shift. The basal cisterns appear patent. The gray-white differentiation appears preserved. The calvarium appear intact. There is mild paranasal sinus mucosal disease. The mastoid air cells are well-aerated. Degenerative changes appear stable.     Impression: 1.No acute intracranial process identified. 2.Mild paranasal sinus mucosal disease. Electronically Signed: Cristi Be MD  5/2/2025 12:16 PM EDT  Workstation ID: [id number]    XR Chest 1 View  Result Date: 4/30/2025  XR CHEST  1 VW Date of Exam: 4/30/2025 4:52 PM EDT Indication: Acute Stroke Protocol (onset < 12 hrs) Comparison: CXR 10/15/2012 Findings: The heart is normal in size. The lungs are well-expanded. Subsegmental atelectasis and/or linear fibrosis is seen at the right lung base. No consolidation or mass is seen. The left lung apex is obscured by facial structures. Bony structures appear intact.     Impression: Subsegmental atelectasis and/are linear fibrosis is seen at the right lung base. Electronically Signed: Bj Champagne MD  4/30/2025 5:21 PM EDT  Workstation ID: [id number]    CT Angiogram Head w AI Analysis of LVO  Result Date: 4/30/2025  CT ANGIOGRAM HEAD W AI ANALYSIS OF LVO, CT ANGIOGRAM NECK Date of Exam: 4/30/2025 4:28 PM EDT Indication: Neuro Deficit, acute, Stroke suspected Neuro deficit, acute stroke suspected. Comparison: None available. Technique: CTA of the head and neck was performed after the uneventful intravenous administration of 90 mL Isovue-370. Reconstructed coronal and sagittal images were also obtained. In addition, a 3-D volume rendered image was created for interpretation. Automated exposure control and iterative reconstruction methods were used. Findings: The lung apices are clear. Evaluation of the neck soft tissues demonstrates no evidence of aerodigestive tract mass or pathologic cervical lymphadenopathy. Multilevel cervical spondylosis is present, without evidence of acute fracture or aggressive osseous lesion. Patent aortic arch with typical three-vessel branching. The subclavian arteries are patent bilaterally. Some calcific atherosclerotic change is present at the right ICA origin with mild, less than 50% stenosis present by NASCET criteria. Similar mild narrowing is present on the left. The vertebral arteries are normal in course and caliber bilaterally. Intracranially, the carotid siphons demonstrate calcific atherosclerotic changes with some mild to moderate narrowing of the cavernous and   ophthalmic segments. The anterior cerebral arteries appear patent. The right and left middle cerebral arteries demonstrate no evidence of high-grade stenosis or occlusion. There is a saccular aneurysm present involving the left MCA bifurcation, inferiorly projecting and measuring 3 mm at the neck, 5 mm in greatest transverse dimension and 7 mm neck to dome. The vertebrobasilar system is patent. The posterior cerebral arteries are normal in course and caliber bilaterally.     Impression: Mild cervical and intracranial atherosclerotic changes are present as above, without evidence of associated high-grade stenosis or large vessel occlusion. 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 Electronically Signed: Barney Valle MD  4/30/2025 4:51 PM EDT  Workstation ID: [id number]    CT Angiogram Neck  Result Date: 4/30/2025  CT ANGIOGRAM HEAD W AI ANALYSIS OF LVO, CT ANGIOGRAM NECK Date of Exam: 4/30/2025 4:28 PM EDT Indication: Neuro Deficit, acute, Stroke suspected Neuro deficit, acute stroke suspected. Comparison: None available. Technique: CTA of the head and neck was performed after the uneventful intravenous administration of 90 mL Isovue-370. Reconstructed coronal and sagittal images were also obtained. In addition, a 3-D volume rendered image was created for interpretation. Automated exposure control and iterative reconstruction methods were used. Findings: The lung apices are clear. Evaluation of the neck soft tissues demonstrates no evidence of aerodigestive tract mass or pathologic cervical lymphadenopathy. Multilevel cervical spondylosis is present, without evidence of acute fracture or aggressive osseous lesion. Patent aortic arch with typical three-vessel branching. The subclavian arteries are patent bilaterally. Some calcific atherosclerotic change is present at the right ICA origin  with mild, less than 50% stenosis present by NASCET criteria. Similar mild narrowing is present on the left. The vertebral arteries are normal in course and caliber bilaterally. Intracranially, the carotid siphons demonstrate calcific atherosclerotic changes with some mild to moderate narrowing of the cavernous and  ophthalmic segments. The anterior cerebral arteries appear patent. The right and left middle cerebral arteries demonstrate no evidence of high-grade stenosis or occlusion. There is a saccular aneurysm present involving the left MCA bifurcation, inferiorly projecting and measuring 3 mm at the neck, 5 mm in greatest transverse dimension and 7 mm neck to dome. The vertebrobasilar system is patent. The posterior cerebral arteries are normal in course and caliber bilaterally.     Impression: Mild cervical and intracranial atherosclerotic changes are present as above, without evidence of associated high-grade stenosis or large vessel occlusion. 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 Electronically Signed: Barney Valle MD  4/30/2025 4:51 PM EDT  Workstation ID: [id number]    CT Head Without Contrast Stroke Protocol  Result Date: 4/30/2025  CT HEAD WO CONTRAST STROKE PROTOCOL Date of Exam: 4/30/2025 3:48 PM EDT Indication: Neuro deficit, acute, stroke suspected Neuro Deficit, acute, Stroke suspected. Right arm weakness Comparison: None available. Technique: Axial CT images were obtained of the head without contrast administration.  Reconstructed coronal images were also obtained. Automated exposure control and iterative construction methods were used. Scan Time: 1619 hours Results discussed with Viktoria at 1645 hours. Findings: The ventricles are mildly and diffusely prominent. Sulci and cerebellar folia are not abnormally prominent. Ill-defined diminished density in cerebral  white matter is consistent with mild gliosis and/or scattered old lacunar infarcts. No diminished density is seen in immediate periventricular white matter to suggest transependymal CSF. There is no CT evidence of acute intracranial hemorrhage, mass, or mass effect. No abnormality of the orbits is evident. The paranasal sinuses are well aerated. Mild mucosal thickening is seen in the left maxillary antrum. Nasal antral windows are widely patent. The middle ears are well aerated. Mastoidectomy defect on the left is well aerated.     Impression: CT scan of the head without IV contrast demonstrating mild prominence of the ventricles which may be due to atrophy or mild ventricular hypertension. Mild white matter changes consistent with gliosis and/or scattered old lacunar infarcts. No acute intracranial abnormality is seen. Electronically Signed: Bj Champagne MD  4/30/2025 4:48 PM EDT  Workstation ID: [id number]              Results for orders placed during the hospital encounter of 04/30/25    Adult Transthoracic Echo Complete W/ Cont if Necessary Per Protocol (With Agitated Saline)    Interpretation Summary    Left ventricular ejection fraction appears to be 66 - 70%.    Left ventricular wall thickness is consistent with borderline basal asymmetric hypertrophy.  Mild gradient in the LV cavity seen which at best is 16 mmHg    Left ventricular diastolic function is consistent with (grade I) impaired relaxation.    The left atrial cavity is mildly dilated.    Left atrial volume is mildly increased.    Saline test results are negative.    Estimated right ventricular systolic pressure from tricuspid regurgitation is normal (<35 mmHg).      Plan for Follow-up of Pending Labs/Results:     Discharge Details        Discharge Medications        New Medications        Instructions Start Date   atorvastatin 40 MG tablet  Commonly known as: LIPITOR   40 mg, Oral, Nightly      clopidogrel 75 MG tablet  Commonly known as: PLAVIX    75 mg, Oral, Daily      naloxone 4 MG/0.1ML nasal spray  Commonly known as: NARCAN   Call 911. Don't prime. [place] in 1 nostril for overdose. Repeat in 2-3 minutes in other nostril if no or minimal breathing/responsiveness.             Changes to Medications        Instructions Start Date   ALPRAZolam 0.5 MG tablet  Commonly known as: XANAX  What changed:   when to take this  reasons to take this   0.5 mg, Oral, 3 Times Daily PRN      ALPRAZolam 0.5 MG tablet  Commonly known as: XANAX  What changed: Another medication with the same name was changed. Make sure you understand how and when to take each.   0.5 mg, Oral, 3 times daily      amLODIPine 5 MG tablet  Commonly known as: NORVASC  What changed: additional instructions   5 mg, Daily             Continue These Medications        Instructions Start Date   albuterol sulfate  (90 Base) MCG/ACT inhaler  Commonly known as: PROVENTIL HFA;VENTOLIN HFA;PROAIR HFA   INHALE 2 PUFFS BY MOUTH EVERY 4 TO 6 HOURS AS NEEDED      allopurinol 100 MG tablet  Commonly known as: ZYLOPRIM   100 mg, 2 Times Daily      busPIRone 10 MG tablet  Commonly known as: BUSPAR   3 tablets, 2 Times Daily      cyclobenzaprine 10 MG tablet  Commonly known as: FLEXERIL   10 mg, Oral, 2 Times Daily PRN      gabapentin 400 MG capsule  Commonly known as: NEURONTIN   800 mg, Oral, 3 Times Daily      levothyroxine 137 MCG tablet  Commonly known as: SYNTHROID, LEVOTHROID   137 mcg, Oral, Every Early Morning      metFORMIN 500 MG tablet  Commonly known as: GLUCOPHAGE   500 mg, 2 Times Daily With Meals      oxyCODONE-acetaminophen  MG per tablet  Commonly known as: PERCOCET   1 tablet, Oral, Daily      rizatriptan 10 MG tablet  Commonly known as: MAXALT   10 mg, Oral, Once As Needed, May repeat in 2 hours if needed      rOPINIRole 1 MG tablet  Commonly known as: REQUIP   1 mg, 3 Times Daily             Stop These Medications      atenolol 25 MG tablet  Commonly known as: TENORMIN      diphenoxylate-atropine 2.5-0.025 MG per tablet  Commonly known as: LOMOTIL     erythromycin 5 MG/GM ophthalmic ointment  Commonly known as: ROMYCIN     Gemtesa 75 MG tablet  Generic drug: Vibegron     Myrbetriq 50 MG tablet sustained-release 24 hour 24 hr tablet  Generic drug: Mirabegron ER     promethazine 25 MG tablet  Commonly known as: PHENERGAN     simvastatin 20 MG tablet  Commonly known as: ZOCOR              Allergies   Allergen Reactions    Cymbalta [Duloxetine Hcl] Swelling     Blisters    Norco [Hydrocodone-Acetaminophen] Anaphylaxis    Amoxicillin-Pot Clavulanate Diarrhea     Pt gets a yeast infection.    Aspirin Hives    Ibuprofen Rash         Discharge Disposition:  Rehab Facility or Unit (DC - External)    Diet:  Hospital:  Diet Order   Procedures    Diet: Cardiac, Diabetic; Healthy Heart (2-3 Na+); Consistent Carbohydrate; Texture: Regular (IDDSI 7); Fluid Consistency: Thin (IDDSI 0)            Activity:      Restrictions or Other Recommendations:         CODE STATUS:    Code Status and Medical Interventions: CPR (Attempt to Resuscitate); Full Support   Ordered at: 04/30/25 2036     Code Status (Patient has no pulse and is not breathing):    CPR (Attempt to Resuscitate)     Medical Interventions (Patient has pulse or is breathing):    Full Support     Level Of Support Discussed With:    Patient       Future Appointments   Date Time Provider Department Center   6/4/2025  9:00 AM [facility]       Additional Instructions for the Follow-ups that You Need to Schedule       Ambulatory Referral to Neurology   As directed      4-6 weeks from discharge                  Araseli Polanco MD  05/09/25      Time Spent on Discharge:  I spent  40  minutes on this discharge activity which included: face-to-face encounter with the patient, reviewing the data in the system, coordination of the care with the nursing staff as well as consultants, documentation, and entering orders.     Significant med rec work, d/w pharmacy

## 2025-05-09 NOTE — CASE MANAGEMENT/SOCIAL WORK
Case Management Discharge Note      Final Note:     Ms. Call has an inpatient rehab bed today at [facility], if medically ready.  The initial rehab denial by the patient's [employer] insurance, was overturned after a P2P was completed this morning.  Confirmed approval and bed with Veronika from facility.      Updated Ms. Call, at the bedside, and she is agreeable to the DC plan.  Attempted to call Ms. Call's sister, Marilou, to update her, and no answer.  Left voice message with Marilou.      Attempted to arrange for a new PCP through the [facility], but the HUB stated that the rehab facility will need to arrange for the PCP after discharge from [facility].    [facility] ambulance is scheduled for transport today.  CM will contact EMS when the patient is ready to leave the building.  PCS form has been faxed to the ambulance drop box, and then placed in the chartlett.      Call report to [facility] Stroke Unit at  [phone].    CM will fax the DC summary to fax [phone].      Thank you.         Selected Continued Care - Admitted Since 4/30/2025       Destination Coordination complete.      Service Provider Services Address Phone Fax Patient Preferred    [facility] [address] [address] [phone] --                 Transportation Services  Ambulance: [facility]  W/C Van:  ([employer])    Final Discharge Disposition Code: 62 - inpatient rehab facility

## 2025-06-20 ENCOUNTER — DOCUMENTATION (OUTPATIENT)
Dept: FAMILY MEDICINE CLINIC | Facility: CLINIC | Age: 70
End: 2025-06-20
Payer: MEDICARE

## 2025-06-20 RX ORDER — PROMETHAZINE HYDROCHLORIDE 25 MG/1
25 TABLET ORAL EVERY 6 HOURS PRN
COMMUNITY

## 2025-06-20 RX ORDER — ESCITALOPRAM OXALATE 5 MG/1
5 TABLET ORAL DAILY
COMMUNITY

## 2025-06-20 RX ORDER — CLOPIDOGREL BISULFATE 75 MG/1
75 TABLET ORAL DAILY
COMMUNITY

## 2025-06-20 RX ORDER — GABAPENTIN 600 MG/1
600 TABLET ORAL 3 TIMES DAILY
COMMUNITY

## 2025-06-20 RX ORDER — LEVOFLOXACIN 500 MG/1
500 TABLET, FILM COATED ORAL DAILY
COMMUNITY

## 2025-06-20 RX ORDER — SIMVASTATIN 20 MG
20 TABLET ORAL NIGHTLY
COMMUNITY

## 2025-06-20 RX ORDER — OXYCODONE HYDROCHLORIDE 5 MG/1
5 TABLET ORAL EVERY 8 HOURS PRN
COMMUNITY

## 2025-06-20 RX ORDER — BACLOFEN 10 MG/1
10 TABLET ORAL 3 TIMES DAILY
COMMUNITY

## 2025-06-20 RX ORDER — PANTOPRAZOLE SODIUM 40 MG/1
40 TABLET, DELAYED RELEASE ORAL DAILY
COMMUNITY

## 2025-06-20 RX ORDER — INSULIN GLARGINE-YFGN 100 [IU]/ML
26 INJECTION, SOLUTION SUBCUTANEOUS 2 TIMES DAILY
COMMUNITY

## 2025-06-20 RX ORDER — DOCUSATE SODIUM 100 MG/1
100 CAPSULE, LIQUID FILLED ORAL 2 TIMES DAILY PRN
COMMUNITY

## 2025-06-20 RX ORDER — LACTULOSE 10 G/15ML
20 SOLUTION ORAL 2 TIMES DAILY
COMMUNITY

## 2025-06-20 RX ORDER — ATENOLOL 25 MG/1
12.5 TABLET ORAL 2 TIMES DAILY
COMMUNITY

## (undated) DEVICE — HYPODERMIC SAFETY NEEDLE: Brand: MONOJECT

## (undated) DEVICE — INTENDED USE FOR SURGICAL MARKING ON INTACT SKIN, ALSO PROVIDES A PERMANENT METHOD OF IDENTIFYING OBJECTS IN THE OPERATING ROOM: Brand: WRITESITE® REGULAR TIP SKIN MARKER

## (undated) DEVICE — GOWN SURG ORBIS LVL3 2XL STRL

## (undated) DEVICE — APPL CHLORAPREP TINTED 26ML TEAL

## (undated) DEVICE — SUT SILK 2/0 SH 30IN K833H

## (undated) DEVICE — ANTIBACTERIAL UNDYED BRAIDED (POLYGLACTIN 910), SYNTHETIC ABSORBABLE SUTURE: Brand: COATED VICRYL

## (undated) DEVICE — PENCL SMOKE/EVAC MEGADYNE TELESCP 10FT

## (undated) DEVICE — GLV SURG BIOGEL LTX PF 8

## (undated) DEVICE — ADHS SKIN PREMIERPRO EXOFIN TOPICAL HI/VISC .5ML

## (undated) DEVICE — SHEET,DRAPE,40X58,STERILE: Brand: MEDLINE

## (undated) DEVICE — SYR LL W/SCALE/MARK 3ML STRL

## (undated) DEVICE — 3M™ STERI-STRIP™ REINFORCED ADHESIVE SKIN CLOSURES, R1547, 1/2 IN X 4 IN (12 MM X 100 MM), 6 STRIPS/ENVELOPE: Brand: 3M™ STERI-STRIP™

## (undated) DEVICE — PROGRAMMER PUMP EXT FOR SYNCHROMEDII TH90T01

## (undated) DEVICE — CATHETER 8591-38 PASSER,38CM

## (undated) DEVICE — DRSNG SURESITE WNDW 4X4.5

## (undated) DEVICE — IRRIGATOR BULB ASEPTO 60CC STRL

## (undated) DEVICE — SYRINGE, LUER LOCK, 5ML: Brand: MEDLINE

## (undated) DEVICE — PK MINOR SPLT 10

## (undated) DEVICE — CONTAINER,SPECIMEN,OR STERILE,4OZ: Brand: MEDLINE

## (undated) DEVICE — STRAP POSTN KN/BDY FM 5X72IN DISP

## (undated) DEVICE — 450 ML BOTTLE OF 0.05% CHLORHEXIDINE GLUCONATE IN 99.95% STERILE WATER FOR IRRIGATION, USP AND APPLICATOR.: Brand: IRRISEPT ANTIMICROBIAL WOUND LAVAGE

## (undated) DEVICE — SYR LUERLOK 20CC BX/50

## (undated) DEVICE — 3M™ IOBAN™ 2 ANTIMICROBIAL INCISE DRAPE 6650EZ: Brand: IOBAN™ 2

## (undated) DEVICE — SNAP KOVER: Brand: UNBRANDED

## (undated) DEVICE — PCH INST SURG INVISISHIELD 2PCKT